# Patient Record
Sex: FEMALE | Race: BLACK OR AFRICAN AMERICAN | NOT HISPANIC OR LATINO | Employment: FULL TIME | URBAN - METROPOLITAN AREA
[De-identification: names, ages, dates, MRNs, and addresses within clinical notes are randomized per-mention and may not be internally consistent; named-entity substitution may affect disease eponyms.]

---

## 2021-01-26 LAB — CRC RECOMMENDATION EXT: NORMAL

## 2021-04-20 LAB
HPV RNA, HR E6/E7, TMA: NOT DETECTED
PAP RECOMMENDATION EXT: NORMAL

## 2022-07-20 LAB — BCS RECOMMENDATION EXT: NORMAL

## 2022-10-18 ENCOUNTER — OFFICE VISIT (OUTPATIENT)
Dept: PRIMARY CARE CLINIC | Facility: CLINIC | Age: 52
End: 2022-10-18
Payer: COMMERCIAL

## 2022-10-18 VITALS
DIASTOLIC BLOOD PRESSURE: 88 MMHG | HEART RATE: 84 BPM | RESPIRATION RATE: 18 BRPM | SYSTOLIC BLOOD PRESSURE: 122 MMHG | BODY MASS INDEX: 36.79 KG/M2 | TEMPERATURE: 99 F | WEIGHT: 242.75 LBS | HEIGHT: 68 IN | OXYGEN SATURATION: 100 %

## 2022-10-18 DIAGNOSIS — Z00.00 PREVENTATIVE HEALTH CARE: ICD-10-CM

## 2022-10-18 DIAGNOSIS — G89.29 CHRONIC BILATERAL LOW BACK PAIN WITHOUT SCIATICA: ICD-10-CM

## 2022-10-18 DIAGNOSIS — N94.6 DYSMENORRHEA: ICD-10-CM

## 2022-10-18 DIAGNOSIS — D21.9 FIBROIDS: ICD-10-CM

## 2022-10-18 DIAGNOSIS — M54.50 CHRONIC BILATERAL LOW BACK PAIN WITHOUT SCIATICA: ICD-10-CM

## 2022-10-18 DIAGNOSIS — E55.9 VITAMIN D DEFICIENCY: ICD-10-CM

## 2022-10-18 DIAGNOSIS — J32.9 CHRONIC RHINOSINUSITIS: Primary | ICD-10-CM

## 2022-10-18 DIAGNOSIS — J01.90 ACUTE BACTERIAL RHINOSINUSITIS: ICD-10-CM

## 2022-10-18 DIAGNOSIS — J34.2 NASAL SEPTAL DEVIATION: ICD-10-CM

## 2022-10-18 DIAGNOSIS — K11.8 MASS OF LEFT PAROTID GLAND: ICD-10-CM

## 2022-10-18 DIAGNOSIS — B96.89 ACUTE BACTERIAL RHINOSINUSITIS: ICD-10-CM

## 2022-10-18 DIAGNOSIS — B35.1 ONYCHOMYCOSIS OF GREAT TOE: ICD-10-CM

## 2022-10-18 PROCEDURE — 99999 PR PBB SHADOW E&M-NEW PATIENT-LVL V: ICD-10-PCS | Mod: PBBFAC,,, | Performed by: INTERNAL MEDICINE

## 2022-10-18 PROCEDURE — 3079F DIAST BP 80-89 MM HG: CPT | Mod: CPTII,S$GLB,, | Performed by: INTERNAL MEDICINE

## 2022-10-18 PROCEDURE — 99204 PR OFFICE/OUTPT VISIT, NEW, LEVL IV, 45-59 MIN: ICD-10-PCS | Mod: S$GLB,,, | Performed by: INTERNAL MEDICINE

## 2022-10-18 PROCEDURE — 1159F PR MEDICATION LIST DOCUMENTED IN MEDICAL RECORD: ICD-10-PCS | Mod: CPTII,S$GLB,, | Performed by: INTERNAL MEDICINE

## 2022-10-18 PROCEDURE — 3074F PR MOST RECENT SYSTOLIC BLOOD PRESSURE < 130 MM HG: ICD-10-PCS | Mod: CPTII,S$GLB,, | Performed by: INTERNAL MEDICINE

## 2022-10-18 PROCEDURE — 1159F MED LIST DOCD IN RCRD: CPT | Mod: CPTII,S$GLB,, | Performed by: INTERNAL MEDICINE

## 2022-10-18 PROCEDURE — 4010F ACE/ARB THERAPY RXD/TAKEN: CPT | Mod: CPTII,S$GLB,, | Performed by: INTERNAL MEDICINE

## 2022-10-18 PROCEDURE — 4010F PR ACE/ARB THEARPY RXD/TAKEN: ICD-10-PCS | Mod: CPTII,S$GLB,, | Performed by: INTERNAL MEDICINE

## 2022-10-18 PROCEDURE — 99999 PR PBB SHADOW E&M-NEW PATIENT-LVL V: CPT | Mod: PBBFAC,,, | Performed by: INTERNAL MEDICINE

## 2022-10-18 PROCEDURE — 99204 OFFICE O/P NEW MOD 45 MIN: CPT | Mod: S$GLB,,, | Performed by: INTERNAL MEDICINE

## 2022-10-18 PROCEDURE — 3074F SYST BP LT 130 MM HG: CPT | Mod: CPTII,S$GLB,, | Performed by: INTERNAL MEDICINE

## 2022-10-18 PROCEDURE — 3079F PR MOST RECENT DIASTOLIC BLOOD PRESSURE 80-89 MM HG: ICD-10-PCS | Mod: CPTII,S$GLB,, | Performed by: INTERNAL MEDICINE

## 2022-10-18 RX ORDER — MONTELUKAST SODIUM 10 MG/1
10 TABLET ORAL NIGHTLY
Qty: 30 TABLET | Refills: 0 | Status: SHIPPED | OUTPATIENT
Start: 2022-10-18 | End: 2022-11-14

## 2022-10-18 RX ORDER — LORATADINE 10 MG/1
TABLET ORAL
COMMUNITY
Start: 2022-10-01 | End: 2022-10-18 | Stop reason: SDUPTHER

## 2022-10-18 RX ORDER — ERGOCALCIFEROL 1.25 MG/1
50000 CAPSULE ORAL
Qty: 12 CAPSULE | Refills: 3 | Status: SHIPPED | OUTPATIENT
Start: 2022-10-18 | End: 2023-11-06 | Stop reason: SDUPTHER

## 2022-10-18 RX ORDER — NAPROXEN 500 MG/1
500 TABLET ORAL 2 TIMES DAILY WITH MEALS
Qty: 60 TABLET | Refills: 5 | Status: SHIPPED | OUTPATIENT
Start: 2022-10-18 | End: 2023-11-06

## 2022-10-18 RX ORDER — CYCLOBENZAPRINE HCL 10 MG
TABLET ORAL
COMMUNITY
Start: 2022-10-01 | End: 2022-10-18 | Stop reason: SDUPTHER

## 2022-10-18 RX ORDER — PANTOPRAZOLE SODIUM 20 MG/1
20 TABLET, DELAYED RELEASE ORAL DAILY
COMMUNITY
End: 2022-10-18 | Stop reason: SDUPTHER

## 2022-10-18 RX ORDER — NAPROXEN 500 MG/1
TABLET ORAL
COMMUNITY
Start: 2022-08-10 | End: 2022-10-18 | Stop reason: SDUPTHER

## 2022-10-18 RX ORDER — LORATADINE 10 MG/1
10 TABLET ORAL DAILY
Qty: 90 TABLET | Refills: 3 | Status: SHIPPED | OUTPATIENT
Start: 2022-10-18 | End: 2023-11-29

## 2022-10-18 RX ORDER — ERGOCALCIFEROL 1.25 MG/1
50000 CAPSULE ORAL
COMMUNITY
Start: 2022-09-29 | End: 2022-10-18 | Stop reason: SDUPTHER

## 2022-10-18 RX ORDER — PANTOPRAZOLE SODIUM 40 MG/10ML
INJECTION, POWDER, LYOPHILIZED, FOR SOLUTION INTRAVENOUS
COMMUNITY
Start: 2022-10-01 | End: 2022-10-18

## 2022-10-18 RX ORDER — CYCLOBENZAPRINE HCL 10 MG
5 TABLET ORAL NIGHTLY
Qty: 90 TABLET | Refills: 3 | Status: SHIPPED | OUTPATIENT
Start: 2022-10-18

## 2022-10-18 RX ORDER — PANTOPRAZOLE SODIUM 20 MG/1
20 TABLET, DELAYED RELEASE ORAL
Qty: 90 TABLET | Refills: 3 | Status: SHIPPED | OUTPATIENT
Start: 2022-10-18 | End: 2023-06-03

## 2022-10-18 RX ORDER — AZITHROMYCIN 250 MG/1
TABLET, FILM COATED ORAL
Qty: 6 TABLET | Refills: 0 | Status: SHIPPED | OUTPATIENT
Start: 2022-10-18 | End: 2022-10-23

## 2022-10-18 NOTE — PATIENT INSTRUCTIONS
Get COVID vaccine  -Check Bps at home weekly and prn symptoms for goal BP <130/80.  -cont healthy diet high in fiber, low fat dairy, lean protein, low in saturated/trans fat; high in Ca/Mag/K, 1.5 gm  Na diet; low in processed sugars and foods, ie no white sugars, bread, pasta, rice.   -Moderate exercise 30 min 5 times per week or 75 min intense exercise  -reduce alcohol to 1-2 glasses per day  - no NSAIDs

## 2022-10-18 NOTE — ASSESSMENT & PLAN NOTE
-refer to podiatry, Dr Urbano Matos, for eval and tx  -stop using OTC Kerasal which is not effective for tx'g fungal infection

## 2022-10-18 NOTE — ASSESSMENT & PLAN NOTE
Refill Naproxen to be taken with PPI; refill flexeril 5mg po prn severe back/pelvic pain associated with menstruation

## 2022-10-18 NOTE — ASSESSMENT & PLAN NOTE
-start Singulair 10mg po daily; cont Claritin, nasal saline then flonase bid  -refer to ENT, Dr. Mary, for eval and tx

## 2022-10-18 NOTE — PROGRESS NOTES
[facility] Note      Chief Complaint     Annual Exam (ES), Establish Care, and Sinus Problem (Going on for 2 weeks)   for annual exam and acute sinus infeciton    Subjective:      History of Present Illness:  Serena Yang is a 52 y.o. female     C/o 2 week hx of progressive sinus congestion and post-nasal drip, yellow nasal discharge, halitosis, right upper teeth pain, right facial swelling with occasional chills without fever. Pt recently saw a dentist who stated pt did not have dental issues. She uses saline then flonase.     Dysmenorrhea with Enlarged leiomyomatous uterus with multiple fibroids by transvag US 10/2021: still with heavy bleeding x 4 days with clots; pt c/o severe back pain requiring flexeril, naproxen and PPI - requests refill; pt sees Dr. Kendall and will consider FIDE since conservative measures have not been effective.     Chronic bilateral ethmoid and maxillary sinusitis and nasal septal deviation to the right; mucus retention cyst in right sphenoid sinus based on CT of face with contrast done 10/2021: on saline and flonase; now with acute episode x 2 weeks;  also takes Claritin 10mg daily since Zyrtec was too sedatin    Posterior mass 1 cm 1 cm 1.2 cm of left parotid gland incidentally found on CT of head on 10/2021: recommended CT of neck with and without contrast but has not yet been ordered    Sclerotic right big toenail and right 5th toenail: failed OTC Kerasal and requests referral to podiatry     /78: yesterday    Obesity: gained approx 10lbs since working from 8am - 5pm at home; ordered a treadmill.     Abnormal MMG requiring diagnostic right MMG 7/2022: repeat in 1/2023 (Within the upper inner quadrant of the posterior right breast approximately 10 cm from the nipple is a tight group of round/punctate microcalcifications that have a benign appearance. Short-term interval follow-up in 6 months is recommended with a repeat mammogram to ensure  stability)    Vit D def'y: cont to take Vit D 50,000 units weekly    SH: lives in [facility] with samantha; has 34 y/o son who lives in [place]    Past Medical History:  No past medical history on file.    Past Surgical History:  No past surgical history on file.    Allergies:  Review of patient's allergies indicates:  No Known Allergies    Home Medications:  Current Outpatient Medications   Medication Sig Dispense Refill    fluticasone propionate (FLONASE NASL)       azithromycin (Z-MARLO) 250 MG tablet Take 2 tablets by mouth on day 1; Take 1 tablet by mouth on days 2-5 6 tablet 0    cyclobenzaprine (FLEXERIL) 10 MG tablet Take 0.5 tablets (5 mg total) by mouth every evening. 90 tablet 3    ergocalciferol (ERGOCALCIFEROL) 50,000 unit Cap Take 1 capsule (50,000 Units total) by mouth every 7 days. 12 capsule 3    loratadine (CLARITIN) 10 mg tablet Take 1 tablet (10 mg total) by mouth once daily. 90 tablet 3    montelukast (SINGULAIR) 10 mg tablet Take 1 tablet (10 mg total) by mouth every evening. 30 tablet 0    naproxen (NAPROSYN) 500 MG tablet Take 1 tablet (500 mg total) by mouth 2 (two) times daily with meals. 60 tablet 5    pantoprazole (PROTONIX) 20 MG tablet Take 1 tablet (20 mg total) by mouth 2 (two) times daily before meals. 90 tablet 3     No current facility-administered medications for this visit.        Family History:  No family history on file.    Social History:  Social History     Tobacco Use    Smoking status: Never    Smokeless tobacco: Never       Review of Systems:  Pertinent positives and negatives listed in HPI. All other systems are reviewed and are negative.    Health Maintaince :   Health Maintenance Topics with due status: Not Due       Topic Last Completion Date    Mammogram 07/20/2022           Eye: UTD  Dental: has dental appt on 10/19/2022  Immunizations:   Tdap: not today.  Influenza: does not want.  Pneumovax: N/A  Shingrex x 2: No  COVID: needs bivalent booster  Hepatitis C:   Cancer  "Screening:  PAP: UTD with Dr. Kendall  Mammogram: UTD July 2022 with right diagnostic tomosynthesis MMG benign punctate calcifications - repeat in 6 mo 1/2023 at [facility]  Colonoscopy: 2022 WNL repeat in 2032 at  with Dr. Colbert  DEXA:  N/a    The ASCVD Risk score (Joe AGUILAR, et al., 2019) failed to calculate for the following reasons:    Cannot find a previous HDL lab    Cannot find a previous total cholesterol lab      Objective:   /88   Pulse 84   Temp 98.9 °F (37.2 °C) (Oral)   Resp 18   Ht 5' 8" (1.727 m)   Wt 110.1 kg (242 lb 11.6 oz)   LMP 10/13/2022   SpO2 100%   BMI 36.91 kg/m²      Body mass index is 36.91 kg/m².       Physical Examination:  General: Alert and awake in no apparent distress  HEENT: Normocephalic and atraumatic; Tms WNL; left nasal congestion; bilateral pale swollen inferior turbinates; tender maxillary sinuses right >left; mild cheek swelling  Eyes:  PERRL; EOMi with anicteric sclera and clear conjunctivae  Mouth:  Oropharynx clear and without exudate; moist mucous membranes  Neck:   Cervical nodes not enlarged; supple; no bruits  Cardio:  Regular rate and rhythm with normal S1 and S2; no murmurs or rubs  Resp:  CTAB; respirations unlabored; no wheezes, crackles or rhonchi  Abdom: Soft, NTND with normoactive bowel sounds; negative HSM  Extrem: Warm and well-perfused with no clubbing, cyanosis or edema;brown thickened sclerotic right big toenail and right 5th toenail; whitish chalk discoloration of left big toenail   Skin:  No rashes, lesions, or color changes  Pulses:  2+ and symmetric distally  Neuro:  AAOx3; cooperative and pleasant with no focal deficits    Laboratory:      Most Recent Data:  No results found for: WBC, HGB, HCT, PLT, CHOL, TRIG, HDL, LDLDIRECT, ALT, AST, NA, K, CL, BUN, CO2, TSH, PSA, INR, GLUF, HGBA1C, MICROALBUR           CBC: No results found for: WBC, HGB, HCT, PLT, MCV, RDW  BMP: No results found for: NA, K, CL, CO2, BUN, CREATININE, GLU, CALCIUM, MG, " PHOS  LFTs: No results found for: PROT, ALBUMIN, BILITOT, AST, ALKPHOS, ALT, GGT  Coags: No results found for: INR, PROTIME, PTT  FLP:    No results found for: CHOL  No results found for: HDL  No results found for: LDLCALC  No results found for: TRIG  No results found for: CHOLHDL   DM:    No results found for: HGBA1C, GLUF, MICROALBUR, LDLCALC, CREATININE  Thyroid: No results found for: TSH, FREET4, H7HUBEL, B3OLJHB, THYROIDAB  Anemia: No results found for: IRON, TIBC, FERRITIN, [id number], FOLATE  Cardiac: No results found for: TROPONINI, CKTOTAL, CKMB, BNP  Urinalysis: No results found for: LABURIN, COLORU, PHUA, CLARITYU, SPECGRAV, LABSPEC, NITRITE, PROTEINUR, GLUCOSEU, KETONESU, UROBILINOGEN, BILIRUBINUR, BLOODU, RBCU, WBCUA    Other Laboratory Data:      Other Results:  EKG (my interpretation):     Radiology:  No image results found.          Assessment/Plan     Serena Yang is a 52 y.o. female with:  1. Chronic rhinosinusitis  Assessment & Plan:  -start Singulair 10mg po daily; cont Claritin, nasal saline then flonase bid  -refer to ENT, Dr. Mary, for eval and tx      Orders:  -     montelukast (SINGULAIR) 10 mg tablet; Take 1 tablet (10 mg total) by mouth every evening.  Dispense: 30 tablet; Refill: 0  -     loratadine (CLARITIN) 10 mg tablet; Take 1 tablet (10 mg total) by mouth once daily.  Dispense: 90 tablet; Refill: 3  -     Ambulatory referral/consult to ENT; Future; Expected date: 10/25/2022    2. Mass of left parotid gland  -     CT Soft Tissue Neck With Contrast; Future; Expected date: 10/18/2022  -     Cancel: Ambulatory referral/consult to Endocrine/ENT Surgery for ENT/Head and Neck Surgeons; Future; Expected date: 10/25/2022    3. Acute bacterial rhinosinusitis  Assessment & Plan:  -Zpack prescribed today  -rinse sinuses with Ocean nasal spray then use flonase  -start Singulair 10mg po qhs      Orders:  -     azithromycin (Z-MARLO) 250 MG tablet; Take 2 tablets by mouth on day 1; Take 1  tablet by mouth on days 2-5  Dispense: 6 tablet; Refill: 0    4. Nasal septal deviation  Assessment & Plan:  -refer to ENT for eval and tx     Orders:  -     Ambulatory referral/consult to ENT; Future; Expected date: 10/25/2022    5. Preventative health care  Assessment & Plan:  Pt refuses flu and Shingrex  -get bivalent COVID vaccine  -check routine labs     Orders:  -     Lipid Panel; Future; Expected date: 10/18/2022  -     Hemoglobin A1C; Future; Expected date: 10/18/2022  -     Urinalysis; Future; Expected date: 10/18/2022  -     Comprehensive Metabolic Panel; Future; Expected date: 10/18/2022  -     CBC Auto Differential; Future; Expected date: 10/18/2022  -     Hepatitis C Antibody; Future; Expected date: 10/18/2022    6. Chronic bilateral low back pain without sciatica    7. Vitamin D deficiency  Assessment & Plan:  Check Vit D level now    Orders:  -     ergocalciferol (ERGOCALCIFEROL) 50,000 unit Cap; Take 1 capsule (50,000 Units total) by mouth every 7 days.  Dispense: 12 capsule; Refill: 3  -     Vitamin D; Future; Expected date: 10/18/2022    8. Onychomycosis of great toe  Assessment & Plan:  -refer to podiatry, Dr Urbano Matos, for eval and tx  -stop using OTC Kerasal which is not effective for tx'g fungal infection    Orders:  -     Ambulatory referral/consult to Podiatry; Future; Expected date: 10/25/2022    9. Dysmenorrhea  Assessment & Plan:  Refill Naproxen to be taken with PPI; refill flexeril 5mg po prn severe back/pelvic pain associated with menstruation    Orders:  -     cyclobenzaprine (FLEXERIL) 10 MG tablet; Take 0.5 tablets (5 mg total) by mouth every evening.  Dispense: 90 tablet; Refill: 3  -     naproxen (NAPROSYN) 500 MG tablet; Take 1 tablet (500 mg total) by mouth 2 (two) times daily with meals.  Dispense: 60 tablet; Refill: 5  -     pantoprazole (PROTONIX) 20 MG tablet; Take 1 tablet (20 mg total) by mouth 2 (two) times daily before meals.  Dispense: 90 tablet; Refill: 3    10.  Fibroids  Assessment & Plan:  Pt considering surgery ie myomectomy vs hysterectomy   -check CBC now             I spent a total of 45 minutes on the day of the visit.This includes face to face time and non-face to face time preparing to see the patient (eg, review of tests), obtaining and/or reviewing separately obtained history, documenting clinical information in the electronic or other health record, independently interpreting results and communicating results to the patient/family/caregiver, or care coordinator.   Code Status:     Janelle Zamora MD

## 2022-10-18 NOTE — ASSESSMENT & PLAN NOTE
-Zpack prescribed today  -rinse sinuses with Ocean nasal spray then use flonase  -start Singulair 10mg po qhs

## 2022-10-27 ENCOUNTER — LAB VISIT (OUTPATIENT)
Dept: LAB | Facility: HOSPITAL | Age: 52
End: 2022-10-27
Attending: INTERNAL MEDICINE
Payer: COMMERCIAL

## 2022-10-27 DIAGNOSIS — E55.9 VITAMIN D DEFICIENCY: ICD-10-CM

## 2022-10-27 DIAGNOSIS — Z00.00 PREVENTATIVE HEALTH CARE: ICD-10-CM

## 2022-10-27 LAB
25(OH)D3+25(OH)D2 SERPL-MCNC: 19 NG/ML (ref 30–96)
ALBUMIN SERPL BCP-MCNC: 3.7 G/DL (ref 3.5–5.2)
ALP SERPL-CCNC: 61 U/L (ref 55–135)
ALT SERPL W/O P-5'-P-CCNC: 14 U/L (ref 10–44)
ANION GAP SERPL CALC-SCNC: 11 MMOL/L (ref 8–16)
AST SERPL-CCNC: 14 U/L (ref 10–40)
BASOPHILS # BLD AUTO: 0.01 K/UL (ref 0–0.2)
BASOPHILS NFR BLD: 0.3 % (ref 0–1.9)
BILIRUB SERPL-MCNC: 0.6 MG/DL (ref 0.1–1)
BUN SERPL-MCNC: 9 MG/DL (ref 6–20)
CALCIUM SERPL-MCNC: 9 MG/DL (ref 8.7–10.5)
CHLORIDE SERPL-SCNC: 101 MMOL/L (ref 95–110)
CHOLEST SERPL-MCNC: 192 MG/DL (ref 120–199)
CHOLEST/HDLC SERPL: 3.2 {RATIO} (ref 2–5)
CO2 SERPL-SCNC: 23 MMOL/L (ref 23–29)
CREAT SERPL-MCNC: 0.7 MG/DL (ref 0.5–1.4)
DIFFERENTIAL METHOD: ABNORMAL
EOSINOPHIL # BLD AUTO: 0.1 K/UL (ref 0–0.5)
EOSINOPHIL NFR BLD: 1.7 % (ref 0–8)
ERYTHROCYTE [DISTWIDTH] IN BLOOD BY AUTOMATED COUNT: 13.7 % (ref 11.5–14.5)
EST. GFR  (NO RACE VARIABLE): >60 ML/MIN/1.73 M^2
ESTIMATED AVG GLUCOSE: 117 MG/DL (ref 68–131)
GLUCOSE SERPL-MCNC: 81 MG/DL (ref 70–110)
HBA1C MFR BLD: 5.7 % (ref 4–5.6)
HCT VFR BLD AUTO: 39.8 % (ref 37–48.5)
HCV AB SERPL QL IA: NORMAL
HDLC SERPL-MCNC: 60 MG/DL (ref 40–75)
HDLC SERPL: 31.3 % (ref 20–50)
HGB BLD-MCNC: 12.5 G/DL (ref 12–16)
IMM GRANULOCYTES # BLD AUTO: 0 K/UL (ref 0–0.04)
IMM GRANULOCYTES NFR BLD AUTO: 0 % (ref 0–0.5)
LDLC SERPL CALC-MCNC: 119 MG/DL (ref 63–159)
LYMPHOCYTES # BLD AUTO: 2.2 K/UL (ref 1–4.8)
LYMPHOCYTES NFR BLD: 61.1 % (ref 18–48)
MCH RBC QN AUTO: 27.6 PG (ref 27–31)
MCHC RBC AUTO-ENTMCNC: 31.4 G/DL (ref 32–36)
MCV RBC AUTO: 88 FL (ref 82–98)
MONOCYTES # BLD AUTO: 0.3 K/UL (ref 0.3–1)
MONOCYTES NFR BLD: 9.2 % (ref 4–15)
NEUTROPHILS # BLD AUTO: 1 K/UL (ref 1.8–7.7)
NEUTROPHILS NFR BLD: 27.7 % (ref 38–73)
NONHDLC SERPL-MCNC: 132 MG/DL
NRBC BLD-RTO: 0 /100 WBC
PLATELET # BLD AUTO: 310 K/UL (ref 150–450)
PMV BLD AUTO: 11.2 FL (ref 9.2–12.9)
POTASSIUM SERPL-SCNC: 3.8 MMOL/L (ref 3.5–5.1)
PROT SERPL-MCNC: 8.4 G/DL (ref 6–8.4)
RBC # BLD AUTO: 4.53 M/UL (ref 4–5.4)
SODIUM SERPL-SCNC: 135 MMOL/L (ref 136–145)
TRIGL SERPL-MCNC: 65 MG/DL (ref 30–150)
WBC # BLD AUTO: 3.6 K/UL (ref 3.9–12.7)

## 2022-10-27 PROCEDURE — 80061 LIPID PANEL: CPT | Performed by: INTERNAL MEDICINE

## 2022-10-27 PROCEDURE — 83036 HEMOGLOBIN GLYCOSYLATED A1C: CPT | Performed by: INTERNAL MEDICINE

## 2022-10-27 PROCEDURE — 82306 VITAMIN D 25 HYDROXY: CPT | Performed by: INTERNAL MEDICINE

## 2022-10-27 PROCEDURE — 36415 COLL VENOUS BLD VENIPUNCTURE: CPT | Mod: PN | Performed by: INTERNAL MEDICINE

## 2022-10-27 PROCEDURE — 80053 COMPREHEN METABOLIC PANEL: CPT | Performed by: INTERNAL MEDICINE

## 2022-10-27 PROCEDURE — 85025 COMPLETE CBC W/AUTO DIFF WBC: CPT | Performed by: INTERNAL MEDICINE

## 2022-10-27 PROCEDURE — 86803 HEPATITIS C AB TEST: CPT | Performed by: INTERNAL MEDICINE

## 2022-10-28 ENCOUNTER — PATIENT MESSAGE (OUTPATIENT)
Dept: PRIMARY CARE CLINIC | Facility: CLINIC | Age: 52
End: 2022-10-28
Payer: COMMERCIAL

## 2022-10-28 DIAGNOSIS — J32.9 CHRONIC RHINOSINUSITIS: Primary | ICD-10-CM

## 2022-11-01 ENCOUNTER — OFFICE VISIT (OUTPATIENT)
Dept: PODIATRY | Facility: CLINIC | Age: 52
End: 2022-11-01
Payer: COMMERCIAL

## 2022-11-01 ENCOUNTER — HOSPITAL ENCOUNTER (OUTPATIENT)
Dept: RADIOLOGY | Facility: OTHER | Age: 52
Discharge: HOME | End: 2022-11-01
Attending: INTERNAL MEDICINE
Payer: COMMERCIAL

## 2022-11-01 VITALS
DIASTOLIC BLOOD PRESSURE: 85 MMHG | BODY MASS INDEX: 36.68 KG/M2 | SYSTOLIC BLOOD PRESSURE: 142 MMHG | HEART RATE: 98 BPM | WEIGHT: 242 LBS | HEIGHT: 68 IN

## 2022-11-01 DIAGNOSIS — B35.1 ONYCHOMYCOSIS OF GREAT TOE: ICD-10-CM

## 2022-11-01 DIAGNOSIS — B35.1 ONYCHOMYCOSIS DUE TO DERMATOPHYTE: Primary | ICD-10-CM

## 2022-11-01 DIAGNOSIS — K11.8 MASS OF LEFT PAROTID GLAND: ICD-10-CM

## 2022-11-01 PROCEDURE — 70491 CT SOFT TISSUE NECK WITH CONTRAST: ICD-10-PCS | Mod: 26,,, | Performed by: RADIOLOGY

## 2022-11-01 PROCEDURE — 99999 PR PBB SHADOW E&M-EST. PATIENT-LVL III: ICD-10-PCS | Mod: PBBFAC,,, | Performed by: PODIATRIST

## 2022-11-01 PROCEDURE — 99202 OFFICE O/P NEW SF 15 MIN: CPT | Mod: S$GLB,,, | Performed by: PODIATRIST

## 2022-11-01 PROCEDURE — 99999 PR PBB SHADOW E&M-EST. PATIENT-LVL III: CPT | Mod: PBBFAC,,, | Performed by: PODIATRIST

## 2022-11-01 PROCEDURE — 70491 CT SOFT TISSUE NECK W/DYE: CPT | Mod: 26,,, | Performed by: RADIOLOGY

## 2022-11-01 PROCEDURE — 99202 PR OFFICE/OUTPT VISIT, NEW, LEVL II, 15-29 MIN: ICD-10-PCS | Mod: S$GLB,,, | Performed by: PODIATRIST

## 2022-11-01 PROCEDURE — 25500020 PHARM REV CODE 255: Performed by: INTERNAL MEDICINE

## 2022-11-01 PROCEDURE — 70491 CT SOFT TISSUE NECK W/DYE: CPT | Mod: TC

## 2022-11-01 RX ORDER — CICLOPIROX 80 MG/ML
SOLUTION TOPICAL NIGHTLY
Qty: 6.6 ML | Refills: 11 | Status: SHIPPED | OUTPATIENT
Start: 2022-11-01

## 2022-11-01 RX ADMIN — IOHEXOL 75 ML: 350 INJECTION, SOLUTION INTRAVENOUS at 02:11

## 2022-11-01 NOTE — PROGRESS NOTES
Subjective:      Patient ID: Serena Yang is a 52 y.o. female.    Chief Complaint: Nail Problem (Onychomycosis of R great toe)    Thick dark discolored misshapen toenails right 1st and 5th toes.  Gradual onset, worsening over past several weeks, aggravated by increased weight bearing, shoe gear, pressure.  No previous medical treatment.  OTC  med not helping.     Review of Systems   Constitutional: Negative for chills, decreased appetite, diaphoresis, fever and malaise/fatigue.   Skin:  Positive for nail changes.         Objective:      Physical Exam  Constitutional:       General: She is not in acute distress.     Appearance: She is well-developed. She is not diaphoretic.   Cardiovascular:      Pulses:           Popliteal pulses are 2+ on the right side and 2+ on the left side.        Dorsalis pedis pulses are 2+ on the right side and 2+ on the left side.        Posterior tibial pulses are 2+ on the right side and 2+ on the left side.      Comments: Capillary refill 3 seconds all toes/distal feet, all toes/both feet warm to touch.      Negative lymphadenopathy bilateral popliteal fossa and tarsal tunnel.      Negavie lower extremity edema bilateral.    Musculoskeletal:      Right ankle: No swelling, deformity, ecchymosis or lacerations. Normal range of motion. Normal pulse.      Right Achilles Tendon: Normal. No defects. Sanon's test negative.   Lymphadenopathy:      Lower Body: No right inguinal adenopathy. No left inguinal adenopathy.      Comments: Negative lymphadenopathy bilateral popliteal fossa and tarsal tunnel.    Negative lymphangitic streaking bilateral feet/ankles/legs.   Skin:     General: Skin is warm and dry.      Capillary Refill: Capillary refill takes 2 to 3 seconds.      Coloration: Skin is not pale.      Findings: No abrasion, bruising, burn, ecchymosis, erythema, laceration, lesion or rash.      Nails: There is no clubbing.      Comments: Toenails 1st, 5th  right are hypertrophic,  dystrophic, discolored tanish brown with tan, gray crumbly subungual debris.  Tender to distal nail plate pressure, without periungual skin abnormality of each.       Neurological:      Mental Status: She is alert and oriented to person, place, and time.      Sensory: No sensory deficit.      Motor: No tremor, atrophy or abnormal muscle tone.      Gait: Gait normal.      Deep Tendon Reflexes:      Reflex Scores:       Patellar reflexes are 2+ on the right side and 2+ on the left side.       Achilles reflexes are 2+ on the right side and 2+ on the left side.  Psychiatric:         Behavior: Behavior is cooperative.           Assessment:       Encounter Diagnoses   Name Primary?    Onychomycosis of great toe     Onychomycosis due to dermatophyte Yes         Plan:       Serena was seen today for nail problem.    Diagnoses and all orders for this visit:    Onychomycosis due to dermatophyte    Onychomycosis of great toe  -     Ambulatory referral/consult to Podiatry    Other orders  -     ciclopirox (PENLAC) 8 % Soln; Apply topically nightly.      I counseled the patient on her conditions, their implications and medical management.        Penlac/dry well after bathing/Discussed conservative treatment with shoes of adequate dimensions, material, and style to alleviate symptoms and delay or prevent surgical intervention.           No follow-ups on file.

## 2022-11-02 ENCOUNTER — PATIENT MESSAGE (OUTPATIENT)
Dept: PRIMARY CARE CLINIC | Facility: CLINIC | Age: 52
End: 2022-11-02
Payer: COMMERCIAL

## 2022-11-02 DIAGNOSIS — E04.1 RIGHT THYROID NODULE: Primary | ICD-10-CM

## 2022-11-05 RX ORDER — FLUTICASONE PROPIONATE 50 MCG
2 SPRAY, SUSPENSION (ML) NASAL DAILY
Qty: 11.1 ML | Refills: 3 | Status: SHIPPED | OUTPATIENT
Start: 2022-11-05 | End: 2023-04-29

## 2022-11-07 ENCOUNTER — PATIENT MESSAGE (OUTPATIENT)
Dept: ADMINISTRATIVE | Facility: HOSPITAL | Age: 52
End: 2022-11-07
Payer: COMMERCIAL

## 2022-11-11 ENCOUNTER — HOSPITAL ENCOUNTER (OUTPATIENT)
Dept: RADIOLOGY | Facility: OTHER | Age: 52
Discharge: HOME | End: 2022-11-11
Attending: INTERNAL MEDICINE
Payer: COMMERCIAL

## 2022-11-11 ENCOUNTER — TELEPHONE (OUTPATIENT)
Dept: PRIMARY CARE CLINIC | Facility: CLINIC | Age: 52
End: 2022-11-11
Payer: COMMERCIAL

## 2022-11-11 DIAGNOSIS — E04.1 RIGHT THYROID NODULE: ICD-10-CM

## 2022-11-11 PROCEDURE — 76536 US EXAM OF HEAD AND NECK: CPT | Mod: TC

## 2022-11-11 PROCEDURE — 76536 US SOFT TISSUE HEAD NECK THYROID: ICD-10-PCS | Mod: 26,,, | Performed by: RADIOLOGY

## 2022-11-11 PROCEDURE — 76536 US EXAM OF HEAD AND NECK: CPT | Mod: 26,,, | Performed by: RADIOLOGY

## 2022-11-13 DIAGNOSIS — E04.1 RIGHT THYROID NODULE: Primary | ICD-10-CM

## 2022-11-13 DIAGNOSIS — Z12.11 SCREENING FOR COLON CANCER: ICD-10-CM

## 2022-11-18 ENCOUNTER — PATIENT MESSAGE (OUTPATIENT)
Dept: PRIMARY CARE CLINIC | Facility: CLINIC | Age: 52
End: 2022-11-18
Payer: COMMERCIAL

## 2022-11-22 ENCOUNTER — TELEPHONE (OUTPATIENT)
Dept: PRIMARY CARE CLINIC | Facility: CLINIC | Age: 52
End: 2022-11-22
Payer: COMMERCIAL

## 2022-11-22 ENCOUNTER — PATIENT MESSAGE (OUTPATIENT)
Dept: PRIMARY CARE CLINIC | Facility: CLINIC | Age: 52
End: 2022-11-22
Payer: COMMERCIAL

## 2022-12-05 ENCOUNTER — PATIENT OUTREACH (OUTPATIENT)
Dept: ADMINISTRATIVE | Facility: HOSPITAL | Age: 52
End: 2022-12-05
Payer: COMMERCIAL

## 2022-12-05 NOTE — LETTER
AUTHORIZATION FOR RELEASE OF   CONFIDENTIAL INFORMATION    Dear Medical Records,    We are seeing Serena Yang, date of birth 1970, in the clinic at [facility] PRIMARY [facility]. Janelle Zamora MD is the patient's PCP. Serena Yang has an outstanding lab/procedure at the time we reviewed her chart. In order to help keep her health information updated, she has authorized us to request the following medical record(s):        (  )  MAMMOGRAM                                      ( x )  COLONOSCOPY      (  )  PAP SMEAR                                          (  )  OUTSIDE LAB RESULTS     (  )  DEXA SCAN                                          (  )  EYE EXAM            (  )  FOOT EXAM                                          (  )  ENTIRE RECORD     (  )  OUTSIDE IMMUNIZATIONS                 (  )  _______________         Please fax records to [facility], Betty Peyti Lo, MD, [phone]     If you have any questions, please contact PATRIZIA Lowe at [phone].           Patient Name: Serena Yang  : 1970  Patient Phone #: [phone]

## 2022-12-12 ENCOUNTER — PATIENT OUTREACH (OUTPATIENT)
Dept: ADMINISTRATIVE | Facility: HOSPITAL | Age: 52
End: 2022-12-12
Payer: COMMERCIAL

## 2022-12-14 ENCOUNTER — PATIENT OUTREACH (OUTPATIENT)
Dept: ADMINISTRATIVE | Facility: HOSPITAL | Age: 52
End: 2022-12-14
Payer: COMMERCIAL

## 2023-01-03 ENCOUNTER — PATIENT MESSAGE (OUTPATIENT)
Dept: PRIMARY CARE CLINIC | Facility: CLINIC | Age: 53
End: 2023-01-03
Payer: COMMERCIAL

## 2023-01-03 DIAGNOSIS — Z12.39 ENCOUNTER FOR SCREENING FOR MALIGNANT NEOPLASM OF BREAST, UNSPECIFIED SCREENING MODALITY: Primary | ICD-10-CM

## 2023-01-23 PROBLEM — J01.90 ACUTE BACTERIAL RHINOSINUSITIS: Status: RESOLVED | Noted: 2022-10-18 | Resolved: 2023-01-23

## 2023-01-23 PROBLEM — Z00.00 PREVENTATIVE HEALTH CARE: Status: RESOLVED | Noted: 2022-10-18 | Resolved: 2023-01-23

## 2023-01-23 PROBLEM — B96.89 ACUTE BACTERIAL RHINOSINUSITIS: Status: RESOLVED | Noted: 2022-10-18 | Resolved: 2023-01-23

## 2023-04-13 ENCOUNTER — PATIENT MESSAGE (OUTPATIENT)
Dept: PRIMARY CARE CLINIC | Facility: CLINIC | Age: 53
End: 2023-04-13
Payer: COMMERCIAL

## 2023-04-14 DIAGNOSIS — E04.2 MULTIPLE THYROID NODULES: Primary | ICD-10-CM

## 2023-04-20 ENCOUNTER — HOSPITAL ENCOUNTER (OUTPATIENT)
Dept: RADIOLOGY | Facility: OTHER | Age: 53
Discharge: HOME | End: 2023-04-20
Attending: INTERNAL MEDICINE
Payer: COMMERCIAL

## 2023-04-20 DIAGNOSIS — E04.2 MULTIPLE THYROID NODULES: ICD-10-CM

## 2023-04-20 PROCEDURE — 76536 US EXAM OF HEAD AND NECK: CPT | Mod: TC

## 2023-04-20 PROCEDURE — 76536 US THYROID: ICD-10-PCS | Mod: 26,,, | Performed by: RADIOLOGY

## 2023-04-20 PROCEDURE — 76536 US EXAM OF HEAD AND NECK: CPT | Mod: 26,,, | Performed by: RADIOLOGY

## 2023-04-29 DIAGNOSIS — J32.9 CHRONIC RHINOSINUSITIS: ICD-10-CM

## 2023-04-29 RX ORDER — FLUTICASONE PROPIONATE 50 MCG
SPRAY, SUSPENSION (ML) NASAL
Qty: 48 G | Refills: 1 | Status: SHIPPED | OUTPATIENT
Start: 2023-04-29 | End: 2023-10-08

## 2023-04-29 NOTE — TELEPHONE ENCOUNTER
No care due was identified.  [facility] Embedded Care Due Messages. Reference number: [id number].   4/29/2023 4:16:41 AM CDT

## 2023-04-29 NOTE — TELEPHONE ENCOUNTER
Refill Decision Note   Jenniekeegan Trey  is requesting a refill authorization.  Brief Assessment and Rationale for Refill:  Approve     Medication Therapy Plan:  10/18/22 LOV    Medication Reconciliation Completed: No   Comments:     No Care Gaps recommended.     Note composed:6:13 PM 04/29/2023

## 2023-08-08 ENCOUNTER — LAB VISIT (OUTPATIENT)
Dept: LAB | Facility: HOSPITAL | Age: 53
End: 2023-08-08
Attending: INTERNAL MEDICINE
Payer: COMMERCIAL

## 2023-08-08 ENCOUNTER — OFFICE VISIT (OUTPATIENT)
Dept: PRIMARY CARE CLINIC | Facility: CLINIC | Age: 53
End: 2023-08-08
Payer: COMMERCIAL

## 2023-08-08 VITALS
WEIGHT: 242.5 LBS | HEIGHT: 68 IN | HEART RATE: 84 BPM | TEMPERATURE: 99 F | DIASTOLIC BLOOD PRESSURE: 82 MMHG | SYSTOLIC BLOOD PRESSURE: 130 MMHG | OXYGEN SATURATION: 98 % | RESPIRATION RATE: 18 BRPM | BODY MASS INDEX: 36.75 KG/M2

## 2023-08-08 DIAGNOSIS — R92.8 ABNORMAL MAMMOGRAM OF BOTH BREASTS: ICD-10-CM

## 2023-08-08 DIAGNOSIS — E89.0 H/O PARTIAL THYROIDECTOMY: ICD-10-CM

## 2023-08-08 DIAGNOSIS — E55.9 VITAMIN D DEFICIENCY: ICD-10-CM

## 2023-08-08 DIAGNOSIS — G61.9 INFLAMMATORY NEUROPATHY: Primary | ICD-10-CM

## 2023-08-08 DIAGNOSIS — R23.2 HOT FLASHES: ICD-10-CM

## 2023-08-08 DIAGNOSIS — G62.9 NEUROPATHY: ICD-10-CM

## 2023-08-08 DIAGNOSIS — K11.8 MASS OF LEFT PAROTID GLAND: ICD-10-CM

## 2023-08-08 DIAGNOSIS — J32.9 CHRONIC RHINOSINUSITIS: ICD-10-CM

## 2023-08-08 DIAGNOSIS — Z00.00 PREVENTATIVE HEALTH CARE: ICD-10-CM

## 2023-08-08 DIAGNOSIS — N94.6 DYSMENORRHEA: ICD-10-CM

## 2023-08-08 DIAGNOSIS — B35.1 ONYCHOMYCOSIS OF GREAT TOE: ICD-10-CM

## 2023-08-08 DIAGNOSIS — D21.9 FIBROIDS: ICD-10-CM

## 2023-08-08 PROBLEM — Z98.890: Status: ACTIVE | Noted: 2023-08-08

## 2023-08-08 PROBLEM — Z90.89: Status: ACTIVE | Noted: 2023-08-08

## 2023-08-08 PROBLEM — G89.29 CHRONIC BILATERAL LOW BACK PAIN WITHOUT SCIATICA: Status: RESOLVED | Noted: 2022-10-18 | Resolved: 2023-08-08

## 2023-08-08 PROBLEM — M54.50 CHRONIC BILATERAL LOW BACK PAIN WITHOUT SCIATICA: Status: RESOLVED | Noted: 2022-10-18 | Resolved: 2023-08-08

## 2023-08-08 LAB
25(OH)D3+25(OH)D2 SERPL-MCNC: 26 NG/ML (ref 30–96)
ALBUMIN SERPL BCP-MCNC: 3.8 G/DL (ref 3.5–5.2)
ALP SERPL-CCNC: 54 U/L (ref 55–135)
ALT SERPL W/O P-5'-P-CCNC: 13 U/L (ref 10–44)
ANION GAP SERPL CALC-SCNC: 13 MMOL/L (ref 8–16)
AST SERPL-CCNC: 14 U/L (ref 10–40)
BASOPHILS # BLD AUTO: 0.01 K/UL (ref 0–0.2)
BASOPHILS NFR BLD: 0.2 % (ref 0–1.9)
BILIRUB SERPL-MCNC: 0.5 MG/DL (ref 0.1–1)
BUN SERPL-MCNC: 10 MG/DL (ref 6–20)
CALCIUM SERPL-MCNC: 9.4 MG/DL (ref 8.7–10.5)
CHLORIDE SERPL-SCNC: 102 MMOL/L (ref 95–110)
CHOLEST SERPL-MCNC: 222 MG/DL (ref 120–199)
CHOLEST/HDLC SERPL: 3.8 {RATIO} (ref 2–5)
CO2 SERPL-SCNC: 20 MMOL/L (ref 23–29)
CREAT SERPL-MCNC: 0.7 MG/DL (ref 0.5–1.4)
DIFFERENTIAL METHOD: ABNORMAL
EOSINOPHIL # BLD AUTO: 0.1 K/UL (ref 0–0.5)
EOSINOPHIL NFR BLD: 1.4 % (ref 0–8)
ERYTHROCYTE [DISTWIDTH] IN BLOOD BY AUTOMATED COUNT: 14.7 % (ref 11.5–14.5)
EST. GFR  (NO RACE VARIABLE): >60 ML/MIN/1.73 M^2
ESTIMATED AVG GLUCOSE: 120 MG/DL (ref 68–131)
FSH SERPL-ACNC: 2.85 MIU/ML
GLUCOSE SERPL-MCNC: 101 MG/DL (ref 70–110)
HBA1C MFR BLD: 5.8 % (ref 4–5.6)
HCT VFR BLD AUTO: 38.6 % (ref 37–48.5)
HDLC SERPL-MCNC: 58 MG/DL (ref 40–75)
HDLC SERPL: 26.1 % (ref 20–50)
HGB BLD-MCNC: 12.3 G/DL (ref 12–16)
IMM GRANULOCYTES # BLD AUTO: 0 K/UL (ref 0–0.04)
IMM GRANULOCYTES NFR BLD AUTO: 0 % (ref 0–0.5)
LDLC SERPL CALC-MCNC: 147.2 MG/DL (ref 63–159)
LYMPHOCYTES # BLD AUTO: 2.3 K/UL (ref 1–4.8)
LYMPHOCYTES NFR BLD: 46.4 % (ref 18–48)
MCH RBC QN AUTO: 26.2 PG (ref 27–31)
MCHC RBC AUTO-ENTMCNC: 31.9 G/DL (ref 32–36)
MCV RBC AUTO: 82 FL (ref 82–98)
MONOCYTES # BLD AUTO: 0.3 K/UL (ref 0.3–1)
MONOCYTES NFR BLD: 5.5 % (ref 4–15)
NEUTROPHILS # BLD AUTO: 2.3 K/UL (ref 1.8–7.7)
NEUTROPHILS NFR BLD: 46.5 % (ref 38–73)
NONHDLC SERPL-MCNC: 164 MG/DL
NRBC BLD-RTO: 0 /100 WBC
PLATELET # BLD AUTO: 375 K/UL (ref 150–450)
PMV BLD AUTO: 10.4 FL (ref 9.2–12.9)
POTASSIUM SERPL-SCNC: 3.9 MMOL/L (ref 3.5–5.1)
PROT SERPL-MCNC: 8.4 G/DL (ref 6–8.4)
RBC # BLD AUTO: 4.69 M/UL (ref 4–5.4)
SODIUM SERPL-SCNC: 135 MMOL/L (ref 136–145)
TRIGL SERPL-MCNC: 84 MG/DL (ref 30–150)
WBC # BLD AUTO: 4.87 K/UL (ref 3.9–12.7)

## 2023-08-08 PROCEDURE — 99215 OFFICE O/P EST HI 40 MIN: CPT | Mod: S$GLB,,, | Performed by: INTERNAL MEDICINE

## 2023-08-08 PROCEDURE — 80053 COMPREHEN METABOLIC PANEL: CPT | Performed by: INTERNAL MEDICINE

## 2023-08-08 PROCEDURE — 99215 PR OFFICE/OUTPT VISIT, EST, LEVL V, 40-54 MIN: ICD-10-PCS | Mod: S$GLB,,, | Performed by: INTERNAL MEDICINE

## 2023-08-08 PROCEDURE — 82306 VITAMIN D 25 HYDROXY: CPT | Performed by: INTERNAL MEDICINE

## 2023-08-08 PROCEDURE — 99999 PR PBB SHADOW E&M-EST. PATIENT-LVL V: CPT | Mod: PBBFAC,,, | Performed by: INTERNAL MEDICINE

## 2023-08-08 PROCEDURE — 80061 LIPID PANEL: CPT | Performed by: INTERNAL MEDICINE

## 2023-08-08 PROCEDURE — 3075F SYST BP GE 130 - 139MM HG: CPT | Mod: CPTII,S$GLB,, | Performed by: INTERNAL MEDICINE

## 2023-08-08 PROCEDURE — 3079F PR MOST RECENT DIASTOLIC BLOOD PRESSURE 80-89 MM HG: ICD-10-PCS | Mod: CPTII,S$GLB,, | Performed by: INTERNAL MEDICINE

## 2023-08-08 PROCEDURE — 99999 PR PBB SHADOW E&M-EST. PATIENT-LVL V: ICD-10-PCS | Mod: PBBFAC,,, | Performed by: INTERNAL MEDICINE

## 2023-08-08 PROCEDURE — 3075F PR MOST RECENT SYSTOLIC BLOOD PRESS GE 130-139MM HG: ICD-10-PCS | Mod: CPTII,S$GLB,, | Performed by: INTERNAL MEDICINE

## 2023-08-08 PROCEDURE — 3044F PR MOST RECENT HEMOGLOBIN A1C LEVEL <7.0%: ICD-10-PCS | Mod: CPTII,S$GLB,, | Performed by: INTERNAL MEDICINE

## 2023-08-08 PROCEDURE — 3008F PR BODY MASS INDEX (BMI) DOCUMENTED: ICD-10-PCS | Mod: CPTII,S$GLB,, | Performed by: INTERNAL MEDICINE

## 2023-08-08 PROCEDURE — 83001 ASSAY OF GONADOTROPIN (FSH): CPT | Performed by: INTERNAL MEDICINE

## 2023-08-08 PROCEDURE — 3079F DIAST BP 80-89 MM HG: CPT | Mod: CPTII,S$GLB,, | Performed by: INTERNAL MEDICINE

## 2023-08-08 PROCEDURE — 83036 HEMOGLOBIN GLYCOSYLATED A1C: CPT | Performed by: INTERNAL MEDICINE

## 2023-08-08 PROCEDURE — 85025 COMPLETE CBC W/AUTO DIFF WBC: CPT | Performed by: INTERNAL MEDICINE

## 2023-08-08 PROCEDURE — 1159F MED LIST DOCD IN RCRD: CPT | Mod: CPTII,S$GLB,, | Performed by: INTERNAL MEDICINE

## 2023-08-08 PROCEDURE — 36415 COLL VENOUS BLD VENIPUNCTURE: CPT | Mod: PN | Performed by: INTERNAL MEDICINE

## 2023-08-08 PROCEDURE — 1159F PR MEDICATION LIST DOCUMENTED IN MEDICAL RECORD: ICD-10-PCS | Mod: CPTII,S$GLB,, | Performed by: INTERNAL MEDICINE

## 2023-08-08 PROCEDURE — 3044F HG A1C LEVEL LT 7.0%: CPT | Mod: CPTII,S$GLB,, | Performed by: INTERNAL MEDICINE

## 2023-08-08 PROCEDURE — 3008F BODY MASS INDEX DOCD: CPT | Mod: CPTII,S$GLB,, | Performed by: INTERNAL MEDICINE

## 2023-08-08 RX ORDER — COVID-19 MOLECULAR TEST ASSAY
KIT MISCELLANEOUS
COMMUNITY
Start: 2023-04-02

## 2023-08-08 RX ORDER — GABAPENTIN 300 MG/1
300 CAPSULE ORAL 3 TIMES DAILY
Qty: 90 CAPSULE | Refills: 11 | Status: SHIPPED | OUTPATIENT
Start: 2023-08-08 | End: 2024-08-07

## 2023-08-08 RX ORDER — LIDOCAINE 50 MG/G
1 PATCH TOPICAL EVERY 12 HOURS
Qty: 30 PATCH | Refills: 0 | Status: SHIPPED | OUTPATIENT
Start: 2023-08-08

## 2023-08-08 RX ORDER — HYDROCODONE BITARTRATE AND ACETAMINOPHEN 7.5; 325 MG/1; MG/1
1 TABLET ORAL EVERY 6 HOURS
COMMUNITY
Start: 2023-07-14

## 2023-08-08 RX ORDER — LEVOTHYROXINE SODIUM 50 UG/1
50 TABLET ORAL
COMMUNITY
Start: 2023-08-02

## 2023-08-08 RX ORDER — CEPHALEXIN 500 MG/1
500 CAPSULE ORAL 2 TIMES DAILY
COMMUNITY
Start: 2023-07-14 | End: 2023-08-08

## 2023-08-08 NOTE — ASSESSMENT & PLAN NOTE
Start gabapentin 300mg tid   Lidoderm patch 5% or Icy Hot max patch     Make appt with Dr. Kendall about fibroids/dysmenorrhea    F/u Tyra in 8/25 with labs prior: discuss left parotid mass and mucous retention cyst    Get flu and COVID (10/2023)    Schedule MMG now at [facility]     Labs today

## 2023-08-08 NOTE — PROGRESS NOTES
[facility] Progress Note      Chief Complaint     Shoulder Pain (Right shoulder, burning sensation from neck to shoulder-burning and tingling, sharp pains come and go//Since last tuesday)       Subjective:      History of Present Illness:  Serena Yang is a 53 y.o. 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 Mary  -now with severe right shoulder numbness radiating down shoulder down to fingers refractory to HC and naproxen but  improved with lidoderm patch  Takes flexeril at night to help with sleep   -has f/u appt on 8/25/2023 with Dr. Mary - will check Ca and thyroid labs prior   -currently on short-term disability     Vitamin D def'y: takes only twice per month     AR with right mucous retention cyst:  uses flonase; singulair 10mg qhs; claritin 10mg daily     Onychomosis: still using Penlac 8% nightly with improvement    GERD: still using PPI     Fibroids/dysmenorrhea: still having issues with blood clots and pain requiring Naproxen; needs appt with Dr. Kendall     Hot flashes: pt not sure if due to excessive thyroid dose or if in menopause but not able to tell due to fibroids      Past Medical History:  Past Medical History:   Diagnosis Date    Chronic bilateral low back pain without sciatica 10/18/2022       Past Surgical History:  No past surgical history on file.    Allergies:  Review of patient's allergies indicates:  No Known Allergies    Home Medications:  Current Outpatient Medications   Medication Sig Dispense Refill    ciclopirox (PENLAC) 8 % Soln Apply topically nightly. 6.6 mL 11    cyclobenzaprine (FLEXERIL) 10 MG tablet Take 0.5 tablets (5 mg total) by mouth every evening. 90 tablet 3    ergocalciferol (ERGOCALCIFEROL) 50,000 unit Cap Take 1 capsule (50,000 Units total) by mouth every 7 days. 12 capsule 3    fluticasone propionate (FLONASE NASL)       fluticasone propionate (FLONASE) 50  mcg/actuation nasal spray SHAKE LIQUID AND USE 2 SPRAYS(100 MCG) IN EACH NOSTRIL EVERY DAY 48 g 1    levothyroxine (SYNTHROID) 50 MCG tablet Take 50 mcg by mouth.      loratadine (CLARITIN) 10 mg tablet Take 1 tablet (10 mg total) by mouth once daily. 90 tablet 3    montelukast (SINGULAIR) 10 mg tablet Take 1 tablet (10 mg total) by mouth every evening. 30 tablet 11    naproxen (NAPROSYN) 500 MG tablet Take 1 tablet (500 mg total) by mouth 2 (two) times daily with meals. 60 tablet 5    pantoprazole (PROTONIX) 20 MG tablet TAKE 1 TABLET(20 MG) BY MOUTH TWICE DAILY BEFORE MEALS 180 tablet 1    BINAXNOW COVID-19 AG SELF TEST Kit TEST AS DIRECTED TODAY      gabapentin (NEURONTIN) 300 MG capsule Take 1 capsule (300 mg total) by mouth 3 (three) times daily. 90 capsule 11    HYDROcodone-acetaminophen (NORCO) 7.5-325 mg per tablet Take 1 tablet by mouth every 6 (six) hours.      LIDOcaine (LIDODERM) 5 % Place 1 patch onto the skin every 12 (twelve) hours. Remove & Discard patch within 12 hours or as directed by MD 30 patch 0     No current facility-administered medications for this visit.        Family History:  No family history on file.    Social History:  Social History     Tobacco Use    Smoking status: Never    Smokeless tobacco: Never       Review of Systems:  Pertinent positives and negatives listed in HPI. All other systems are reviewed and are negative.    Health Maintaince :   Health Maintenance Topics with due status: Not Due       Topic Last Completion Date    Influenza Vaccine 12/06/2010    Colorectal Cancer Screening 01/26/2021    Cervical Cancer Screening 04/20/2021    Lipid Panel 10/27/2022    Hemoglobin A1c (Diabetic Prevention Screening) 08/08/2023           Eye:  UTD   Dental: UTD     Immunizations:   Tdap: n/a.  Influenza: needs.  Pneumovax: n/a  Shingrex x 2: needs  COVID: needs 10/2023  Hepatitis C:   Cancer Screening:  PAP: UTD 4/2021 - repeat in 5 years  Mammogram: UTD 1/2023 bilateral breast bx  "needs repeat in 6 mo  Colonoscopy: 1/2021 - repeat in 5 years  DEXA:  n/a  LDCT: n/a    The 10-year ASCVD risk score (Joe AGUILAR, et al., 2019) is: 2.2%    Values used to calculate the score:      Age: 53 years      Sex: Female      Is Non- : Yes      Diabetic: No      Tobacco smoker: No      Systolic Blood Pressure: 130 mmHg      Is BP treated: No      HDL Cholesterol: 60 mg/dL      Total Cholesterol: 192 mg/dL      Objective:   /82 (BP Location: Left arm, Patient Position: Sitting, BP Method: Large (Manual))   Pulse 84   Temp 98.6 °F (37 °C) (Oral)   Resp 18   Ht 5' 8" (1.727 m)   Wt 110 kg (242 lb 8.1 oz)   SpO2 98%   BMI 36.87 kg/m²      Body mass index is 36.87 kg/m².       Physical Examination:  General: Alert and awake in no apparent distress  HEENT: Normocephalic and atraumatic; Tms WNL; right nasal congestion   Eyes:  PERRL; EOMi with anicteric sclera and clear conjunctivae  Mouth:  Oropharynx clear and without exudate; moist mucous membranes  Neck:   Cervical nodes not enlarged; supple; no bruits  Cardio:  Regular rate and rhythm with normal S1 and S2; no murmurs or rubs  Resp:  CTAB; respirations unlabored; no wheezes, crackles or rhonchi  Abdom: Soft, NTND with normoactive bowel sounds; negative HSM  Extrem: Warm and well-perfused with no clubbing, cyanosis or edema; Strength 5/5 RUE with paresthesias or right shoulder to fingers   Skin:  No rashes, lesions, or color changes  Pulses:  2+ and symmetric distally  Neuro:  AAOx3; cooperative and pleasant with no focal deficits    Laboratory:      Most Recent Data:  Lab Results   Component Value Date    WBC 5.0 07/13/2023    HGB 12.1 07/13/2023    HCT 36.3 (L) 07/13/2023     10/27/2022    CHOL 192 10/27/2022    TRIG 65 10/27/2022    HDL 60 10/27/2022    ALT 14 10/27/2022    AST 14 10/27/2022     (L) 10/27/2022    K 3.8 10/27/2022     10/27/2022    BUN 9 10/27/2022    CO2 23 10/27/2022    INR 0.9 " 12/16/2022    HGBA1C 5.8 (H) 08/08/2023              CBC:   WBC   Date Value Ref Range Status   07/13/2023 5.0 4.5 - 11.0 103/uL Final   10/27/2022 3.60 (L) 3.90 - 12.70 K/uL Final     Hemoglobin   Date Value Ref Range Status   07/13/2023 12.1 12.0 - 16.0 gm/dL Final   10/27/2022 12.5 12.0 - 16.0 g/dL Final     Hematocrit   Date Value Ref Range Status   07/13/2023 36.3 (L) 37.0 - 47.0 % Final   10/27/2022 39.8 37.0 - 48.5 % Final     Platelets   Date Value Ref Range Status   10/27/2022 310 150 - 450 K/uL Final     MCV   Date Value Ref Range Status   07/13/2023 81.6 81.0 - 99.0 fL Final   10/27/2022 88 82 - 98 fL Final     RDW   Date Value Ref Range Status   07/13/2023 15.1 12.0 - 15.3 % Final   10/27/2022 13.7 11.5 - 14.5 % Final     BMP:   Sodium   Date Value Ref Range Status   10/27/2022 135 (L) 136 - 145 mmol/L Final     Potassium   Date Value Ref Range Status   10/27/2022 3.8 3.5 - 5.1 mmol/L Final     Chloride   Date Value Ref Range Status   10/27/2022 101 95 - 110 mmol/L Final     CO2   Date Value Ref Range Status   10/27/2022 23 23 - 29 mmol/L Final     BUN   Date Value Ref Range Status   10/27/2022 9 6 - 20 mg/dL Final     Creatinine   Date Value Ref Range Status   10/27/2022 0.7 0.5 - 1.4 mg/dL Final     Glucose   Date Value Ref Range Status   10/27/2022 81 70 - 110 mg/dL Final     Calcium   Date Value Ref Range Status   10/27/2022 9.0 8.7 - 10.5 mg/dL Final     LFTs:   Total Protein   Date Value Ref Range Status   10/27/2022 8.4 6.0 - 8.4 g/dL Final     Albumin   Date Value Ref Range Status   10/27/2022 3.7 3.5 - 5.2 g/dL Final     Total Bilirubin   Date Value Ref Range Status   10/27/2022 0.6 0.1 - 1.0 mg/dL Final     Comment:     For infants and newborns, interpretation of results should be based  on gestational age, weight and in agreement with clinical  observations.    Premature Infant recommended reference ranges:  Up to 24 hours.............<8.0 mg/dL  Up to 48 hours............<12.0 mg/dL  3-5  days..................<15.0 mg/dL  6-29 days.................<15.0 mg/dL       AST   Date Value Ref Range Status   10/27/2022 14 10 - 40 U/L Final     Alkaline Phosphatase   Date Value Ref Range Status   10/27/2022 61 55 - 135 U/L Final     ALT   Date Value Ref Range Status   10/27/2022 14 10 - 44 U/L Final     Coags:   INR   Date Value Ref Range Status   12/16/2022 0.9 0.8 - 1.2 Final     FLP:      Lab Results   Component Value Date    CHOL 192 10/27/2022     Lab Results   Component Value Date    HDL 60 10/27/2022     Lab Results   Component Value Date    LDLCALC 119.0 10/27/2022     Lab Results   Component Value Date    TRIG 65 10/27/2022     Lab Results   Component Value Date    CHOLHDL 31.3 10/27/2022      DM:      Hemoglobin A1C   Date Value Ref Range Status   08/08/2023 5.8 (H) 4.0 - 5.6 % Final     Comment:     ADA Screening Guidelines:  5.7-6.4%  Consistent with prediabetes  >or=6.5%  Consistent with diabetes    High levels of fetal hemoglobin interfere with the HbA1C  assay. Heterozygous hemoglobin variants (HbS, HgC, etc)do  not significantly interfere with this assay.   However, presence of multiple variants may affect accuracy.     10/27/2022 5.7 (H) 4.0 - 5.6 % Final     Comment:     ADA Screening Guidelines:  5.7-6.4%  Consistent with prediabetes  >or=6.5%  Consistent with diabetes    High levels of fetal hemoglobin interfere with the HbA1C  assay. Heterozygous hemoglobin variants (HbS, HgC, etc)do  not significantly interfere with this assay.   However, presence of multiple variants may affect accuracy.       LDL Cholesterol   Date Value Ref Range Status   10/27/2022 119.0 63.0 - 159.0 mg/dL Final     Comment:     The National Cholesterol Education Program (NCEP) has set the  following guidelines (reference values) for LDL Cholesterol:  Optimal.......................<130 mg/dL  Borderline High...............130-159 mg/dL  High..........................160-189 mg/dL  Very High.....................>190  "mg/dL       Creatinine   Date Value Ref Range Status   10/27/2022 0.7 0.5 - 1.4 mg/dL Final     Thyroid: No results found for: "TSH", "FREET4", "L0AZEXT", "E5JQXNR", "THYROIDAB"  Anemia: No results found for: "IRON", "TIBC", "FERRITIN", "WBUVZDJF62", "FOLATE"  Cardiac: No results found for: "TROPONINI", "CKTOTAL", "CKMB", "BNP"  Urinalysis:   Color, UA   Date Value Ref Range Status   10/27/2022 Yellow Yellow, Straw, Maura Final     Specific Gravity, UA   Date Value Ref Range Status   10/27/2022 1.030 1.005 - 1.030 Final     Nitrite, UA   Date Value Ref Range Status   10/27/2022 Negative Negative Final     Ketones, UA   Date Value Ref Range Status   10/27/2022 Negative Negative Final       Other Results:  EKG (my interpretation):     Radiology:  US Thyroid  Narrative: EXAMINATION:  US THYROID    CLINICAL HISTORY:  Nontoxic multinodular goiter    TECHNIQUE:  Ultrasound of the thyroid and cervical lymph nodes was performed.    COMPARISON:  11/11/2022    FINDINGS:  Right lobe measures 5 x 2 x 2 cm.  Left lobe measures 3.6 x 1.2 x 1.3 cm.    Multiple bilateral thyroid nodules are present, with 4 index nodules as detailed below.    Nodule #1    Size: 4.1 cm    Location: Right lobe lower pole    Composition: mixed cystic and solid (1)    Echogenicity: hypoechoic (2)    Shape: wider-than-tall (0)    Margins: smooth (0)    Echogenic foci: macrocalcifications (1)    Change: Previously 4.4 cm by CT    TI-RADS category: TR4 (4 points) - follow up is recommended at 1, 2, 3, and 5 years if 1 cm or greater; FNA is recommended if 1.5 cm or greater.  Impression: It is my impression comparing today's exam with the CT that there is 1 dominant nodule in the lower pole on the right.  Appearance is unchanged compared to prior CT.  According to the electronic medical record, nodule has undergone interval FNA.  No new nodules.    Electronically signed by: Bina Myers  Date:    04/20/2023  Time:    14:35          Assessment/Plan "     Serena Yang is a 53 y.o. female with:  1. Inflammatory neuropathy  -     gabapentin (NEURONTIN) 300 MG capsule; Take 1 capsule (300 mg total) by mouth 3 (three) times daily.  Dispense: 90 capsule; Refill: 11  -     LIDOcaine (LIDODERM) 5 %; Place 1 patch onto the skin every 12 (twelve) hours. Remove & Discard patch within 12 hours or as directed by MD  Dispense: 30 patch; Refill: 0    2. Abnormal mammogram of both breasts  -     Mammo Digital Screening Bilat; Future; Expected date: 08/08/2023    3. Vitamin D deficiency  Assessment & Plan:  Will check Vit D level   Need to take Vit D 50,000 units weekly     Orders:  -     Vitamin D; Future; Expected date: 08/08/2023    4. Fibroids  Assessment & Plan:  Needs re-eval by Dr. Kendall       5. Dysmenorrhea  Assessment & Plan:  Cont Naproxen 500 mg bid prn with food      6. Chronic rhinosinusitis  Overview:  Right mucous retnetion cyst     Assessment & Plan:  Cont singulair, claritin, flonase  Make appt with Dr. Mary to remove mucous retention cyst      7. Neuropathy  Overview:  Post-op complication after right thyroidectomy on 7/18/2023    Assessment & Plan:  Start gabapentin 300mg tid   Lidoderm patch 5% or Icy Hot max patch       8. Mass of left parotid gland  Assessment & Plan:  F/u Dr. Mary       9. Onychomycosis of great toe  Assessment & Plan:  Cont Penlac nightly       10. Preventative health care  Overview:  7/18/2023 with dx Benign follicular nodule c/w with follicular adenoma by Dr. Mary    Assessment & Plan:  Start gabapentin 300mg tid   Lidoderm patch 5% or Icy Hot max patch     Make appt with Dr. Kendall about fibroids/dysmenorrhea    F/u Tyra in 8/25 with labs prior: discuss left parotid mass and mucous retention cyst    Get flu and COVID (10/2023)    Schedule MMG now at [facility]     Labs today     Orders:  -     Lipid Panel; Future; Expected date: 08/08/2023  -     Hemoglobin A1C; Future; Expected date: 08/08/2023  -     Urinalysis; Future;  Expected date: 08/08/2023  -     Comprehensive Metabolic Panel; Future; Expected date: 08/08/2023  -     CBC Auto Differential; Future; Expected date: 08/08/2023    11. Hot flashes  Assessment & Plan:  Check thyroid labs via Dr. Mary  Check FSH    Orders:  -     FOLLICLE STIMULATING HORMONE; Future; Expected date: 08/08/2023    12. H/O partial thyroidectomy  Overview:  S/p right thyroidectomy 7/18/2023 with dx Benign follicular nodule c/w with follicular adenoma by Dr. Mary    Assessment & Plan:  Cont HC prn and/or Naproxen prn severe pain                  I spent a total of 49 minutes on the day of the visit.This includes face to face time and non-face to face time preparing to see the patient (eg, review of tests), obtaining and/or reviewing separately obtained history, documenting clinical information in the electronic or other health record, independently interpreting results and communicating results to the patient/family/caregiver, or care coordinator.   Code Status:     Janelle Zamora MD

## 2023-08-08 NOTE — PATIENT INSTRUCTIONS
Start gabapentin 300mg tid   Lidoderm patch 5% or Icy Hot max patch     Make appt with Dr. Kendall about fibroids/dysmenorrhea    F/u Tyra in 8/25 with labs prior: discuss left parotid mass and mucous retention cyst    Get flu and COVID (10/2023)    Schedule MMG now at [facility]     Labs today

## 2023-08-28 ENCOUNTER — PATIENT MESSAGE (OUTPATIENT)
Dept: PRIMARY CARE CLINIC | Facility: CLINIC | Age: 53
End: 2023-08-28
Payer: COMMERCIAL

## 2023-10-07 DIAGNOSIS — J32.9 CHRONIC RHINOSINUSITIS: ICD-10-CM

## 2023-10-07 DIAGNOSIS — N94.6 DYSMENORRHEA: ICD-10-CM

## 2023-10-07 NOTE — TELEPHONE ENCOUNTER
Care Due:                  Date            Visit Type   Department     Provider  --------------------------------------------------------------------------------                                EP -                              PRIMARY      [facility]  Last Visit: 08-      CARE ([facility])   Care           Janelle Zamora  Next Visit: None Scheduled  None         None Found                                                            Last  Test          Frequency    Reason                     Performed    Due Date  --------------------------------------------------------------------------------    eGFR........  12 months..  naproxen.................  Not Found    Overdue    [employer] Embedded Care Due Messages. Reference number: [id number].   10/07/2023 11:24:05 AM CDT

## 2023-10-08 RX ORDER — PANTOPRAZOLE SODIUM 20 MG/1
TABLET, DELAYED RELEASE ORAL
Qty: 180 TABLET | Refills: 3 | Status: SHIPPED | OUTPATIENT
Start: 2023-10-08

## 2023-10-08 RX ORDER — FLUTICASONE PROPIONATE 50 MCG
SPRAY, SUSPENSION (ML) NASAL
Qty: 48 G | Refills: 3 | Status: SHIPPED | OUTPATIENT
Start: 2023-10-08

## 2023-10-08 NOTE — TELEPHONE ENCOUNTER
Provider Staff:  Action required for this patient     Please see care gap opportunities below in Care Due Message.    Thanks!  [facility]     Appointments      Date Provider   Last Visit   8/8/2023 Janelle Zamora MD   Next Visit   Visit date not found Janelle Zamora MD      Refill Decision Note   Serena Yang  is requesting a refill authorization.  Brief Assessment and Rationale for Refill:  Approve     Medication Therapy Plan:         Comments:     Note composed:1:47 AM 10/08/2023

## 2023-11-04 DIAGNOSIS — E55.9 VITAMIN D DEFICIENCY: ICD-10-CM

## 2023-11-06 DIAGNOSIS — N94.6 DYSMENORRHEA: ICD-10-CM

## 2023-11-06 RX ORDER — ERGOCALCIFEROL 1.25 MG/1
50000 CAPSULE ORAL
Qty: 12 CAPSULE | Refills: 3 | Status: SHIPPED | OUTPATIENT
Start: 2023-11-06

## 2023-11-06 RX ORDER — NAPROXEN 500 MG/1
500 TABLET ORAL 2 TIMES DAILY WITH MEALS
Qty: 60 TABLET | Refills: 5 | Status: SHIPPED | OUTPATIENT
Start: 2023-11-06

## 2023-11-06 NOTE — TELEPHONE ENCOUNTER
No care due was identified.  [facility] Embedded Care Due Messages. Reference number: [id number].   11/06/2023 4:05:46 AM CST

## 2023-11-13 PROBLEM — Z00.00 PREVENTATIVE HEALTH CARE: Status: RESOLVED | Noted: 2022-10-18 | Resolved: 2023-11-13

## 2023-11-16 DIAGNOSIS — J32.9 CHRONIC RHINOSINUSITIS: ICD-10-CM

## 2023-11-16 RX ORDER — MONTELUKAST SODIUM 10 MG/1
10 TABLET ORAL NIGHTLY
Qty: 90 TABLET | Refills: 2 | Status: SHIPPED | OUTPATIENT
Start: 2023-11-16

## 2023-11-16 NOTE — TELEPHONE ENCOUNTER
Refill Decision Note   Serena Yang  is requesting a refill authorization.  Brief Assessment and Rationale for Refill:  Approve     Medication Therapy Plan:         Comments:     Note composed:5:04 PM 11/16/2023

## 2023-11-16 NOTE — TELEPHONE ENCOUNTER
No care due was identified.  Health [facility] Embedded Care Due Messages. Reference number: [id number].   11/16/2023 4:05:14 AM CST

## 2023-11-29 DIAGNOSIS — J32.9 CHRONIC RHINOSINUSITIS: ICD-10-CM

## 2023-11-29 RX ORDER — LORATADINE 10 MG/1
10 TABLET ORAL
Qty: 90 TABLET | Refills: 2 | Status: SHIPPED | OUTPATIENT
Start: 2023-11-29

## 2023-11-29 NOTE — TELEPHONE ENCOUNTER
Refill Decision Note   Serena Yang  is requesting a refill authorization.  Brief Assessment and Rationale for Refill:  Approve     Medication Therapy Plan:         Comments:     Note composed:12:16 PM 11/29/2023

## 2023-11-29 NOTE — TELEPHONE ENCOUNTER
No care due was identified.  Health [facility] Embedded Care Due Messages. Reference number: [id number].   11/29/2023 10:08:15 AM CST

## 2024-01-02 ENCOUNTER — PATIENT MESSAGE (OUTPATIENT)
Dept: PRIMARY CARE CLINIC | Facility: CLINIC | Age: 54
End: 2024-01-02
Payer: COMMERCIAL

## 2024-01-04 ENCOUNTER — PATIENT MESSAGE (OUTPATIENT)
Dept: PRIMARY CARE CLINIC | Facility: CLINIC | Age: 54
End: 2024-01-04
Payer: COMMERCIAL

## 2024-02-07 ENCOUNTER — TELEPHONE (OUTPATIENT)
Dept: PRIMARY CARE CLINIC | Facility: CLINIC | Age: 54
End: 2024-02-07
Payer: COMMERCIAL

## 2024-02-07 NOTE — TELEPHONE ENCOUNTER
Appointment Request   Serena Yang   Sent:  10:00 AM   To: [facility] Clinical Support Staff      Serena Yang   MRN: [id number] : 1970   Pt Home: [phone]     Entered: [phone]        Message    Appointment Request From: Serena Yang      With Provider: Janelle Zamora MD [[facility]]      Preferred Date Range: 2024 - 2024      Preferred Times: Any Time      Reason for visit: Med adjustments,      Comments:   Thyroid medication, is making me feel hungry all day, weight gain

## 2024-02-15 ENCOUNTER — OFFICE VISIT (OUTPATIENT)
Dept: PRIMARY CARE CLINIC | Facility: CLINIC | Age: 54
End: 2024-02-15
Payer: COMMERCIAL

## 2024-02-15 DIAGNOSIS — E66.09 CLASS 2 OBESITY DUE TO EXCESS CALORIES WITH BODY MASS INDEX (BMI) OF 36.0 TO 36.9 IN ADULT, UNSPECIFIED WHETHER SERIOUS COMORBIDITY PRESENT: ICD-10-CM

## 2024-02-15 DIAGNOSIS — E89.0 H/O PARTIAL THYROIDECTOMY: Primary | ICD-10-CM

## 2024-02-15 PROCEDURE — 99213 OFFICE O/P EST LOW 20 MIN: CPT | Mod: 95,,, | Performed by: INTERNAL MEDICINE

## 2024-02-15 NOTE — ASSESSMENT & PLAN NOTE
Need to get thyroid labs drawn in 12/22023 - will contact Dr. Tyra Austin 50mcg po daily 30 min prior to breakfast.   Start to regularly exercise 30 min 5 times per week  Exercise 30 min 5 times per week, decrease portion sizes by 50%; encourage plant-based diet;  drink 6-8 glasses of water daily, avoid fast foods, creamy, rich foods jasmin ice cream, cheese creamy salad dressings;avoid eating 3 hours prior to bedtime; consider 8-10 hour intermittent diet; avoid simple sugars jasmin white bread, rice, potatoes, noodles/pasta; No sweetened drinks.

## 2024-02-15 NOTE — PROGRESS NOTES
[facility] Progress Note    Audiovisual Visit  Location:  [place], [place]      Chief Complaint     No chief complaint on file.       Subjective:      History of Present Illness:  Serena Yang is a 53 y.o. 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 Mary  -now with improved right shoulder numbness radiating down shoulder down to fingers  using lidoderm patch prn  -now working full-time from home; walks intemittently but not consistently   -had thyroid tests done in 12/2023 with Dr. Prince but needs results  -Pt c/o of being very hungry all day and tired mid-day despite compliance with Levo 50mcg daily - gained 12 lbs since 8/2023 but admits to eating voraciously     Enlarged tonsils: Dr. Mary ordered a Sleep Study but not yet done    /73     Past Medical History:  Past Medical History:   Diagnosis Date    Chronic bilateral low back pain without sciatica 10/18/2022       Past Surgical History:  No past surgical history on file.    Allergies:  Review of patient's allergies indicates:  No Known Allergies    Home Medications:  Current Outpatient Medications   Medication Sig Dispense Refill    BINAXNOW COVID-19 AG SELF TEST Kit TEST AS DIRECTED TODAY      ciclopirox (PENLAC) 8 % Soln Apply topically nightly. 6.6 mL 11    cyclobenzaprine (FLEXERIL) 10 MG tablet Take 0.5 tablets (5 mg total) by mouth every evening. 90 tablet 3    ergocalciferol (ERGOCALCIFEROL) 50,000 unit Cap TAKE 1 CAPSULE BY MOUTH EVERY 7 DAYS 12 capsule 3    fluticasone propionate (FLONASE NASL)       fluticasone propionate (FLONASE) 50 mcg/actuation nasal spray SHAKE LIQUID AND USE 2 SPRAYS(100 MCG) IN EACH NOSTRIL EVERY DAY 48 g 3    gabapentin (NEURONTIN) 300 MG capsule Take 1 capsule (300 mg total) by mouth 3 (three) times daily. 90 capsule 11    HYDROcodone-acetaminophen (NORCO) 7.5-325 mg per tablet Take 1 tablet by mouth every  6 (six) hours.      levothyroxine (SYNTHROID) 50 MCG tablet Take 50 mcg by mouth.      LIDOcaine (LIDODERM) 5 % Place 1 patch onto the skin every 12 (twelve) hours. Remove & Discard patch within 12 hours or as directed by MD 30 patch 0    loratadine (CLARITIN) 10 mg tablet TAKE 1 TABLET BY MOUTH ONCE DAILY 90 tablet 2    montelukast (SINGULAIR) 10 mg tablet TAKE 1 TABLET(10 MG) BY MOUTH EVERY EVENING 90 tablet 2    naproxen (NAPROSYN) 500 MG tablet TAKE 1 TABLET(500 MG) BY MOUTH TWICE DAILY WITH MEALS 60 tablet 5    pantoprazole (PROTONIX) 20 MG tablet TAKE 1 TABLET(20 MG) BY MOUTH TWICE DAILY BEFORE MEALS 180 tablet 3     No current facility-administered medications for this visit.        Family History:  No family history on file.    Social History:  Social History     Tobacco Use    Smoking status: Never    Smokeless tobacco: Never       Review of Systems:  Pertinent positives and negatives listed in HPI. All other systems are reviewed and are negative.    Health Maintaince :   Health Maintenance Topics with due status: Not Due       Topic Last Completion Date    Colorectal Cancer Screening 01/26/2021    Cervical Cancer Screening 04/20/2021    Hemoglobin A1c (Prediabetes) 08/08/2023    Lipid Panel 08/08/2023    Mammogram 12/19/2023           Eye:   Dental:     Immunizations:     The 10-year ASCVD risk score (Joe AGUILAR, et al., 2019) is: 3.3%    Values used to calculate the score:      Age: 53 years      Sex: Female      Is Non- : Yes      Diabetic: No      Tobacco smoker: No      Systolic Blood Pressure: 139 mmHg      Is BP treated: No      HDL Cholesterol: 58 mg/dL      Total Cholesterol: 222 mg/dL      Objective:   There were no vitals taken for this visit.     There is no height or weight on file to calculate BMI.       Physical Examination:  General: Alert and awake in no apparent distress  Neuro:  AAOx3; cooperative and pleasant with no focal deficits    Laboratory:      Most Recent  Data:  Lab Results   Component Value Date    WBC 4.87 08/08/2023    HGB 12.3 08/08/2023    HCT 38.6 08/08/2023     08/08/2023    CHOL 222 (H) 08/08/2023    TRIG 84 08/08/2023    HDL 58 08/08/2023    ALT 13 08/08/2023    AST 14 08/08/2023     (L) 08/08/2023    K 3.9 08/08/2023     08/08/2023    BUN 10 08/08/2023    CO2 20 (L) 08/08/2023    INR 0.9 12/16/2022    HGBA1C 5.8 (H) 08/08/2023              CBC:   WBC   Date Value Ref Range Status   08/08/2023 4.87 3.90 - 12.70 K/uL Final     Hemoglobin   Date Value Ref Range Status   08/08/2023 12.3 12.0 - 16.0 g/dL Final     Hematocrit   Date Value Ref Range Status   08/08/2023 38.6 37.0 - 48.5 % Final     Platelets   Date Value Ref Range Status   08/08/2023 375 150 - 450 K/uL Final     MCV   Date Value Ref Range Status   08/08/2023 82 82 - 98 fL Final     RDW   Date Value Ref Range Status   08/08/2023 14.7 (H) 11.5 - 14.5 % Final     BMP:   Sodium   Date Value Ref Range Status   08/08/2023 135 (L) 136 - 145 mmol/L Final     Potassium   Date Value Ref Range Status   08/08/2023 3.9 3.5 - 5.1 mmol/L Final     Chloride   Date Value Ref Range Status   08/08/2023 102 95 - 110 mmol/L Final     CO2   Date Value Ref Range Status   08/08/2023 20 (L) 23 - 29 mmol/L Final     BUN   Date Value Ref Range Status   08/08/2023 10 6 - 20 mg/dL Final     Creatinine   Date Value Ref Range Status   08/08/2023 0.7 0.5 - 1.4 mg/dL Final     Glucose   Date Value Ref Range Status   08/08/2023 101 70 - 110 mg/dL Final     Calcium   Date Value Ref Range Status   08/08/2023 9.4 8.7 - 10.5 mg/dL Final     LFTs:   Total Protein   Date Value Ref Range Status   08/08/2023 8.4 6.0 - 8.4 g/dL Final     Albumin   Date Value Ref Range Status   08/08/2023 3.8 3.5 - 5.2 g/dL Final     Total Bilirubin   Date Value Ref Range Status   08/08/2023 0.5 0.1 - 1.0 mg/dL Final     Comment:     For infants and newborns, interpretation of results should be based  on gestational age, weight and in  agreement with clinical  observations.    Premature Infant recommended reference ranges:  Up to 24 hours.............<8.0 mg/dL  Up to 48 hours............<12.0 mg/dL  3-5 days..................<15.0 mg/dL  6-29 days.................<15.0 mg/dL       AST   Date Value Ref Range Status   08/08/2023 14 10 - 40 U/L Final     Alkaline Phosphatase   Date Value Ref Range Status   08/08/2023 54 (L) 55 - 135 U/L Final     ALT   Date Value Ref Range Status   08/08/2023 13 10 - 44 U/L Final     Coags:   INR   Date Value Ref Range Status   12/16/2022 0.9 0.8 - 1.2 Final     FLP:      Lab Results   Component Value Date    CHOL 222 (H) 08/08/2023    CHOL 192 10/27/2022     Lab Results   Component Value Date    HDL 58 08/08/2023    HDL 60 10/27/2022     Lab Results   Component Value Date    LDLCALC 147.2 08/08/2023    LDLCALC 119.0 10/27/2022     Lab Results   Component Value Date    TRIG 84 08/08/2023    TRIG 65 10/27/2022     Lab Results   Component Value Date    CHOLHDL 26.1 08/08/2023    CHOLHDL 31.3 10/27/2022      DM:      Hemoglobin A1C   Date Value Ref Range Status   08/08/2023 5.8 (H) 4.0 - 5.6 % Final     Comment:     ADA Screening Guidelines:  5.7-6.4%  Consistent with prediabetes  >or=6.5%  Consistent with diabetes    High levels of fetal hemoglobin interfere with the HbA1C  assay. Heterozygous hemoglobin variants (HbS, HgC, etc)do  not significantly interfere with this assay.   However, presence of multiple variants may affect accuracy.     10/27/2022 5.7 (H) 4.0 - 5.6 % Final     Comment:     ADA Screening Guidelines:  5.7-6.4%  Consistent with prediabetes  >or=6.5%  Consistent with diabetes    High levels of fetal hemoglobin interfere with the HbA1C  assay. Heterozygous hemoglobin variants (HbS, HgC, etc)do  not significantly interfere with this assay.   However, presence of multiple variants may affect accuracy.       LDL Cholesterol   Date Value Ref Range Status   08/08/2023 147.2 63.0 - 159.0 mg/dL Final      "Comment:     The National Cholesterol Education Program (NCEP) has set the  following guidelines (reference values) for LDL Cholesterol:  Optimal.......................<130 mg/dL  Borderline High...............130-159 mg/dL  High..........................160-189 mg/dL  Very High.....................>190 mg/dL       Creatinine   Date Value Ref Range Status   08/08/2023 0.7 0.5 - 1.4 mg/dL Final     Thyroid: No results found for: "TSH", "FREET4", "B8TOTPA", "V4UOHKI", "THYROIDAB"  Anemia: No results found for: "IRON", "TIBC", "FERRITIN", "RDWKPSWU24", "FOLATE"  Cardiac: No results found for: "TROPONINI", "CKTOTAL", "CKMB", "BNP"  Urinalysis:   Color, UA   Date Value Ref Range Status   08/08/2023 Colorless (A) Yellow, Straw, Maura Final     Specific Gravity, UA   Date Value Ref Range Status   08/08/2023 1.010 1.005 - 1.030 Final     Nitrite, UA   Date Value Ref Range Status   08/08/2023 Negative Negative Final     Ketones, UA   Date Value Ref Range Status   08/08/2023 Negative Negative Final       Other Results:  EKG (my interpretation):     Radiology:  US Thyroid  Narrative: EXAMINATION:  US THYROID    CLINICAL HISTORY:  Nontoxic multinodular goiter    TECHNIQUE:  Ultrasound of the thyroid and cervical lymph nodes was performed.    COMPARISON:  11/11/2022    FINDINGS:  Right lobe measures 5 x 2 x 2 cm.  Left lobe measures 3.6 x 1.2 x 1.3 cm.    Multiple bilateral thyroid nodules are present, with 4 index nodules as detailed below.    Nodule #1    Size: 4.1 cm    Location: Right lobe lower pole    Composition: mixed cystic and solid (1)    Echogenicity: hypoechoic (2)    Shape: wider-than-tall (0)    Margins: smooth (0)    Echogenic foci: macrocalcifications (1)    Change: Previously 4.4 cm by CT    TI-RADS category: TR4 (4 points) - follow up is recommended at 1, 2, 3, and 5 years if 1 cm or greater; FNA is recommended if 1.5 cm or greater.  Impression: It is my impression comparing today's exam with the CT that there " is 1 dominant nodule in the lower pole on the right.  Appearance is unchanged compared to prior CT.  According to the electronic medical record, nodule has undergone interval FNA.  No new nodules.    Electronically signed by: Bina Myers  Date:    04/20/2023  Time:    14:35          Assessment/Plan     Serena Yang is a 53 y.o. female with:  1. H/O partial thyroidectomy  Overview:  S/p right thyroidectomy 7/18/2023 with dx Benign follicular nodule c/w with follicular adenoma by Dr. Mary    Assessment & Plan:  Need to get thyroid labs drawn in 12/22023 - will contact Dr. Mary   Cont Levo 50mcg po daily 30 min prior to breakfast.   Start to regularly exercise 30 min 5 times per week  Exercise 30 min 5 times per week, decrease portion sizes by 50%; encourage plant-based diet;  drink 6-8 glasses of water daily, avoid fast foods, creamy, rich foods jasmin ice cream, cheese creamy salad dressings;avoid eating 3 hours prior to bedtime; consider 8-10 hour intermittent diet; avoid simple sugars jasmin white bread, rice, potatoes, noodles/pasta; No sweetened drinks.       2. Class 2 obesity due to excess calories with body mass index (BMI) of 36.0 to 36.9 in adult, unspecified whether serious comorbidity present  Assessment & Plan:  Check thyroid labs drawn 12/2023 by Dr. Mary  Exercise 30 min 5 times per week, decrease portion sizes by 50%; encourage plant-based diet;  drink 6-8 glasses of water daily, avoid fast foods, creamy, rich foods jasmin ice cream, cheese creamy salad dressings;avoid eating 3 hours prior to bedtime; consider 8-10 hour intermittent diet; avoid simple sugars jasmin white bread, rice, potatoes, noodles/pasta; No sweetened drinks.                I spent 22 min with this pt that includes face to face time and non-face to face time preparing to see the patient (eg, review of tests), obtaining and/or reviewing separately obtained history, documenting clinical information in the electronic or other  health record, independently interpreting results and communicating results to the patient/family/caregiver, or care coordinator.   Code Status:     Janelle Zamora MD

## 2024-02-15 NOTE — ASSESSMENT & PLAN NOTE
Check thyroid labs drawn 12/2023 by Dr. Mary  Exercise 30 min 5 times per week, decrease portion sizes by 50%; encourage plant-based diet;  drink 6-8 glasses of water daily, avoid fast foods, creamy, rich foods jasmin ice cream, cheese creamy salad dressings;avoid eating 3 hours prior to bedtime; consider 8-10 hour intermittent diet; avoid simple sugars jasmin white bread, rice, potatoes, noodles/pasta; No sweetened drinks.

## 2024-04-15 ENCOUNTER — PATIENT MESSAGE (OUTPATIENT)
Dept: ADMINISTRATIVE | Facility: HOSPITAL | Age: 54
End: 2024-04-15
Payer: COMMERCIAL

## 2024-04-22 ENCOUNTER — PATIENT OUTREACH (OUTPATIENT)
Dept: ADMINISTRATIVE | Facility: HOSPITAL | Age: 54
End: 2024-04-22
Payer: COMMERCIAL

## 2024-04-30 ENCOUNTER — LAB VISIT (OUTPATIENT)
Dept: LAB | Facility: HOSPITAL | Age: 54
End: 2024-04-30
Attending: INTERNAL MEDICINE
Payer: COMMERCIAL

## 2024-04-30 ENCOUNTER — PATIENT MESSAGE (OUTPATIENT)
Dept: PRIMARY CARE CLINIC | Facility: CLINIC | Age: 54
End: 2024-04-30
Payer: COMMERCIAL

## 2024-04-30 ENCOUNTER — OFFICE VISIT (OUTPATIENT)
Dept: PRIMARY CARE CLINIC | Facility: CLINIC | Age: 54
End: 2024-04-30
Payer: COMMERCIAL

## 2024-04-30 VITALS
BODY MASS INDEX: 37.49 KG/M2 | WEIGHT: 247.38 LBS | OXYGEN SATURATION: 98 % | HEIGHT: 68 IN | RESPIRATION RATE: 14 BRPM | SYSTOLIC BLOOD PRESSURE: 134 MMHG | DIASTOLIC BLOOD PRESSURE: 82 MMHG | HEART RATE: 89 BPM

## 2024-04-30 DIAGNOSIS — E55.9 VITAMIN D DEFICIENCY: ICD-10-CM

## 2024-04-30 DIAGNOSIS — R06.81 WITNESSED EPISODE OF APNEA: Primary | ICD-10-CM

## 2024-04-30 DIAGNOSIS — K21.9 GASTROESOPHAGEAL REFLUX DISEASE WITHOUT ESOPHAGITIS: ICD-10-CM

## 2024-04-30 DIAGNOSIS — D50.0 IRON DEFICIENCY ANEMIA DUE TO CHRONIC BLOOD LOSS: ICD-10-CM

## 2024-04-30 DIAGNOSIS — E89.0 H/O PARTIAL THYROIDECTOMY: ICD-10-CM

## 2024-04-30 DIAGNOSIS — R23.2 HOT FLASHES: ICD-10-CM

## 2024-04-30 DIAGNOSIS — E03.9 HYPOTHYROIDISM, UNSPECIFIED TYPE: Primary | ICD-10-CM

## 2024-04-30 DIAGNOSIS — M22.2X2 PATELLOFEMORAL SYNDROME, BILATERAL: ICD-10-CM

## 2024-04-30 DIAGNOSIS — J32.9 CHRONIC RHINOSINUSITIS: ICD-10-CM

## 2024-04-30 DIAGNOSIS — G62.9 NEUROPATHY: ICD-10-CM

## 2024-04-30 DIAGNOSIS — N94.6 DYSMENORRHEA: ICD-10-CM

## 2024-04-30 DIAGNOSIS — K11.8 MASS OF LEFT PAROTID GLAND: ICD-10-CM

## 2024-04-30 DIAGNOSIS — E66.09 CLASS 2 OBESITY DUE TO EXCESS CALORIES WITH BODY MASS INDEX (BMI) OF 37.0 TO 37.9 IN ADULT, UNSPECIFIED WHETHER SERIOUS COMORBIDITY PRESENT: ICD-10-CM

## 2024-04-30 DIAGNOSIS — G47.19 DAYTIME HYPERSOMNOLENCE: ICD-10-CM

## 2024-04-30 DIAGNOSIS — M22.2X1 PATELLOFEMORAL SYNDROME, BILATERAL: ICD-10-CM

## 2024-04-30 DIAGNOSIS — D21.9 FIBROIDS: ICD-10-CM

## 2024-04-30 DIAGNOSIS — Z00.00 PREVENTATIVE HEALTH CARE: ICD-10-CM

## 2024-04-30 DIAGNOSIS — K59.01 SLOW TRANSIT CONSTIPATION: ICD-10-CM

## 2024-04-30 PROBLEM — R06.83 SNORING: Status: RESOLVED | Noted: 2024-04-30 | Resolved: 2024-04-30

## 2024-04-30 PROBLEM — B35.1 ONYCHOMYCOSIS OF GREAT TOE: Status: RESOLVED | Noted: 2022-10-18 | Resolved: 2024-04-30

## 2024-04-30 PROBLEM — R06.83 SNORING: Status: ACTIVE | Noted: 2024-04-30

## 2024-04-30 PROBLEM — J34.2 NASAL SEPTAL DEVIATION: Status: RESOLVED | Noted: 2022-10-18 | Resolved: 2024-04-30

## 2024-04-30 LAB
25(OH)D3+25(OH)D2 SERPL-MCNC: 30 NG/ML (ref 30–96)
ALBUMIN SERPL BCP-MCNC: 3.8 G/DL (ref 3.5–5.2)
ALP SERPL-CCNC: 51 U/L (ref 55–135)
ALT SERPL W/O P-5'-P-CCNC: 17 U/L (ref 10–44)
ANION GAP SERPL CALC-SCNC: 12 MMOL/L (ref 8–16)
AST SERPL-CCNC: 17 U/L (ref 10–40)
BASOPHILS # BLD AUTO: 0.01 K/UL (ref 0–0.2)
BASOPHILS NFR BLD: 0.2 % (ref 0–1.9)
BILIRUB SERPL-MCNC: 0.7 MG/DL (ref 0.1–1)
BUN SERPL-MCNC: 8 MG/DL (ref 6–20)
CALCIUM SERPL-MCNC: 9.6 MG/DL (ref 8.7–10.5)
CHLORIDE SERPL-SCNC: 100 MMOL/L (ref 95–110)
CHOLEST SERPL-MCNC: 218 MG/DL (ref 120–199)
CHOLEST/HDLC SERPL: 3.3 {RATIO} (ref 2–5)
CO2 SERPL-SCNC: 24 MMOL/L (ref 23–29)
CREAT SERPL-MCNC: 0.8 MG/DL (ref 0.5–1.4)
DIFFERENTIAL METHOD BLD: ABNORMAL
EOSINOPHIL # BLD AUTO: 0.1 K/UL (ref 0–0.5)
EOSINOPHIL NFR BLD: 1.4 % (ref 0–8)
ERYTHROCYTE [DISTWIDTH] IN BLOOD BY AUTOMATED COUNT: 17.5 % (ref 11.5–14.5)
EST. GFR  (NO RACE VARIABLE): >60 ML/MIN/1.73 M^2
ESTIMATED AVG GLUCOSE: 123 MG/DL (ref 68–131)
FSH SERPL-ACNC: 16.17 MIU/ML
GLUCOSE SERPL-MCNC: 96 MG/DL (ref 70–110)
HBA1C MFR BLD: 5.9 % (ref 4–5.6)
HCT VFR BLD AUTO: 34.9 % (ref 37–48.5)
HDLC SERPL-MCNC: 67 MG/DL (ref 40–75)
HDLC SERPL: 30.7 % (ref 20–50)
HGB BLD-MCNC: 10.3 G/DL (ref 12–16)
IMM GRANULOCYTES # BLD AUTO: 0.01 K/UL (ref 0–0.04)
IMM GRANULOCYTES NFR BLD AUTO: 0.2 % (ref 0–0.5)
LDLC SERPL CALC-MCNC: 134.8 MG/DL (ref 63–159)
LYMPHOCYTES # BLD AUTO: 2.1 K/UL (ref 1–4.8)
LYMPHOCYTES NFR BLD: 46.9 % (ref 18–48)
MCH RBC QN AUTO: 22 PG (ref 27–31)
MCHC RBC AUTO-ENTMCNC: 29.5 G/DL (ref 32–36)
MCV RBC AUTO: 75 FL (ref 82–98)
MONOCYTES # BLD AUTO: 0.5 K/UL (ref 0.3–1)
MONOCYTES NFR BLD: 10.8 % (ref 4–15)
NEUTROPHILS # BLD AUTO: 1.8 K/UL (ref 1.8–7.7)
NEUTROPHILS NFR BLD: 40.5 % (ref 38–73)
NONHDLC SERPL-MCNC: 151 MG/DL
NRBC BLD-RTO: 0 /100 WBC
PLATELET # BLD AUTO: 398 K/UL (ref 150–450)
PMV BLD AUTO: 10.1 FL (ref 9.2–12.9)
POTASSIUM SERPL-SCNC: 3.8 MMOL/L (ref 3.5–5.1)
PROT SERPL-MCNC: 8.8 G/DL (ref 6–8.4)
RBC # BLD AUTO: 4.68 M/UL (ref 4–5.4)
SODIUM SERPL-SCNC: 136 MMOL/L (ref 136–145)
T3 SERPL-MCNC: 118 NG/DL (ref 60–180)
T4 FREE SERPL-MCNC: 0.9 NG/DL (ref 0.71–1.51)
TRIGL SERPL-MCNC: 81 MG/DL (ref 30–150)
TSH SERPL DL<=0.005 MIU/L-ACNC: 4.64 UIU/ML (ref 0.4–4)
WBC # BLD AUTO: 4.37 K/UL (ref 3.9–12.7)

## 2024-04-30 PROCEDURE — 99396 PREV VISIT EST AGE 40-64: CPT | Mod: 25,S$GLB,, | Performed by: INTERNAL MEDICINE

## 2024-04-30 PROCEDURE — 99999 PR PBB SHADOW E&M-EST. PATIENT-LVL V: CPT | Mod: PBBFAC,,, | Performed by: INTERNAL MEDICINE

## 2024-04-30 PROCEDURE — 83036 HEMOGLOBIN GLYCOSYLATED A1C: CPT | Performed by: INTERNAL MEDICINE

## 2024-04-30 PROCEDURE — 82306 VITAMIN D 25 HYDROXY: CPT | Performed by: INTERNAL MEDICINE

## 2024-04-30 PROCEDURE — 84443 ASSAY THYROID STIM HORMONE: CPT | Performed by: INTERNAL MEDICINE

## 2024-04-30 PROCEDURE — 84480 ASSAY TRIIODOTHYRONINE (T3): CPT | Performed by: INTERNAL MEDICINE

## 2024-04-30 PROCEDURE — 3044F HG A1C LEVEL LT 7.0%: CPT | Mod: CPTII,S$GLB,, | Performed by: INTERNAL MEDICINE

## 2024-04-30 PROCEDURE — 3079F DIAST BP 80-89 MM HG: CPT | Mod: CPTII,S$GLB,, | Performed by: INTERNAL MEDICINE

## 2024-04-30 PROCEDURE — 80053 COMPREHEN METABOLIC PANEL: CPT | Performed by: INTERNAL MEDICINE

## 2024-04-30 PROCEDURE — 85025 COMPLETE CBC W/AUTO DIFF WBC: CPT | Performed by: INTERNAL MEDICINE

## 2024-04-30 PROCEDURE — 3075F SYST BP GE 130 - 139MM HG: CPT | Mod: CPTII,S$GLB,, | Performed by: INTERNAL MEDICINE

## 2024-04-30 PROCEDURE — 80061 LIPID PANEL: CPT | Performed by: INTERNAL MEDICINE

## 2024-04-30 PROCEDURE — 3008F BODY MASS INDEX DOCD: CPT | Mod: CPTII,S$GLB,, | Performed by: INTERNAL MEDICINE

## 2024-04-30 PROCEDURE — 83001 ASSAY OF GONADOTROPIN (FSH): CPT | Performed by: INTERNAL MEDICINE

## 2024-04-30 PROCEDURE — 36415 COLL VENOUS BLD VENIPUNCTURE: CPT | Mod: PN | Performed by: INTERNAL MEDICINE

## 2024-04-30 PROCEDURE — 84439 ASSAY OF FREE THYROXINE: CPT | Performed by: INTERNAL MEDICINE

## 2024-04-30 PROCEDURE — 82728 ASSAY OF FERRITIN: CPT | Performed by: INTERNAL MEDICINE

## 2024-04-30 PROCEDURE — 1159F MED LIST DOCD IN RCRD: CPT | Mod: CPTII,S$GLB,, | Performed by: INTERNAL MEDICINE

## 2024-04-30 RX ORDER — FERROUS SULFATE 325(65) MG
TABLET ORAL
Qty: 90 TABLET | Refills: 3 | Status: SHIPPED | OUTPATIENT
Start: 2024-04-30

## 2024-04-30 RX ORDER — LIDOCAINE 50 MG/G
1 PATCH TOPICAL DAILY
Qty: 30 PATCH | Refills: 3 | Status: SHIPPED | OUTPATIENT
Start: 2024-04-30

## 2024-04-30 RX ORDER — FAMOTIDINE 40 MG/1
40 TABLET, FILM COATED ORAL DAILY
Qty: 90 TABLET | Refills: 3 | Status: SHIPPED | OUTPATIENT
Start: 2024-04-30 | End: 2025-04-30

## 2024-04-30 RX ORDER — LEVOTHYROXINE SODIUM 50 UG/1
50 TABLET ORAL
Qty: 30 TABLET | Refills: 11 | Status: SHIPPED | OUTPATIENT
Start: 2024-04-30 | End: 2024-05-01

## 2024-04-30 RX ORDER — ERGOCALCIFEROL 1.25 MG/1
50000 CAPSULE ORAL
Qty: 12 CAPSULE | Refills: 3 | Status: SHIPPED | OUTPATIENT
Start: 2024-04-30

## 2024-04-30 NOTE — PROGRESS NOTES
[facility]/Pediatrics Progress Note      Chief Complaint     Annual Exam       Subjective:      History of Present Illness:  Serena Yang is a 53 y.o. female     C/o bilateral knee welling and pain after she twisted her left knee in 2024 when walking on gravel during mom's  76 y/o. She has been exercising on the treadmill up to 30 min twice per day 5 days per week since 2024 which has exacerbated the pain. She states her tennis shoes are comfortable.. Knee pain persistsdespite using Aspercreme, biofreeze, lidoderm 5%patches  voltaren gel   -she saw Dr. Angela for same issue in the past and had MRI of right knee and dx'ed with patellofemoral syndrome and had PT with improvement. Taking naproxen prn but causes LE swelling.     Vitamin D def'y: takes vit D 50,000 IU 3 times per month      AR with right mucous retention cyst:  uses flonase; singulair 10mg qhs; claritin 10mg daily     Mass left parotid gland: still needs FNA by Dr. Horan - last saw him in 2023    Bilateral enlarged tonsils with snoring with daytime hypersomnolence - Dr. Horan wants her to get a Sleep Study before insurance approves tonsillectomy  -her hubby has witnessed her apneic episodes and must awaken her from sleep      Onychomosis: resolved after using Penlac 8% nightly x 1 year    Constipation: hard stool every 2-3 days     Right hand neuropathy post right thyroidectomy: takes gabapentin 300mg bid     Regular menstrual cycle with improved menstrual cramps lasts 3-4 days;     GERD: still using PPI      Fibroids/dysmenorrhea: with 3 fibroids based on TVS 2023 still having issues with blood clots and pain requiring Naproxen; needs appt with Dr. Kendall      Hot flashes: pt not sure if due to excessive thyroid dose or if in menopause but not able to tell due to fibroids    SH: oldest of 3 girls; no tobacco, drugs, alcohol; lives in [place] with samantha; has 34 y/o son who lives in [place]   FH: mom endometrial  cancer, epilepsy post head trauma; CHF, HTN: sisters HTN: dad DM II, HTN 79 y/o in [place]       Past Medical History:  Past Medical History:   Diagnosis Date    Chronic bilateral low back pain without sciatica 10/18/2022    Hot flashes 08/08/2023    Nasal septal deviation 10/18/2022    Onychomycosis of great toe 10/18/2022    Snoring 04/30/2024    Enlarged tonsils         Past Surgical History:  No past surgical history on file.    Allergies:  Review of patient's allergies indicates:  No Known Allergies    Home Medications:  Current Outpatient Medications   Medication Sig Dispense Refill    cyclobenzaprine (FLEXERIL) 10 MG tablet Take 0.5 tablets (5 mg total) by mouth every evening. 90 tablet 3    fluticasone propionate (FLONASE) 50 mcg/actuation nasal spray SHAKE LIQUID AND USE 2 SPRAYS(100 MCG) IN EACH NOSTRIL EVERY DAY 48 g 3    gabapentin (NEURONTIN) 300 MG capsule Take 1 capsule (300 mg total) by mouth 3 (three) times daily. 90 capsule 11    levothyroxine (SYNTHROID) 50 MCG tablet Take 50 mcg by mouth.      LIDOcaine (LIDODERM) 5 % Place 1 patch onto the skin every 12 (twelve) hours. Remove & Discard patch within 12 hours or as directed by MD 30 patch 0    loratadine (CLARITIN) 10 mg tablet TAKE 1 TABLET BY MOUTH ONCE DAILY 90 tablet 2    montelukast (SINGULAIR) 10 mg tablet TAKE 1 TABLET(10 MG) BY MOUTH EVERY EVENING 90 tablet 2    naproxen (NAPROSYN) 500 MG tablet TAKE 1 TABLET(500 MG) BY MOUTH TWICE DAILY WITH MEALS 60 tablet 5    pantoprazole (PROTONIX) 20 MG tablet TAKE 1 TABLET(20 MG) BY MOUTH TWICE DAILY BEFORE MEALS 180 tablet 3    ergocalciferol (ERGOCALCIFEROL) 50,000 unit Cap Take 1 capsule (50,000 Units total) by mouth every 7 days. 12 capsule 3    famotidine (PEPCID) 40 MG tablet Take 1 tablet (40 mg total) by mouth once daily. 90 tablet 3    LIDOcaine (LIDODERM) 5 % Place 1 patch onto the skin once daily. Remove & Discard patch within 12 hours or as directed by MD 30 patch 3     No current  "facility-administered medications for this visit.        Family History:  No family history on file.    Social History:  Social History     Tobacco Use    Smoking status: Never    Smokeless tobacco: Never       Review of Systems:  Pertinent positives and negatives listed in HPI. All other systems are reviewed and are negative.    Health Maintaince :   Health Maintenance Topics with due status: Not Due       Topic Last Completion Date    Colorectal Cancer Screening 01/26/2021    Cervical Cancer Screening 04/20/2021    Mammogram 12/19/2023    Hemoglobin A1c (Prediabetes) 04/30/2024    Lipid Panel 04/30/2024           Eye: UTD  Dental: UTD    Immunizations:   Tdap: n/a.  Influenza: needs.  Pneumovax: n/a  Shingrex x 2: needs  COVID: needs  Hepatitis C:   Cancer Screening:  PAP: UTD 4/2021 good x 5 years  Mammogram: UTD 12/2023  Colonoscopy: 1/2021 - needs 1/2026  DEXA:  n/a  LDCT: n/a    The 10-year ASCVD risk score (Joe AGUILAR, et al., 2019) is: 2.4%    Values used to calculate the score:      Age: 53 years      Sex: Female      Is Non- : Yes      Diabetic: No      Tobacco smoker: No      Systolic Blood Pressure: 134 mmHg      Is BP treated: No      HDL Cholesterol: 67 mg/dL      Total Cholesterol: 218 mg/dL      Objective:   /82   Pulse 89   Resp 14   Ht 5' 8" (1.727 m)   Wt 112.2 kg (247 lb 5.7 oz)   LMP 04/13/2024   SpO2 98%   BMI 37.61 kg/m²      Body mass index is 37.61 kg/m².       Physical Examination:  General: Alert and awake in no apparent distress  HEENT: Normocephalic and atraumatic; Tms WNL  Eyes:  PERRL; EOMi with anicteric sclera and clear conjunctivae  Mouth:  Oropharynx clear and without exudate; moist mucous membranes; tonsils +3  Neck:   Cervical nodes not enlarged; supple; no bruits  Cardio:  Regular rate and rhythm with normal S1 and S2; no murmurs or rubs  Resp:  CTAB; respirations unlabored; no wheezes, crackles or rhonchi  Abdom: Soft, NTND with normoactive " bowel sounds; negative HSM  Extrem: Warm and well-perfused with no clubbing, cyanosis or edema; bilateral swollen knees Left >right; tender MCL and patella femoral ligament   Skin:  No rashes, lesions, or color changes  Pulses:  2+ and symmetric distally  Neuro:  AAOx3; cooperative and pleasant with no focal deficits    Laboratory:      Most Recent Data:  Lab Results   Component Value Date    WBC 4.37 04/30/2024    HGB 10.3 (L) 04/30/2024    HCT 34.9 (L) 04/30/2024     04/30/2024    CHOL 218 (H) 04/30/2024    TRIG 81 04/30/2024    HDL 67 04/30/2024    ALT 17 04/30/2024    AST 17 04/30/2024     04/30/2024    K 3.8 04/30/2024     04/30/2024    BUN 8 04/30/2024    CO2 24 04/30/2024    TSH 4.644 (H) 04/30/2024    INR 0.9 12/16/2022    HGBA1C 5.9 (H) 04/30/2024              CBC:   WBC   Date Value Ref Range Status   04/30/2024 4.37 3.90 - 12.70 K/uL Final     Hemoglobin   Date Value Ref Range Status   04/30/2024 10.3 (L) 12.0 - 16.0 g/dL Final     Hematocrit   Date Value Ref Range Status   04/30/2024 34.9 (L) 37.0 - 48.5 % Final     Platelets   Date Value Ref Range Status   04/30/2024 398 150 - 450 K/uL Final     MCV   Date Value Ref Range Status   04/30/2024 75 (L) 82 - 98 fL Final     RDW   Date Value Ref Range Status   04/30/2024 17.5 (H) 11.5 - 14.5 % Final     BMP:   Sodium   Date Value Ref Range Status   04/30/2024 136 136 - 145 mmol/L Final     Potassium   Date Value Ref Range Status   04/30/2024 3.8 3.5 - 5.1 mmol/L Final     Chloride   Date Value Ref Range Status   04/30/2024 100 95 - 110 mmol/L Final     CO2   Date Value Ref Range Status   04/30/2024 24 23 - 29 mmol/L Final     BUN   Date Value Ref Range Status   04/30/2024 8 6 - 20 mg/dL Final     Creatinine   Date Value Ref Range Status   04/30/2024 0.8 0.5 - 1.4 mg/dL Final     Glucose   Date Value Ref Range Status   04/30/2024 96 70 - 110 mg/dL Final     Calcium   Date Value Ref Range Status   04/30/2024 9.6 8.7 - 10.5 mg/dL Final      LFTs:   Total Protein   Date Value Ref Range Status   04/30/2024 8.8 (H) 6.0 - 8.4 g/dL Final     Albumin   Date Value Ref Range Status   04/30/2024 3.8 3.5 - 5.2 g/dL Final     Total Bilirubin   Date Value Ref Range Status   04/30/2024 0.7 0.1 - 1.0 mg/dL Final     Comment:     For infants and newborns, interpretation of results should be based  on gestational age, weight and in agreement with clinical  observations.    Premature Infant recommended reference ranges:  Up to 24 hours.............<8.0 mg/dL  Up to 48 hours............<12.0 mg/dL  3-5 days..................<15.0 mg/dL  6-29 days.................<15.0 mg/dL       AST   Date Value Ref Range Status   04/30/2024 17 10 - 40 U/L Final     Alkaline Phosphatase   Date Value Ref Range Status   04/30/2024 51 (L) 55 - 135 U/L Final     ALT   Date Value Ref Range Status   04/30/2024 17 10 - 44 U/L Final     Coags:   INR   Date Value Ref Range Status   12/16/2022 0.9 0.8 - 1.2 Final     FLP:      Lab Results   Component Value Date    CHOL 218 (H) 04/30/2024    CHOL 222 (H) 08/08/2023    CHOL 192 10/27/2022     Lab Results   Component Value Date    HDL 67 04/30/2024    HDL 58 08/08/2023    HDL 60 10/27/2022     Lab Results   Component Value Date    LDLCALC 134.8 04/30/2024    LDLCALC 147.2 08/08/2023    LDLCALC 119.0 10/27/2022     Lab Results   Component Value Date    TRIG 81 04/30/2024    TRIG 84 08/08/2023    TRIG 65 10/27/2022     Lab Results   Component Value Date    CHOLHDL 30.7 04/30/2024    CHOLHDL 26.1 08/08/2023    CHOLHDL 31.3 10/27/2022      DM:      Hemoglobin A1C   Date Value Ref Range Status   04/30/2024 5.9 (H) 4.0 - 5.6 % Final     Comment:     ADA Screening Guidelines:  5.7-6.4%  Consistent with prediabetes  >or=6.5%  Consistent with diabetes    High levels of fetal hemoglobin interfere with the HbA1C  assay. Heterozygous hemoglobin variants (HbS, HgC, etc)do  not significantly interfere with this assay.   However, presence of multiple variants  "may affect accuracy.     08/08/2023 5.8 (H) 4.0 - 5.6 % Final     Comment:     ADA Screening Guidelines:  5.7-6.4%  Consistent with prediabetes  >or=6.5%  Consistent with diabetes    High levels of fetal hemoglobin interfere with the HbA1C  assay. Heterozygous hemoglobin variants (HbS, HgC, etc)do  not significantly interfere with this assay.   However, presence of multiple variants may affect accuracy.     10/27/2022 5.7 (H) 4.0 - 5.6 % Final     Comment:     ADA Screening Guidelines:  5.7-6.4%  Consistent with prediabetes  >or=6.5%  Consistent with diabetes    High levels of fetal hemoglobin interfere with the HbA1C  assay. Heterozygous hemoglobin variants (HbS, HgC, etc)do  not significantly interfere with this assay.   However, presence of multiple variants may affect accuracy.       LDL Cholesterol   Date Value Ref Range Status   04/30/2024 134.8 63.0 - 159.0 mg/dL Final     Comment:     The National Cholesterol Education Program (NCEP) has set the  following guidelines (reference values) for LDL Cholesterol:  Optimal.......................<130 mg/dL  Borderline High...............130-159 mg/dL  High..........................160-189 mg/dL  Very High.....................>190 mg/dL       Creatinine   Date Value Ref Range Status   04/30/2024 0.8 0.5 - 1.4 mg/dL Final     Thyroid:   TSH   Date Value Ref Range Status   04/30/2024 4.644 (H) 0.400 - 4.000 uIU/mL Final     Free T4   Date Value Ref Range Status   04/30/2024 0.90 0.71 - 1.51 ng/dL Final     T3, Total   Date Value Ref Range Status   04/30/2024 118 60 - 180 ng/dL Final     Anemia: No results found for: "IRON", "TIBC", "FERRITIN", "VCEXTEQL14", "FOLATE"  Cardiac: No results found for: "TROPONINI", "CKTOTAL", "CKMB", "BNP"  Urinalysis:   Color, UA   Date Value Ref Range Status   04/30/2024 Yellow Yellow, Straw, Maura Final     Specific Gravity, UA   Date Value Ref Range Status   04/30/2024 1.020 1.005 - 1.030 Final     Nitrite, UA   Date Value Ref Range " Status   04/30/2024 Negative Negative Final     Ketones, UA   Date Value Ref Range Status   04/30/2024 Negative Negative Final       Other Results:  EKG (my interpretation):     Radiology:  US Thyroid  Narrative: EXAMINATION:  US THYROID    CLINICAL HISTORY:  Nontoxic multinodular goiter    TECHNIQUE:  Ultrasound of the thyroid and cervical lymph nodes was performed.    COMPARISON:  11/11/2022    FINDINGS:  Right lobe measures 5 x 2 x 2 cm.  Left lobe measures 3.6 x 1.2 x 1.3 cm.    Multiple bilateral thyroid nodules are present, with 4 index nodules as detailed below.    Nodule #1    Size: 4.1 cm    Location: Right lobe lower pole    Composition: mixed cystic and solid (1)    Echogenicity: hypoechoic (2)    Shape: wider-than-tall (0)    Margins: smooth (0)    Echogenic foci: macrocalcifications (1)    Change: Previously 4.4 cm by CT    TI-RADS category: TR4 (4 points) - follow up is recommended at 1, 2, 3, and 5 years if 1 cm or greater; FNA is recommended if 1.5 cm or greater.  Impression: It is my impression comparing today's exam with the CT that there is 1 dominant nodule in the lower pole on the right.  Appearance is unchanged compared to prior CT.  According to the electronic medical record, nodule has undergone interval FNA.  No new nodules.    Electronically signed by: Bina Myers  Date:    04/20/2023  Time:    14:35          Assessment/Plan     Serena Yang is a 53 y.o. female with:  1. Witnessed episode of apnea  Assessment & Plan:  Refer to Sleep clinic     Orders:  -     Ambulatory referral/consult to Sleep Disorders; Future; Expected date: 04/30/2024    2. Patellofemoral syndrome, bilateral  Overview:  Saw Dr. Angela for same issue and had MRI of right knee and dx'ed with patellofemoral syndrome and had PT with improvement.    Assessment & Plan:  Cont Aspercreme, biofreeze, lidoderm 5%patches  voltaren gel 1% qid   Refer for PT   Get appropriate foot wear   Avoid treadmill - prefer recumbent bike  or aquatherapy  Lose weight     Orders:  -     Ambulatory referral/consult to Physical/Occupational Therapy; Future; Expected date: 04/30/2024  -     LIDOcaine (LIDODERM) 5 %; Place 1 patch onto the skin once daily. Remove & Discard patch within 12 hours or as directed by MD  Dispense: 30 patch; Refill: 3    3. Chronic rhinosinusitis  Overview:  Right mucous retnetion cyst     Assessment & Plan:   Start ocean nasal spray prior to flonase; singulair 10mg qhs; claritin 10mg daily   F/uDr. Marline      4. Dysmenorrhea  Assessment & Plan:  Naproxen 500mg only if severe pain       5. Fibroids  Assessment & Plan:  F/u Dr. Kendall for bimannual exam       6. Class 2 obesity due to excess calories with body mass index (BMI) of 37.0 to 37.9 in adult, unspecified whether serious comorbidity present  Assessment & Plan:  Exercise 30 min 5 times per week, decrease portion sizes by 50%; encourage plant-based diet;  drink 6-8 glasses of water daily, avoid fast foods, creamy, rich foods jasmin ice cream, cheese creamy salad dressings;avoid eating 3 hours prior to bedtime; consider 8-10 hour intermittent diet; avoid simple sugars jasmin white bread, rice, potatoes, noodles/pasta; No sweetened drinks.       7. Vitamin D deficiency  Assessment & Plan:  Check Vit D level on Vit D 50,000 IU 3 times per month weekly - refill today     Orders:  -     Vitamin D; Future; Expected date: 04/30/2024  -     ergocalciferol (ERGOCALCIFEROL) 50,000 unit Cap; Take 1 capsule (50,000 Units total) by mouth every 7 days.  Dispense: 12 capsule; Refill: 3    8. Preventative health care  Overview:  7/18/2023 with dx Benign follicular nodule c/w with follicular adenoma by Dr. Mary    Assessment & Plan:  Flu today  Get COVID and Shingrex at pharmacy  Labs today     Exercise 30 min 5 times per week, decrease portion sizes by 50%; encourage plant-based diet;  drink 6-8 glasses of water daily, avoid fast foods, creamy, rich foods jasmin ice cream, cheese creamy salad  dressings;avoid eating 3 hours prior to bedtime; consider 8-10 hour intermittent diet; avoid simple sugars jasmin white bread, rice, potatoes, noodles/pasta; No sweetened drinks.     Sleep Study referral     Start Pepcid 40mg daily and PPI prn   Initiate GERD precautions ie lose weight, avoid eating 3 hours prior to bed, minimize caffeine, alcohol, tobacco, spicy, greasy, fatty foods; avoid peppermint chocolates, citrus and other acidic foods. Increase exercise to help with digestion and avoid lying down immediately after eating.  Elevate head of bed if GERD awakens pt from sleep.  Eat 6 small snacks rather than 3 large meals.      Make appt with gyn again     Orders:  -     Lipid Panel; Future; Expected date: 04/30/2024  -     Hemoglobin A1C; Future; Expected date: 04/30/2024  -     Urinalysis; Future; Expected date: 04/30/2024  -     Comprehensive Metabolic Panel; Future; Expected date: 04/30/2024  -     CBC Auto Differential; Future; Expected date: 04/30/2024    9. Daytime hypersomnolence  Assessment & Plan:  Order Sleep study    Orders:  -     Cancel: Ambulatory referral/consult to Sleep Disorders; Future; Expected date: 05/07/2024    10. Gastroesophageal reflux disease without esophagitis  Assessment & Plan:  Start Pepcid 40mg daily and PPI prn   Initiate GERD precautions ie lose weight, avoid eating 3 hours prior to bed, minimize caffeine, alcohol, tobacco, spicy, greasy, fatty foods; avoid peppermint chocolates, citrus and other acidic foods. Increase exercise to help with digestion and avoid lying down immediately after eating.  Elevate head of bed if GERD awakens pt from sleep.  Eat 6 small snacks rather than 3 large meals.      Orders:  -     famotidine (PEPCID) 40 MG tablet; Take 1 tablet (40 mg total) by mouth once daily.  Dispense: 90 tablet; Refill: 3    11. Neuropathy  Overview:  Post-op complication after right thyroidectomy on 7/18/2023  Right arm/hand     Assessment & Plan:  Cont gabapentin 300mg bid        12. H/O partial thyroidectomy  Overview:  S/p right thyroidectomy 7/18/2023 with dx Benign follicular nodule c/w with follicular adenoma by Dr. Mary    Orders:  -     TSH; Future; Expected date: 04/30/2024  -     T4, FREE; Future; Expected date: 04/30/2024  -     T3; Future; Expected date: 04/30/2024    13. Slow transit constipation  Assessment & Plan:  Fiber gummies 2-3 per day   For Acute Constipation:  -Take Fiber gummies 2 daily   -Start Mag citrate 8oz bottle with Sennakot plus up to twice per day for severe constipation  Miralax 17gm bid in 8oz prune juice    Maintenance:   --hydrate well: drink 8-10 glasses of water/day  Start daily fiber (25 gm per day ideally).  Take fiber gummies 2 per day  -Miralax 17gm in 8oz liquid daily  - Take 1 tsp of fiber powder (psyllium or other sugar-free powder).  Mix in 8 oz of water.  Take x 3-5 days.  Then, increase fiber by 1 tsp every 3-5 days until stool is easy to pass.  Stop and continue at that dose.   Do not exceed 6 tsps/day.  May also use over the counter stool softener 1-2 x/day      AVOID laxatives.  --controlling constipation can keep hemorrhoids from flaring    Foods high in fiber:  Fruit: jasmin raspberries, pear, apple with skin  Veggies: jasmin boiled green peas, broccoli, brussel sprouts, turnip greens, baked potatoes with skin  Grains: whole wheat spaghetti, cooked pearled barley, bran flakes, cooked quinoa, instant oatmeal, oat bran muffin  Nuts/Legumes: boiled split peas, boiled lentils, boiled black beans, backed canned beans, jan seeds    Controlling constipation may help bladder urgency/leakage and fiber may better control cholesterol and blood glucose.        14. Hot flashes  -     FOLLICLE STIMULATING HORMONE; Future; Expected date: 04/30/2024    15. Mass of left parotid gland  Assessment & Plan:  Still needs FNA - f/u Dr. Mary soon     Orders:  -     T4, FREE; Future; Expected date: 04/30/2024  -     Ambulatory referral/consult to Sleep  Disorders; Future; Expected date: 04/30/2024             I spent a total of 55 minutes on the day of the visit.This includes face to face time and non-face to face time preparing to see the patient (eg, review of tests), obtaining and/or reviewing separately obtained history, documenting clinical information in the electronic or other health record, independently interpreting results and communicating results to the patient/family/caregiver, or care coordinator.   Code Status:     Janelle Zamora MD

## 2024-04-30 NOTE — PATIENT INSTRUCTIONS
Exercise 30 min 5 times per week, decrease portion sizes by 50%; encourage plant-based diet;  drink 6-8 glasses of water daily, avoid fast foods, creamy, rich foods jasmin ice cream, cheese creamy salad dressings;avoid eating 3 hours prior to bedtime; consider 8-10 hour intermittent diet; avoid simple sugars jasmin white bread, rice, potatoes, noodles/pasta; No sweetened drinks.     Sleep Study referral     Start Pepcid 40mg daily and PPI prn   Initiate GERD precautions ie lose weight, avoid eating 3 hours prior to bed, minimize caffeine, alcohol, tobacco, spicy, greasy, fatty foods; avoid peppermint chocolates, citrus and other acidic foods. Increase exercise to help with digestion and avoid lying down immediately after eating.  Elevate head of bed if GERD awakens pt from sleep.  Eat 6 small snacks rather than 3 large meals.      Make appt with gyn again     Foods high in fiber:  Fruit: jasmin raspberries, pear, apple with skin  Veggies: jasmin boiled green peas, broccoli, brussel sprouts, turnip greens, baked potatoes with skin  Grains: whole wheat spaghetti, cooked pearled barley, bran flakes, cooked quinoa, instant oatmeal, oat bran muffin  Nuts/Legumes: boiled split peas, boiled lentils, boiled black beans, backed canned beans, jan seeds    Use voltaren gel 1% 4 times per day and refill Lidoderm 5% patches daily     Refer to PT

## 2024-05-01 ENCOUNTER — TELEPHONE (OUTPATIENT)
Dept: PRIMARY CARE CLINIC | Facility: CLINIC | Age: 54
End: 2024-05-01
Payer: COMMERCIAL

## 2024-05-01 ENCOUNTER — PATIENT MESSAGE (OUTPATIENT)
Dept: PRIMARY CARE CLINIC | Facility: CLINIC | Age: 54
End: 2024-05-01
Payer: COMMERCIAL

## 2024-05-01 DIAGNOSIS — R73.03 PREDIABETES: Primary | ICD-10-CM

## 2024-05-01 DIAGNOSIS — D50.0 IRON DEFICIENCY ANEMIA DUE TO CHRONIC BLOOD LOSS: ICD-10-CM

## 2024-05-01 DIAGNOSIS — E03.9 HYPOTHYROIDISM, UNSPECIFIED TYPE: ICD-10-CM

## 2024-05-01 LAB — FERRITIN SERPL-MCNC: 10 NG/ML (ref 20–300)

## 2024-05-01 RX ORDER — METFORMIN HYDROCHLORIDE 500 MG/1
500 TABLET ORAL 2 TIMES DAILY WITH MEALS
Qty: 180 TABLET | Refills: 3 | Status: SHIPPED | OUTPATIENT
Start: 2024-05-01 | End: 2025-05-01

## 2024-05-01 RX ORDER — LEVOTHYROXINE SODIUM 88 UG/1
88 TABLET ORAL
Qty: 30 TABLET | Refills: 11 | Status: SHIPPED | OUTPATIENT
Start: 2024-05-01 | End: 2025-05-01

## 2024-05-01 NOTE — ASSESSMENT & PLAN NOTE
Start ocean nasal spray prior to flonase; singulair 10mg qhs; claritin 10mg daily   F/uDr. Marline  
Check Vit D level on Vit D 50,000 IU 3 times per month weekly - refill today   
Cont Aspercreme, biofreeze, lidoderm 5%patches  voltaren gel 1% qid   Refer for PT   Get appropriate foot wear   Avoid treadmill - prefer recumbent bike or aquatherapy  Lose weight   
Cont gabapentin 300mg bid   
Exercise 30 min 5 times per week, decrease portion sizes by 50%; encourage plant-based diet;  drink 6-8 glasses of water daily, avoid fast foods, creamy, rich foods jasmin ice cream, cheese creamy salad dressings;avoid eating 3 hours prior to bedtime; consider 8-10 hour intermittent diet; avoid simple sugars jasmin white bread, rice, potatoes, noodles/pasta; No sweetened drinks.   
F/u Dr. Kendall for bimannual exam   
Fiber gummies 2-3 per day   For Acute Constipation:  -Take Fiber gummies 2 daily   -Start Mag citrate 8oz bottle with Sennakot plus up to twice per day for severe constipation  Miralax 17gm bid in 8oz prune juice    Maintenance:   --hydrate well: drink 8-10 glasses of water/day  Start daily fiber (25 gm per day ideally).  Take fiber gummies 2 per day  -Miralax 17gm in 8oz liquid daily  - Take 1 tsp of fiber powder (psyllium or other sugar-free powder).  Mix in 8 oz of water.  Take x 3-5 days.  Then, increase fiber by 1 tsp every 3-5 days until stool is easy to pass.  Stop and continue at that dose.   Do not exceed 6 tsps/day.  May also use over the counter stool softener 1-2 x/day      AVOID laxatives.  --controlling constipation can keep hemorrhoids from flaring    Foods high in fiber:  Fruit: jasmin raspberries, pear, apple with skin  Veggies: jasmin boiled green peas, broccoli, brussel sprouts, turnip greens, baked potatoes with skin  Grains: whole wheat spaghetti, cooked pearled barley, bran flakes, cooked quinoa, instant oatmeal, oat bran muffin  Nuts/Legumes: boiled split peas, boiled lentils, boiled black beans, backed canned beans, jan seeds    Controlling constipation may help bladder urgency/leakage and fiber may better control cholesterol and blood glucose.    
Flu today  Get COVID and Shingrex at pharmacy  Labs today     Exercise 30 min 5 times per week, decrease portion sizes by 50%; encourage plant-based diet;  drink 6-8 glasses of water daily, avoid fast foods, creamy, rich foods jasmin ice cream, cheese creamy salad dressings;avoid eating 3 hours prior to bedtime; consider 8-10 hour intermittent diet; avoid simple sugars jasmin white bread, rice, potatoes, noodles/pasta; No sweetened drinks.     Sleep Study referral     Start Pepcid 40mg daily and PPI prn   Initiate GERD precautions ie lose weight, avoid eating 3 hours prior to bed, minimize caffeine, alcohol, tobacco, spicy, greasy, fatty foods; avoid peppermint chocolates, citrus and other acidic foods. Increase exercise to help with digestion and avoid lying down immediately after eating.  Elevate head of bed if GERD awakens pt from sleep.  Eat 6 small snacks rather than 3 large meals.      Make appt with gyn again   
Naproxen 500mg only if severe pain   
Needs Sleep Study   
Order Sleep study  
Refer to Sleep clinic   
Start Pepcid 40mg daily and PPI prn   Initiate GERD precautions ie lose weight, avoid eating 3 hours prior to bed, minimize caffeine, alcohol, tobacco, spicy, greasy, fatty foods; avoid peppermint chocolates, citrus and other acidic foods. Increase exercise to help with digestion and avoid lying down immediately after eating.  Elevate head of bed if GERD awakens pt from sleep.  Eat 6 small snacks rather than 3 large meals.    
Still needs FNA - f/u Dr. Mary soon   
PAST SURGICAL HISTORY:  Presence of cardioverter defibrillator Medtronic PPM/ICD placed 9/2018

## 2024-05-07 ENCOUNTER — CLINICAL SUPPORT (OUTPATIENT)
Dept: REHABILITATION | Facility: OTHER | Age: 54
End: 2024-05-07
Payer: COMMERCIAL

## 2024-05-07 DIAGNOSIS — M25.661 DECREASED RANGE OF MOTION (ROM) OF BOTH KNEES: ICD-10-CM

## 2024-05-07 DIAGNOSIS — M22.2X2 PATELLOFEMORAL SYNDROME, BILATERAL: ICD-10-CM

## 2024-05-07 DIAGNOSIS — M25.662 DECREASED RANGE OF MOTION (ROM) OF BOTH KNEES: ICD-10-CM

## 2024-05-07 DIAGNOSIS — M22.2X1 PATELLOFEMORAL SYNDROME, BILATERAL: ICD-10-CM

## 2024-05-07 DIAGNOSIS — G89.29 CHRONIC PAIN OF BOTH KNEES: Primary | ICD-10-CM

## 2024-05-07 DIAGNOSIS — M25.561 CHRONIC PAIN OF BOTH KNEES: Primary | ICD-10-CM

## 2024-05-07 DIAGNOSIS — M25.562 CHRONIC PAIN OF BOTH KNEES: Primary | ICD-10-CM

## 2024-05-07 PROCEDURE — 97530 THERAPEUTIC ACTIVITIES: CPT | Mod: PN

## 2024-05-07 PROCEDURE — 97161 PT EVAL LOW COMPLEX 20 MIN: CPT | Mod: PN

## 2024-05-07 NOTE — PROGRESS NOTES
[facility]  Physical Therapy Initial Evaluation    Name: Serena Yang  [facility] Number: [id number]    Therapy Diagnosis:   Encounter Diagnoses   Name Primary?    Patellofemoral syndrome, bilateral     Chronic pain of both knees Yes    Decreased range of motion (ROM) of both knees      Physician: Janelle Zamora MD    Physician Orders: Physical Therapy Evaluate and Treat   Medical Diagnosis from Referral: Patellofemoral syndrome, bilateral (M22.2X1,M22.2X2)  Evaluation Date: 2024  Authorization Period Expiration: 2025  Plan of Care Expiration: 2024  Visit # / Visits authorized:   Re-assessment: due 2024  Focus On therapeutic Outcomes (FOTO): 2024 ()     Time In: 5:37 pm  Time Out: 6:20 pm  Total Billable Time: 10 minutes    Precautions: Standard    Subjective   Date of onset: 5 weeks ago  History of current condition - Serena reports: knee pain, stiffness and swelling for the last 5 or so weeks. Reports clicking in the knee cap and spasms. Difficulty with range of motion. Denies any particular onset but she did wear heels for a  and her foot may have gone one way and the knee another while at the grave site. Both knees involved equally. Endorses some pain in the right knee about 5 years ago but was a different pain       Burning, tingling, numbness: burning and tingling in medial aspect of both knees  Falls in the last 12 months: no  Popping, clicking, locking, feeling of instability at the knee: popping and clicking with occasional pain noted    Patient denies dizziness, headaches, blurry vision, nausea, vomiting, impaired sensations in groin and saddle region and changes in bowel/bladder.     Medical History:   Past Medical History:   Diagnosis Date    Chronic bilateral low back pain without sciatica 10/18/2022    Hot flashes 2023    Nasal septal deviation 10/18/2022    Onychomycosis of great toe 10/18/2022    Snoring 2024     Enlarged tonsils         Surgical History:   Serena Yang  has no past surgical history on file.    Medications:   Serena has a current medication list which includes the following prescription(s): cyclobenzaprine, ergocalciferol, famotidine, ferrous sulfate, fluticasone propionate, gabapentin, levothyroxine, lidocaine, lidocaine, loratadine, metformin, montelukast, naproxen, and pantoprazole.    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  Prior Level of Function: Independent with Activities of daily living, minimal knee pain   Current Level of Function: difficulty performing house chores, cooking, shopping, walking, increased positional changes and decreased knee range of motion, don/doffing shoes    Pain:  Current 5/10, worst 9/10, best 4/10   Location: bilateral knees   Description: Aching, Burning, Throbbing, Tingling, and stiffness; constant  Aggravating Factors: Standing, Walking, and pushing/pulling things  Easing Factors: heating pad    Patients goals: to get my knees back, extend and walk better    Objective     Observation:   Antalgic gait   Alert and oriented, good historian    Palpation:  Tender to palpation over medial joint line, patella and patellar tendon on left > right     Flexibility:   Global hip tightness bilaterally      Range of Motion:    Knee Right active/ passive range of motion  Left active/ passive range of motion    Flexion 111/120 82/125   Extension -2 -5       Manual Muscle test/Strength: P! = pain    Hip Right Left   Flexion (limited hip flexion active range of motion) 4-/5 4-/5   Extension 3-/5 3-/5   Internal Rotation 3+/5 3+/5 pain   External Rotation 4/5 4/5   Adduction 3+/5 2/5   Abduction 3+/5  3+/5   Knee     Flexion 5/5 4/5   Extension 4+/5 4/5   Ankle     Dorsiflexion 5/5 4/5 pain   Plantarflexion 3+/5 3-/5 pain    Inversion 4/5 3+/5 pain   Eversion 5/5 3+/5 pain     Special Tests:   Left Right   Valgus Stress Test (+) (-)   Varus Stress test (+) (-)   Apley's Compression (+) (-)   Apley's Distraction (+) (-)   Paige's compression test (-) (-)   Thessaly's Test (+) toward left  (-)     5x sit<>stand: 30 seconds with audible crepitus; increased left knee pain    Normal:   60-69: 11.4 seconds  70-79: 12.6 seconds  80-89: 12.7 seconds    30 seconds sit<>stand: 5 reps, with audible crepitus; increased left knee pain    Normal:   Age Male Female   60-64 17 15   65-69 16 15   70-74 15 14   75-79 14 13   80-84 13 12   85-89 11 11   90-94 9 9        Knee injury and Osteoarthritis Outcome Score for Joint Replacement (KOOS Jr.): 47.5 (lower score = greater disability)    CMS Impairment/Limitation/Restriction for Focus On therapeutic Outcomes (FOTO) Knee Survey    Therapist reviewed Focus On therapeutic Outcomes (FOTO) scores for Serena Yang on 5/7/2024.   Focus On therapeutic Outcomes (FOTO) documents entered into [employer] - see Media section.    Intake Score: 36%       TREATMENT   Treatment Time In: 6:10 pm  Treatment Time Out: 6:20 pm  Total Treatment time separate from Evaluation: 10 minutes    Serena participated in dynamic functional therapeutic activities to improve functional performance for 10  minutes, including:  Home exercise program review, performance and education  [x] Heel raises x5  [x] Quad set x5  [x] Quad set into straight leg raise x2  [x] Sidelying hip abduction x2  [x] Short arc quadriceps x2  [x] Hamstring stretch x15 seconds  [x] Heel slide x2    Home Exercises and Patient Education Provided    Education provided:   Patient was educated on initial evaluation findings and expectations as well as future PT services, procedures, and expectations for optimal compliance with therapy.      Written Home Exercises Provided: Yes, see patient instructions.    Assessment   Serena is a 53 y.o. female referred to outpatient Physical Therapy with a medical diagnosis of Patellofemoral syndrome, bilateral. Patient presents with decreased range of motion, strength, flexibility, positive special tests, tender to palpation with palpation over patellar tendons and increased pain and decreased participation with recreational and household duties.     Patient prognosis is Good.   Patient will benefit from skilled outpatient Physical Therapy to address the deficits stated above and in the chart below, provide patient/family education, and to maximize patient's level of independence to return to prior level of function.     Plan of care discussed with patient: Yes  Patient's spiritual, cultural and educational needs considered and patient is agreeable to the plan of care and goals as stated below:     Anticipated Barriers for therapy: none    Medical Necessity is demonstrated by the following  History  Co-morbidities and personal factors that may impact the plan of care [x] LOW: no personal factors / co-morbidities  [] MODERATE: 1-2 personal factors / co-morbidities  [] HIGH: 3+ personal factors / co-morbidities    Moderate / High Support Documentation:   Co-morbidities affecting plan of care: history of chronic low back pain, prior knee injuries, high BMI     Personal Factors:   no deficits     Examination  Body Structures and Functions, activity limitations and participation restrictions that may impact the plan of care [x] LOW: addressing 1-2 elements  [] MODERATE: 3+ elements  [] HIGH: 4+ elements (please support below)    Moderate / High Support Documentation: range of motion, strength, gait      Clinical Presentation [x] LOW: stable  [] MODERATE: Evolving  [] HIGH: Unstable     Decision Making/ Complexity Score: low       Goals:    Short term goals: In 4 weeks,  Goal Status   Patient will be independent  with home exercise program to promote improved therapy outcomes.     2. Patient will increase *** Manual Muscle test to ***/5 to improve ambulation and stair navigation.    3. Patient will increase *** single leg stance to 30 seconds to decrease fall risk.         Long term goals: In 8 weeks,  Goal Status   4. Patient will be independent with progressed home exercise program to allow for self management of symptoms.     5. Patient will increase *** Manual Muscle test to ***/5 for improved ambulation and stair navigation.     6. Patient will report stair navigation with <2/10 pain to ***.     7. Patient will improve Knee injury and Osteoarthritis Outcome Score for Joint Replacement (KOOS Jr.) from *** to *** to demonstrate improved functional mobility and quality of life.     8. Patient will report walking for 30 minutes with <2/10 pain 5x/week to promote healthy lifestyle and regular physical exercise.       Patient will increase *** range of motion by *** degrees to improve functional mobility.   Patient will increase *** tandem stance to 30 seconds to improve ambulation and decrease fall risk.   Patient will perform a squat with proper form  and <2/10 pain to  objects from floor.  Patient will stand for *** with <2/10 pain to prepare dinner.     Plan   Plan of care Certification: 5/7/2024 to 7/5/2024.    Outpatient Physical Therapy 2 times weekly for 16 visits to include the following interventions: Gait Training, Manual Therapy, Moist Heat/ Ice, Neuromuscular Re-ed, Patient Education, Therapeutic Activities, and Therapeutic Exercise.   Patient will be seen by a physical therapist minimally every 6th visit or every 30 days.    Janet Gold, PT

## 2024-05-07 NOTE — PATIENT INSTRUCTIONS
Access Code: [email or web address]  URL: [email or web address]  Date: 05/07/2024  Prepared by: Janet Gold    Exercises  - Heel Raise  - 1 x daily - 3 sets - 10 reps  - Long Sitting Quad Set  - 1 x daily - 1 sets - 20 reps - 5 second hold  - Active Straight Leg Raise with Quad Set  - 1 x daily - 2 sets - 10 reps  - Sidelying Hip Abduction  - 1 x daily - 2 sets - 10 reps  - Supine Knee Extension Strengthening  - 1 x daily - 3-5 x weekly - 2 sets - 10 reps - 3 hold  - Seated Hamstring Stretch  - 1 sets - 3 reps - 30 seconds hold  - Supine Heel Slide with Strap  - 1 x daily - 3-5 x weekly - 2 sets - 10 reps - 5 seconds hold

## 2024-05-08 NOTE — PLAN OF CARE
[facility]  Physical Therapy Initial Evaluation    Name: Serena Yang  [facility] Number: [id number]    Therapy Diagnosis:   Encounter Diagnoses   Name Primary?    Patellofemoral syndrome, bilateral     Chronic pain of both knees Yes    Decreased range of motion (ROM) of both knees      Physician: Janelle Zamora MD    Physician Orders: Physical Therapy Evaluate and Treat   Medical Diagnosis from Referral: Patellofemoral syndrome, bilateral (M22.2X1,M22.2X2)  Evaluation Date: 2024  Authorization Period Expiration: 2025  Plan of Care Expiration: 2024  Visit # / Visits authorized:   Re-assessment: due 2024  Focus On therapeutic Outcomes (FOTO): 2024 ()     Time In: 5:37 pm  Time Out: 6:20 pm  Total Billable Time: 10 minutes    Precautions: Standard    Subjective   Date of onset: 5 weeks ago  History of current condition - Serena reports: knee pain, stiffness and swelling for the last 5 or so weeks. Reports clicking in the knee cap and spasms. Difficulty with range of motion. Denies any particular onset but she did wear heels for a  and her foot may have gone one way and the knee another while at the grave site. Both knees involved equally. Endorses some pain in the right knee about 5 years ago but was a different pain       Burning, tingling, numbness: burning and tingling in medial aspect of both knees  Falls in the last 12 months: no  Popping, clicking, locking, feeling of instability at the knee: popping and clicking with occasional pain noted    Patient denies dizziness, headaches, blurry vision, nausea, vomiting, impaired sensations in groin and saddle region and changes in bowel/bladder.     Medical History:   Past Medical History:   Diagnosis Date    Chronic bilateral low back pain without sciatica 10/18/2022    Hot flashes 2023    Nasal septal deviation 10/18/2022    Onychomycosis of great toe 10/18/2022    Snoring 2024     Enlarged tonsils         Surgical History:   Serena Yang  has no past surgical history on file.    Medications:   Serena has a current medication list which includes the following prescription(s): cyclobenzaprine, ergocalciferol, famotidine, ferrous sulfate, fluticasone propionate, gabapentin, levothyroxine, lidocaine, lidocaine, loratadine, metformin, montelukast, naproxen, and pantoprazole.    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  Prior Level of Function: Independent with Activities of daily living, minimal knee pain   Current Level of Function: difficulty performing house chores, cooking, shopping, walking, increased positional changes and decreased knee range of motion, don/doffing shoes    Pain:  Current 5/10, worst 9/10, best 4/10   Location: bilateral knees   Description: Aching, Burning, Throbbing, Tingling, and stiffness; constant  Aggravating Factors: Standing, Walking, and pushing/pulling things  Easing Factors: heating pad    Patients goals: to get my knees back, extend and walk better    Objective     Observation:   Antalgic gait   Alert and oriented, good historian    Palpation:  Tender to palpation over medial joint line, patella and patellar tendon on left > right     Flexibility:   Global hip tightness bilaterally      Range of Motion:    Knee Right active/ passive range of motion  Left active/ passive range of motion    Flexion 111/120 82/125   Extension -2 -5       Manual Muscle test/Strength: P! = pain    Hip Right Left   Flexion (limited hip flexion active range of motion) 4-/5 4-/5   Extension 3-/5 3-/5   Internal Rotation 3+/5 3+/5 pain   External Rotation 4/5 4/5   Adduction 3+/5 2/5   Abduction 3+/5  3+/5   Knee     Flexion 5/5 4/5   Extension 4+/5 4/5   Ankle     Dorsiflexion 5/5 4/5 pain   Plantarflexion 3+/5 3-/5 pain    Inversion 4/5 3+/5 pain   Eversion 5/5 3+/5 pain     Special Tests:   Left Right   Valgus Stress Test (+) (-)   Varus Stress test (+) (-)   Apley's Compression (+) (-)   Apley's Distraction (+) (-)   Paige's compression test (-) (-)   Thessaly's Test (+) toward left  (-)     5x sit<>stand: 30 seconds with audible crepitus; increased left knee pain    Normal:   60-69: 11.4 seconds  70-79: 12.6 seconds  80-89: 12.7 seconds    30 seconds sit<>stand: 5 reps, with audible crepitus; increased left knee pain    Normal:   Age Male Female   60-64 17 15   65-69 16 15   70-74 15 14   75-79 14 13   80-84 13 12   85-89 11 11   90-94 9 9        Knee injury and Osteoarthritis Outcome Score for Joint Replacement (KOOS Jr.): 47.5 (lower score = greater disability)    CMS Impairment/Limitation/Restriction for Focus On therapeutic Outcomes (FOTO) Knee Survey    Therapist reviewed Focus On therapeutic Outcomes (FOTO) scores for Serena Yang on 5/7/2024.   Focus On therapeutic Outcomes (FOTO) documents entered into [employer] - see Media section.    Intake Score: 36%       TREATMENT   Treatment Time In: 6:10 pm  Treatment Time Out: 6:20 pm  Total Treatment time separate from Evaluation: 10 minutes    Serena participated in dynamic functional therapeutic activities to improve functional performance for 10  minutes, including:  Home exercise program review, performance and education  [x] Heel raises x5  [x] Quad set x5  [x] Quad set into straight leg raise x2  [x] Sidelying hip abduction x2  [x] Short arc quadriceps x2  [x] Hamstring stretch x15 seconds  [x] Heel slide x2    Home Exercises and Patient Education Provided    Education provided:   Patient was educated on initial evaluation findings and expectations as well as future PT services, procedures, and expectations for optimal compliance with therapy.      Written Home Exercises Provided: Yes, see patient instructions.    Assessment   Serena is a 53 y.o. female referred to outpatient Physical Therapy with a medical diagnosis of Patellofemoral syndrome, bilateral. Patient presents with decreased range of motion, strength, flexibility, positive special tests, tender to palpation with palpation over patellar tendons and increased pain and decreased participation with recreational and household duties.     Patient prognosis is Good.   Patient will benefit from skilled outpatient Physical Therapy to address the deficits stated above and in the chart below, provide patient/family education, and to maximize patient's level of independence to return to prior level of function.     Plan of care discussed with patient: Yes  Patient's spiritual, cultural and educational needs considered and patient is agreeable to the plan of care and goals as stated below:     Anticipated Barriers for therapy: none    Medical Necessity is demonstrated by the following  History  Co-morbidities and personal factors that may impact the plan of care [x] LOW: no personal factors / co-morbidities  [] MODERATE: 1-2 personal factors / co-morbidities  [] HIGH: 3+ personal factors / co-morbidities    Moderate / High Support Documentation:   Co-morbidities affecting plan of care: history of chronic low back pain, prior knee injuries, high BMI     Personal Factors:   no deficits     Examination  Body Structures and Functions, activity limitations and participation restrictions that may impact the plan of care [x] LOW: addressing 1-2 elements  [] MODERATE: 3+ elements  [] HIGH: 4+ elements (please support below)    Moderate / High Support Documentation: range of motion, strength, gait      Clinical Presentation [x] LOW: stable  [] MODERATE: Evolving  [] HIGH: Unstable     Decision Making/ Complexity Score: low       Goals:    Short term goals: In 4 weeks,  Goal Status   Patient will be independent  with home exercise program to promote improved therapy outcomes.     2. Patient will increase bilateral hip/knee Manual Muscle test to 3+/5 to improve ambulation and stair navigation.    3. Patient will increase left knee range of motion by 0-110 degrees to improve functional mobility.          Long term goals: In 8 weeks,  Goal Status   4. Patient will be independent with progressed home exercise program to allow for self management of symptoms.     5. Patient will increase bilateral lower extremity Manual Muscle test to 4+/5 for improved ambulation and stair navigation.     6. Patient will increase bilateral knee range of motion to 0-120 degrees to improve functional mobility.     7. Patient will improve Knee injury and Osteoarthritis Outcome Score for Joint Replacement (KOOS Jr.) from 47.5 to 60.0 to demonstrate improved functional mobility and quality of life.     8. Patient will report walking for 30 minutes with <2/10 pain 5x/week to promote healthy lifestyle and regular physical exercise.       Plan   Plan of care Certification: 5/7/2024 to 7/5/2024.    Outpatient Physical Therapy 2 times weekly for 16 visits to include the following interventions: Gait Training, Manual Therapy, Moist Heat/ Ice, Neuromuscular Re-ed, Patient Education, Therapeutic Activities, and Therapeutic Exercise.   Patient will be seen by a physical therapist minimally every 6th visit or every 30 days.    Janet Gold PT

## 2024-05-22 NOTE — PROGRESS NOTES
[facility]   Physical Therapy Treatment Note     Name: Serena Yang  Clinic Number: [id number]    Therapy Diagnosis:   Encounter Diagnoses   Name Primary?    Chronic pain of both knees Yes    Decreased range of motion (ROM) of both knees      Physician: Janelle Zamora MD    Visit Date: 5/23/2024    Physician Orders: Physical Therapy Evaluate and Treat   Medical Diagnosis from Referral: Patellofemoral syndrome, bilateral (M22.2X1,M22.2X2)  Evaluation Date: 5/7/2024  Authorization Period Expiration: 12/31/2024  Plan of Care Certification Period: 5/7/2024 - 7/5/2024  Visit # / Visits authorized: 1/ 10 (+ evaluation)     Progress Note Due: 6/7/2024   FOCUS ON THERAPEUTIC OUTCOMES (FOTO): 5/7/2024 (2/5; 1)  PTA Visit #: 0/5     Precautions: Standard    Time In: 2:33 pm  Time Out: 3:15 pm  Total Billable Time: 42 minutes      SUBJECTIVE     Patient reports: she went home after the evaluation and took some tylenol for the pain. Her  helped her keep up with the exercises. .  She was compliant with home exercise program.  Response to previous treatment: no adverse effects after IE  Functional change: none    Pain: 3/10  Location:bilateral knees     OBJECTIVE     Objective Measures updated at progress report unless specified.       TREATMENT     Serena received the treatments listed below:      received therapeutic exercises to develop strength and RANGE OF MOTION for 12 minutes including:  [x] Recumbent bike level 2 x6 minutes  [x] Heel slides with strap 5 second holds x3 minutes  [x] Hamstring stretch 3x 30 seconds        received the following manual therapy techniques: Soft tissue Mobilization were applied to the: bilateral knees for 00 minutes, including:     received the following dynamic functional therapeutic activities to improve functional performance for 00 minutes, including:     received the following neuromuscular re-education activities to improve: Balance,  Coordination, and Posture for 33 minutes. The following activities were included:  [x] Quadriceps set 20x 5 second holds  [x] Quadriceps set into straight leg raise 2x 10 bilateral    [x] Sidelying hip abduction 2x 10 bilateral   [x] Short arc quadriceps 2x 10 with 5 second hold and eccentric lowering  [] Long arc quadriceps 2x 10 with 5 second hold and eccentric lowering   [x] Bridge 2x 10   [] Edge of mat hip extension 2x 10 bilateral     received cold pack for 00 minutes to bilateral knees.       PATIENT EDUCATION AND HOME EXERCISES     Home Exercises Provided and Patient Education Provided     Education provided:   - home exercise program review  - exercise form and purpose    Written Home Exercises Provided: Patient instructed to cont prior HOME EXERCISE PROGRAM. Exercises were reviewed and Serena was able to demonstrate them prior to the end of the session.  Serena demonstrated good  understanding of the education provided. See EMR under Patient Instructions for exercises provided during therapy sessions    ASSESSMENT     Patient notably fatigued with exercises today during initial treatment session. Cueing for quad control Denied increase in knee pain with exercises today.     Serena Is progressing well towards her goals.   Patient prognosis is Good.     Patient will continue to benefit from skilled outpatient physical therapy to address the deficits listed in the problem list box on initial evaluation, provide patient/family education and to maximize patient's level of independence in the home and community environment.     Patient's spiritual, cultural and educational needs considered and Patient agreeable to plan of care and goals.     Anticipated barriers to physical therapy: none    Goals:      Short term goals: In 4 weeks,  Goal Status   Patient will be independent with home exercise program to promote improved therapy outcomes.   in progress   2. Patient will increase bilateral hip/knee  Manual Muscle test to 3+/5 to improve ambulation and stair navigation.  in progress   3. Patient will increase left knee range of motion by 0-110 degrees to improve functional mobility.    in progress         Long term goals: In 8 weeks,  Goal Status   4. Patient will be independent with progressed home exercise program to allow for self management of symptoms.   in progress   5. Patient will increase bilateral lower extremity Manual Muscle test to 4+/5 for improved ambulation and stair navigation.   in progress   6. Patient will increase bilateral knee range of motion to 0-120 degrees to improve functional mobility.   in progress   7. Patient will improve Knee injury and Osteoarthritis Outcome Score for Joint Replacement (KOOS Jr.) from 47.5 to 60.0 to demonstrate improved functional mobility and quality of life.   in progress   8. Patient will report walking for 30 minutes with <2/10 pain 5x/week to promote healthy lifestyle and regular physical exercise.   in progress        PLAN     Continue with established PT PLAN OF CARE and progress per patient tolerance.      Plan of care Certification: 5/7/2024 to 7/5/2024.     Outpatient Physical Therapy 2 times weekly for 16 visits to include the following interventions: Gait Training, Manual Therapy, Moist Heat/ Ice, Neuromuscular Re-ed, Patient Education, Therapeutic Activities, and Therapeutic Exercise.   Patient will be seen by a physical therapist minimally every 6th visit or every 30 days.    Janet Gold, PT

## 2024-05-23 ENCOUNTER — CLINICAL SUPPORT (OUTPATIENT)
Dept: REHABILITATION | Facility: OTHER | Age: 54
End: 2024-05-23
Payer: COMMERCIAL

## 2024-05-23 DIAGNOSIS — M25.662 DECREASED RANGE OF MOTION (ROM) OF BOTH KNEES: ICD-10-CM

## 2024-05-23 DIAGNOSIS — M25.562 CHRONIC PAIN OF BOTH KNEES: Primary | ICD-10-CM

## 2024-05-23 DIAGNOSIS — M25.661 DECREASED RANGE OF MOTION (ROM) OF BOTH KNEES: ICD-10-CM

## 2024-05-23 DIAGNOSIS — G89.29 CHRONIC PAIN OF BOTH KNEES: Primary | ICD-10-CM

## 2024-05-23 DIAGNOSIS — M25.561 CHRONIC PAIN OF BOTH KNEES: Primary | ICD-10-CM

## 2024-05-23 PROCEDURE — 97112 NEUROMUSCULAR REEDUCATION: CPT | Mod: PN

## 2024-05-23 PROCEDURE — 97110 THERAPEUTIC EXERCISES: CPT | Mod: PN

## 2024-05-31 NOTE — PROGRESS NOTES
[facility]   Physical Therapy Treatment Note     Name: Serena Yang  Clinic Number: [id number]    Therapy Diagnosis:   Encounter Diagnoses   Name Primary?    Chronic pain of both knees Yes    Decreased range of motion (ROM) of both knees      Physician: Janelle Zamora MD    Visit Date: 6/3/2024    Physician Orders: Physical Therapy Evaluate and Treat   Medical Diagnosis from Referral: Patellofemoral syndrome, bilateral (M22.2X1,M22.2X2)  Evaluation Date: 5/7/2024  Authorization Period Expiration: 12/31/2024  Plan of Care Certification Period: 5/7/2024 - 7/5/2024  Progress Note Due: 6/7/2024   Visit # / Visits authorized: 3 (2/ 10)      FOCUS ON THERAPEUTIC OUTCOMES (FOTO): 3/5; 1 (Last issued on 5/7/2024)    PTA Visit #: 1/5     Precautions: Standard    Time In: 4:00 pm  Time Out: 4:50 pm  Total Billable Time: 46 minutes    SUBJECTIVE   Patient reports: Burning sensation in the left knee. Nighttime is worse for pain.    She was compliant with home exercise program.  Response to previous treatment: Had nerve pain in right hip   Functional change: None today    Pain: 3-4/10 left knee; 2/10 right knee  Location: Bilateral knees     OBJECTIVE     5/7/2024:    Flexibility:   Global hip tightness bilaterally        Range of Motion:     Knee Right active/ passive range of motion  Left active/ passive range of motion    Flexion 111/120 82/125   Extension -2 -5         Manual Muscle test/Strength: P! = pain     Hip Right Left   Flexion (limited hip flexion active range of motion) 4-/5 4-/5   Extension 3-/5 3-/5   Internal Rotation 3+/5 3+/5 pain   External Rotation 4/5 4/5   Adduction 3+/5 2/5   Abduction 3+/5 3+/5   Knee       Flexion 5/5 4/5   Extension 4+/5 4/5   Ankle       Dorsiflexion 5/5 4/5 pain   Plantarflexion 3+/5 3-/5 pain    Inversion 4/5 3+/5 pain   Eversion 5/5 3+/5 pain       TREATMENT     Serena received the treatments listed below:      received therapeutic exercises to  "develop strength and range of motion for 14 minutes including:  [x] Recumbent bike level 2 x 6 minutes  [x] Heel slides with strap 5 second holds x 3 minutes each  [x] Hamstring stretch 3 x 30"       received the following manual therapy techniques: Soft tissue Mobilization were applied to the: bilateral knees for 00 minutes, including:         received the following neuromuscular re-education activities to improve: Balance, Coordination, and Posture for 32 minutes. The following activities were included:  [x] Quadriceps set 20 x 5 second holds  [x] Quadriceps set into straight leg raise 2 x 10 bilateral    [x] +Supine ball squeeze 20 x 3"  [x] +Supine clams with Red Theraband x 20  [x] Sidelying hip abduction 2 x 10 bilateral   [x] +Side lying hip adduction 2 x 10 each  [x] Short arc quadriceps 20 x 5" eccentric lowering   [] Long arc quadriceps 20 x 5"  eccentric lowering    [x] Bridge 2 x 10   [] Edge of mat hip extension 2x 10 bilateral     received the following dynamic functional therapeutic activities to improve functional performance for 00 minutes, including:           received cold pack for 00 minutes to bilateral knees      PATIENT EDUCATION AND HOME EXERCISES     Home Exercises Provided and Patient Education Provided     Education provided:   - Continuance of HEP    Written Home Exercises Provided: Patient instructed to cont prior HOME EXERCISE PROGRAM. Exercises were reviewed and Serena was able to demonstrate them prior to the end of the session.  Serena demonstrated good  understanding of the education provided. See EMR under Patient Instructions for exercises provided during therapy sessions    ASSESSMENT   Continued with lower extremity strengthening and quad control today. Patient noted mild discomfort in knee with bridges, but was able to complete exercise. Fatigue was reported at the end of session. Consider introducing functional strengthening in future visit. Will continue to progress " as tolerated.    Serena Is progressing well towards her goals.   Patient prognosis is Good.     Patient will continue to benefit from skilled outpatient physical therapy to address the deficits listed in the problem list box on initial evaluation, provide patient/family education and to maximize patient's level of independence in the home and community environment.     Patient's spiritual, cultural and educational needs considered and Patient agreeable to plan of care and goals.     Anticipated barriers to physical therapy: none    Goals:      Short term goals: In 4 weeks,  Goal Status   Patient will be independent with home exercise program to promote improved therapy outcomes.   in progress   2. Patient will increase bilateral hip/knee Manual Muscle test to 3+/5 to improve ambulation and stair navigation.  in progress   3. Patient will increase left knee range of motion by 0-110 degrees to improve functional mobility.    in progress         Long term goals: In 8 weeks,  Goal Status   4. Patient will be independent with progressed home exercise program to allow for self management of symptoms.   in progress   5. Patient will increase bilateral lower extremity Manual Muscle test to 4+/5 for improved ambulation and stair navigation.   in progress   6. Patient will increase bilateral knee range of motion to 0-120 degrees to improve functional mobility.   in progress   7. Patient will improve Knee injury and Osteoarthritis Outcome Score for Joint Replacement (KOOS Jr.) from 47.5 to 60.0 to demonstrate improved functional mobility and quality of life.   in progress   8. Patient will report walking for 30 minutes with <2/10 pain 5x/week to promote healthy lifestyle and regular physical exercise.   in progress      PLAN     Continue with established PT PLAN OF CARE and progress per patient tolerance.      Plan of care Certification: 5/7/2024 to 7/5/2024.     Outpatient Physical Therapy 2 times weekly for 16 visits to  include the following interventions: Gait Training, Manual Therapy, Moist Heat/ Ice, Neuromuscular Re-ed, Patient Education, Therapeutic Activities, and Therapeutic Exercise.   Patient will be seen by a physical therapist minimally every 6th visit or every 30 days.    Ashley Sanon III, PTA

## 2024-06-03 ENCOUNTER — CLINICAL SUPPORT (OUTPATIENT)
Dept: REHABILITATION | Facility: OTHER | Age: 54
End: 2024-06-03
Payer: COMMERCIAL

## 2024-06-03 ENCOUNTER — DOCUMENTATION ONLY (OUTPATIENT)
Dept: REHABILITATION | Facility: OTHER | Age: 54
End: 2024-06-03

## 2024-06-03 DIAGNOSIS — M25.662 DECREASED RANGE OF MOTION (ROM) OF BOTH KNEES: ICD-10-CM

## 2024-06-03 DIAGNOSIS — M25.562 CHRONIC PAIN OF BOTH KNEES: Primary | ICD-10-CM

## 2024-06-03 DIAGNOSIS — M25.561 CHRONIC PAIN OF BOTH KNEES: Primary | ICD-10-CM

## 2024-06-03 DIAGNOSIS — M25.661 DECREASED RANGE OF MOTION (ROM) OF BOTH KNEES: ICD-10-CM

## 2024-06-03 DIAGNOSIS — G89.29 CHRONIC PAIN OF BOTH KNEES: Primary | ICD-10-CM

## 2024-06-03 PROCEDURE — 97110 THERAPEUTIC EXERCISES: CPT | Mod: PN,CQ

## 2024-06-03 PROCEDURE — 97112 NEUROMUSCULAR REEDUCATION: CPT | Mod: PN,CQ

## 2024-06-03 NOTE — PROGRESS NOTES
PT/PTA met face to face to discuss pt's treatment plan and progress towards established goals. Pt will be seen by a physical therapist minimally every 6th visit or every 30 days.    Ashley Sanon III, PTA

## 2024-06-04 NOTE — PROGRESS NOTES
[facility]   Physical Therapy Treatment Note     Name: Serena Yang  [facility] Number: [id number]    Therapy Diagnosis:   Encounter Diagnoses   Name Primary?    Chronic pain of both knees Yes    Decreased range of motion (ROM) of both knees      Physician: Janelle Zamora MD    Visit Date: 6/5/2024    Physician Orders: Physical Therapy Evaluate and Treat   Medical Diagnosis from Referral: Patellofemoral syndrome, bilateral (M22.2X1,M22.2X2)  Evaluation Date: 5/7/2024  Authorization Period Expiration: 12/31/2024  Plan of Care Certification Period: 5/7/2024 - 7/5/2024  Progress Note Due: 6/7/2024   Visit # / Visits authorized: 4 (3/ 10)      FOCUS ON THERAPEUTIC OUTCOMES (FOTO): 4/5; 1 (Last issued on 5/7/2024)    PTA Visit #: 2/ 5     Precautions: Standard    Time In: 2:33 pm  Time Out: 3:15 pm  Total Billable Time: 42 minutes    SUBJECTIVE   Patient reports: Felt a little tingling in the left knee today when sitting for a while    She was compliant with home exercise program.  Response to previous treatment: Some muscle soreness  Functional change: None today    Pain: 3/10 left knee; 1/10 right knee  Location: Bilateral knees     OBJECTIVE     5/7/2024:    Flexibility:   Global hip tightness bilaterally        Range of Motion:     Knee Right active/ passive range of motion  Left active/ passive range of motion    Flexion 111/120 82/125   Extension -2 -5         Manual Muscle test/Strength: P! = pain     Hip Right Left   Flexion (limited hip flexion active range of motion) 4-/5 4-/5   Extension 3-/5 3-/5   Internal Rotation 3+/5 3+/5 pain   External Rotation 4/5 4/5   Adduction 3+/5 2/5   Abduction 3+/5 3+/5   Knee       Flexion 5/5 4/5   Extension 4+/5 4/5   Ankle       Dorsiflexion 5/5 4/5 pain   Plantarflexion 3+/5 3-/5 pain    Inversion 4/5 3+/5 pain   Eversion 5/5 3+/5 pain       TREATMENT       Patient received therapeutic exercises to develop strength and range of motion for 13  "minutes including:  [x] Recumbent bike level 2 x 5 minutes  [x] Heel slides with strap 5 second holds x 3 minutes each  [x] Hamstring stretch 2 x 30"       Patient received the following manual therapy techniques: Soft tissue Mobilization were applied to the: bilateral knees for 2 minutes, including:   [x] +Tibiofemoral distraction on Left      Patient received the following neuromuscular re-education activities to improve: Balance, Coordination, and Posture for 27 minutes. The following activities were included:  [x] Quadriceps set 20 x 5 second holds   [] Quadriceps set into straight leg raise 2 x 10 bilateral    [x] Supine ball squeeze 20 x 3"   [x] Supine clams with Red Theraband x 20   [x] Sidelying hip abduction 2 x 10 bilateral   [x] Side lying hip adduction 2 x 10 each   [x] Short arc quadriceps 20 x 5" eccentric lowering   [] Long arc quadriceps 20 x 5"  eccentric lowering    [x] Bridge 2 x 10   [] Edge of mat hip extension 2x 10 bilateral     Patient received the following dynamic functional therapeutic activities to improve functional performance for 00 minutes, including:           received cold pack for 00 minutes to bilateral knees      PATIENT EDUCATION AND HOME EXERCISES     Home Exercises Provided and Patient Education Provided     Education provided:   - Continuance of HEP    Written Home Exercises Provided: Patient instructed to cont prior HOME EXERCISE PROGRAM. Exercises were reviewed and Serena was able to demonstrate them prior to the end of the session.  Serena demonstrated good  understanding of the education provided. See EMR under Patient Instructions for exercises provided during therapy sessions    ASSESSMENT   Patient reported mild discomfort in medial left knee, where there was a slight increase in swelling. Performed distraction on left knee, to which patient reported a drease in symptoms. Continued with mat exercises today with no complaints of further pain. Will progress to " functional strengthening next visit and progress as tolerated.    Serena Is progressing well towards her goals.   Patient prognosis is Good.     Patient will continue to benefit from skilled outpatient physical therapy to address the deficits listed in the problem list box on initial evaluation, provide patient/family education and to maximize patient's level of independence in the home and community environment.     Patient's spiritual, cultural and educational needs considered and Patient agreeable to plan of care and goals.     Anticipated barriers to physical therapy: none    Goals:      Short term goals: In 4 weeks,  Goal Status   Patient will be independent with home exercise program to promote improved therapy outcomes.   in progress   2. Patient will increase bilateral hip/knee Manual Muscle test to 3+/5 to improve ambulation and stair navigation.  in progress   3. Patient will increase left knee range of motion by 0-110 degrees to improve functional mobility.    in progress         Long term goals: In 8 weeks,  Goal Status   4. Patient will be independent with progressed home exercise program to allow for self management of symptoms.   in progress   5. Patient will increase bilateral lower extremity Manual Muscle test to 4+/5 for improved ambulation and stair navigation.   in progress   6. Patient will increase bilateral knee range of motion to 0-120 degrees to improve functional mobility.   in progress   7. Patient will improve Knee injury and Osteoarthritis Outcome Score for Joint Replacement (KOOS Jr.) from 47.5 to 60.0 to demonstrate improved functional mobility and quality of life.   in progress   8. Patient will report walking for 30 minutes with <2/10 pain 5x/week to promote healthy lifestyle and regular physical exercise.   in progress      PLAN     Continue with established PT PLAN OF CARE and progress per patient tolerance.      Plan of care Certification: 5/7/2024 to 7/5/2024.     Outpatient  Physical Therapy 2 times weekly for 16 visits to include the following interventions: Gait Training, Manual Therapy, Moist Heat/ Ice, Neuromuscular Re-ed, Patient Education, Therapeutic Activities, and Therapeutic Exercise.   Patient will be seen by a physical therapist minimally every 6th visit or every 30 days.    Ashley Sanon III, PTA

## 2024-06-05 ENCOUNTER — CLINICAL SUPPORT (OUTPATIENT)
Dept: REHABILITATION | Facility: OTHER | Age: 54
End: 2024-06-05
Payer: COMMERCIAL

## 2024-06-05 DIAGNOSIS — M25.562 CHRONIC PAIN OF BOTH KNEES: Primary | ICD-10-CM

## 2024-06-05 DIAGNOSIS — M25.661 DECREASED RANGE OF MOTION (ROM) OF BOTH KNEES: ICD-10-CM

## 2024-06-05 DIAGNOSIS — M25.561 CHRONIC PAIN OF BOTH KNEES: Primary | ICD-10-CM

## 2024-06-05 DIAGNOSIS — G89.29 CHRONIC PAIN OF BOTH KNEES: Primary | ICD-10-CM

## 2024-06-05 DIAGNOSIS — M25.662 DECREASED RANGE OF MOTION (ROM) OF BOTH KNEES: ICD-10-CM

## 2024-06-05 PROCEDURE — 97112 NEUROMUSCULAR REEDUCATION: CPT | Mod: PN,CQ

## 2024-06-05 PROCEDURE — 97110 THERAPEUTIC EXERCISES: CPT | Mod: PN,CQ

## 2024-06-11 NOTE — PROGRESS NOTES
"[facility]   Physical Therapy Treatment Note     Name: Serena Yang  Clinic Number: [id number]    Therapy Diagnosis:   Encounter Diagnoses   Name Primary?    Chronic pain of both knees Yes    Decreased range of motion (ROM) of both knees        Physician: Janelle Zamora MD    Visit Date: 6/12/2024    Physician Orders: Physical Therapy Evaluate and Treat   Medical Diagnosis from Referral: Patellofemoral syndrome, bilateral (M22.2X1,M22.2X2)  Evaluation Date: 5/7/2024  Authorization Period Expiration: 12/31/2024  Plan of Care Certification Period: 5/7/2024 - 7/5/2024  Progress Note Due: 6/7/2024   Visit # / Visits authorized: 5 (4/ 10)      FOCUS ON THERAPEUTIC OUTCOMES (FOTO): 1/5; 2 (Last issued on 6/12/2024)    PTA Visit #: 0/ 5     Precautions: Standard    Time In: 1606  Time Out: 1648   Total Billable Time: 42 minutes    SUBJECTIVE   Patient reports: Felt a little tingling in the left knee today when sitting for a while    She was compliant with home exercise program.  Response to previous treatment: "It was good. A little muscle soreness."  Functional change: can extend the left knee out more    Pain: 2/10 left knee; 2/10 right knee  Location: Bilateral knees     OBJECTIVE     6/12/2024:    Flexibility:   Global hip tightness bilaterally        Range of Motion:     Knee Right active/ passive range of motion  Left active/ passive range of motion    Flexion 111/120 82/125   Extension -2 -5         Manual Muscle test/Strength: P! = pain     Hip Right Left   Flexion (limited hip flexion active range of motion) 4-/5 4-/5   Extension 3-/5 3-/5   Internal Rotation 3+/5 3+/5 pain   External Rotation 4/5 4/5   Adduction 3+/5 2/5   Abduction 3+/5 3+/5   Knee       Flexion 5/5 4/5   Extension 4+/5 4/5   Ankle       Dorsiflexion 5/5 4/5 pain   Plantarflexion 3+/5 3-/5 pain    Inversion 4/5 3+/5 pain   Eversion 5/5 3+/5 pain       TREATMENT       Patient received therapeutic exercises to " "develop strength and range of motion for 5 minutes including:  [x] Recumbent bike level 2 x 5 minutes  [] Heel slides with strap 5 second holds x 3 minutes each  [] Hamstring stretch 2 x 30"       Patient received the following manual therapy techniques: Soft tissue Mobilization were applied to the: bilateral knees for 5 minutes, including:   [x] Tibiofemoral distraction on bilateral knees      Patient received the following neuromuscular re-education activities to improve: Balance, Coordination, and Posture for 27 minutes. The following activities were included:  [x] Quadriceps set 20 x 5 second holds   [] Quadriceps set into straight leg raise 2 x 10 bilateral    [] Supine ball squeeze 20 x 3"   [x] Hook lying 3 way clams with Red Theraband x 20 repetitions each  [x] Sidelying hip abduction 2 x 10 bilateral   [] Side lying hip adduction 2 x 10 each   [x] Short arc quadriceps 20 x 5" eccentric lowering   [] Long arc quadriceps 20 x 5"  eccentric lowering    [x] Bridge 2 x 10   [] Edge of mat hip extension 2x 10 bilateral     Patient received the following dynamic functional therapeutic activities to improve functional performance for 5 minutes, including:   [x] Focus On Therapeutic Outcomes     received cold pack for 00 minutes to bilateral knees      PATIENT EDUCATION AND HOME EXERCISES     Home Exercises Provided and Patient Education Provided     Education provided:   - Continuance of home exercise program     Written Home Exercises Provided: Patient instructed to cont prior HOME EXERCISE PROGRAM. Exercises were reviewed and Serena was able to demonstrate them prior to the end of the session.  Serena demonstrated good  understanding of the education provided. See EMR under Patient Instructions for exercises provided during therapy sessions    ASSESSMENT   Good response to manual therapy last session, continued today with manual therapy and lower extremities strengthening.    Serena Is progressing " well towards her goals.   Patient prognosis is Good.     Patient will continue to benefit from skilled outpatient physical therapy to address the deficits listed in the problem list box on initial evaluation, provide patient/family education and to maximize patient's level of independence in the home and community environment.     Patient's spiritual, cultural and educational needs considered and Patient agreeable to plan of care and goals.     Anticipated barriers to physical therapy: none    Goals:      Short term goals: In 4 weeks,  Goal Status   Patient will be independent with home exercise program to promote improved therapy outcomes.   in progress   2. Patient will increase bilateral hip/knee Manual Muscle test to 3+/5 to improve ambulation and stair navigation.  in progress   3. Patient will increase left knee range of motion by 0-110 degrees to improve functional mobility.    in progress         Long term goals: In 8 weeks,  Goal Status   4. Patient will be independent with progressed home exercise program to allow for self management of symptoms.   in progress   5. Patient will increase bilateral lower extremity Manual Muscle test to 4+/5 for improved ambulation and stair navigation.   in progress   6. Patient will increase bilateral knee range of motion to 0-120 degrees to improve functional mobility.   in progress   7. Patient will improve Knee injury and Osteoarthritis Outcome Score for Joint Replacement (KOOS Jr.) from 47.5 to 60.0 to demonstrate improved functional mobility and quality of life.   in progress   8. Patient will report walking for 30 minutes with <2/10 pain 5x/week to promote healthy lifestyle and regular physical exercise.   in progress      PLAN     Continue with established PT PLAN OF CARE and progress per patient tolerance.      Plan of care Certification: 5/7/2024 to 7/5/2024.     Outpatient Physical Therapy 2 times weekly for 16 visits to include the following interventions: Gait  Training, Manual Therapy, Moist Heat/ Ice, Neuromuscular Re-ed, Patient Education, Therapeutic Activities, and Therapeutic Exercise.   Patient will be seen by a physical therapist minimally every 6th visit or every 30 days.    Galo Lindsay, PT

## 2024-06-12 ENCOUNTER — CLINICAL SUPPORT (OUTPATIENT)
Dept: REHABILITATION | Facility: OTHER | Age: 54
End: 2024-06-12
Payer: COMMERCIAL

## 2024-06-12 DIAGNOSIS — M25.561 CHRONIC PAIN OF BOTH KNEES: Primary | ICD-10-CM

## 2024-06-12 DIAGNOSIS — M25.662 DECREASED RANGE OF MOTION (ROM) OF BOTH KNEES: ICD-10-CM

## 2024-06-12 DIAGNOSIS — G89.29 CHRONIC PAIN OF BOTH KNEES: Primary | ICD-10-CM

## 2024-06-12 DIAGNOSIS — M25.562 CHRONIC PAIN OF BOTH KNEES: Primary | ICD-10-CM

## 2024-06-12 DIAGNOSIS — M25.661 DECREASED RANGE OF MOTION (ROM) OF BOTH KNEES: ICD-10-CM

## 2024-06-12 PROCEDURE — 97112 NEUROMUSCULAR REEDUCATION: CPT | Mod: PN

## 2024-06-12 PROCEDURE — 97110 THERAPEUTIC EXERCISES: CPT | Mod: PN

## 2024-06-14 ENCOUNTER — CLINICAL SUPPORT (OUTPATIENT)
Dept: REHABILITATION | Facility: OTHER | Age: 54
End: 2024-06-14
Payer: COMMERCIAL

## 2024-06-14 DIAGNOSIS — M25.661 DECREASED RANGE OF MOTION (ROM) OF BOTH KNEES: ICD-10-CM

## 2024-06-14 DIAGNOSIS — M25.662 DECREASED RANGE OF MOTION (ROM) OF BOTH KNEES: ICD-10-CM

## 2024-06-14 DIAGNOSIS — M25.561 CHRONIC PAIN OF BOTH KNEES: Primary | ICD-10-CM

## 2024-06-14 DIAGNOSIS — G89.29 CHRONIC PAIN OF BOTH KNEES: Primary | ICD-10-CM

## 2024-06-14 DIAGNOSIS — M25.562 CHRONIC PAIN OF BOTH KNEES: Primary | ICD-10-CM

## 2024-06-14 PROCEDURE — 97112 NEUROMUSCULAR REEDUCATION: CPT | Mod: PN

## 2024-06-14 NOTE — PROGRESS NOTES
"[facility]   Physical Therapy Treatment Note     Name: Serena Yang  [facility] Number: [id number]    Therapy Diagnosis:   Encounter Diagnoses   Name Primary?    Chronic pain of both knees Yes    Decreased range of motion (ROM) of both knees      Physician: Janelle Zamora MD    Visit Date: 6/14/2024    Physician Orders: Physical Therapy Evaluate and Treat   Medical Diagnosis from Referral: Patellofemoral syndrome, bilateral (M22.2X1,M22.2X2)  Evaluation Date: 5/7/2024  Authorization Period Expiration: 12/31/2024  Plan of Care Certification Period: 5/7/2024 - 7/5/2024  Progress Note Due: 7/12/2024   Visit # / Visits authorized: 6 (5/ 10)      FOCUS ON THERAPEUTIC OUTCOMES (FOTO): 2/5; 2 (Last issued on 6/12/2024)    PTA Visit #: 0/ 5     Precautions: Standard    Time In: 7:03 am  Time Out: 8:00 am   Total Billable Time: 57 minutes    SUBJECTIVE   Patient reports: she had some burning yesterday in the knees because she was wearing heels. No real changes from starting therapy - continues to have clicking and burning. She is able to walk a little more and faster. She endorses having some sciatic pain in the right hip that she would also like addressed.     She was compliant with home exercise program.  Response to previous treatment: "It was good. A little muscle soreness."  Functional change: can extend the left knee out more    Pain: 2-3/10   Location: Bilateral knees     OBJECTIVE     Objective measures updated at progress report unless otherwise noted.      TREATMENT       Patient received therapeutic exercises to develop strength and range of motion for 6 minutes including:  [x] Recumbent bike level 2 x 6 minutes  [] Heel slides with strap 5 second holds x 3 minutes each  [] Hamstring stretch 2 x 30"       Patient received the following manual therapy techniques: Soft tissue Mobilization were applied to the: bilateral knees for 00 minutes, including:   [] Tibiofemoral distraction on " "bilateral knees      Patient received the following neuromuscular re-education activities to improve: Balance, Coordination, and Posture for 52 minutes. The following activities were included:  [x] Quadriceps set 20 x 5 second holds   [x] Quadriceps set into straight leg raise with 1# weight 3x 10 bilateral    [x] + straight leg raise with external rotation 2x 10  [x] Transverse Abdominus activation with supine ball squeeze 20 x 3"   [] Hook lying 3 way clams with Red Theraband x 20 repetitions each  [] Sidelying hip abduction 3x 10 bilateral   [x] Side lying hip adduction 3x 10 each   [x] + sidelying clamshell with green band 20x 5 second holds  [] Short arc quadriceps 20 x 5" eccentric lowering   [] Long arc quadriceps 20 x 5"  eccentric lowering    [] Bridge 2 x 10   [] + Figure 4 bridge x10 each (next visit)  [] + Terminal Knee Extension with small green super band 30x 5 second holds (next visit)  [] Edge of mat hip extension 2x 10 bilateral     Patient received the following dynamic functional therapeutic activities to improve functional performance for 00 minutes, including:   [] Focus On Therapeutic Outcomes   [] + side steps with red band 2x 30 feet each (next visit)  [] + shuttle press (next visit)    received cold pack for 00 minutes to bilateral knees      PATIENT EDUCATION AND HOME EXERCISES     Home Exercises Provided and Patient Education Provided     Education provided:   - Continuance of home exercise program     Written Home Exercises Provided: Patient instructed to cont prior HOME EXERCISE PROGRAM. Exercises were reviewed and Serena was able to demonstrate them prior to the end of the session.  Serena demonstrated good  understanding of the education provided. See EMR under Patient Instructions for exercises provided during therapy sessions    ASSESSMENT   Patient appropriately fatigued with exercises progressions today including weights, resistance, and sets/reps. Improving range and " stability noted with exercises. Consider adding exercises noted above in italics including figure 4 bridge, side steps, Terminal Knee Extension, and shuttle press.     Serena Is progressing well towards her goals.   Patient prognosis is Good.     Patient will continue to benefit from skilled outpatient physical therapy to address the deficits listed in the problem list box on initial evaluation, provide patient/family education and to maximize patient's level of independence in the home and community environment.     Patient's spiritual, cultural and educational needs considered and Patient agreeable to plan of care and goals.     Anticipated barriers to physical therapy: none    Goals:      Short term goals: In 4 weeks,  Goal Status   Patient will be independent with home exercise program to promote improved therapy outcomes.   in progress   2. Patient will increase bilateral hip/knee Manual Muscle test to 3+/5 to improve ambulation and stair navigation.  in progress   3. Patient will increase left knee range of motion by 0-110 degrees to improve functional mobility.    in progress         Long term goals: In 8 weeks,  Goal Status   4. Patient will be independent with progressed home exercise program to allow for self management of symptoms.   in progress   5. Patient will increase bilateral lower extremity Manual Muscle test to 4+/5 for improved ambulation and stair navigation.   in progress   6. Patient will increase bilateral knee range of motion to 0-120 degrees to improve functional mobility.   in progress   7. Patient will improve Knee injury and Osteoarthritis Outcome Score for Joint Replacement (KOOS Jr.) from 47.5 to 60.0 to demonstrate improved functional mobility and quality of life.   in progress   8. Patient will report walking for 30 minutes with <2/10 pain 5x/week to promote healthy lifestyle and regular physical exercise.   in progress      PLAN     Continue with established PT PLAN OF CARE and  progress per patient tolerance.      Plan of care Certification: 5/7/2024 to 7/5/2024.     Outpatient Physical Therapy 2 times weekly for 16 visits to include the following interventions: Gait Training, Manual Therapy, Moist Heat/ Ice, Neuromuscular Re-ed, Patient Education, Therapeutic Activities, and Therapeutic Exercise.   Patient will be seen by a physical therapist minimally every 6th visit or every 30 days.    Janet Gold, PT

## 2024-06-25 ENCOUNTER — CLINICAL SUPPORT (OUTPATIENT)
Dept: REHABILITATION | Facility: OTHER | Age: 54
End: 2024-06-25
Payer: COMMERCIAL

## 2024-06-25 DIAGNOSIS — M25.561 CHRONIC PAIN OF BOTH KNEES: Primary | ICD-10-CM

## 2024-06-25 DIAGNOSIS — M25.661 DECREASED RANGE OF MOTION (ROM) OF BOTH KNEES: ICD-10-CM

## 2024-06-25 DIAGNOSIS — M25.662 DECREASED RANGE OF MOTION (ROM) OF BOTH KNEES: ICD-10-CM

## 2024-06-25 DIAGNOSIS — M25.562 CHRONIC PAIN OF BOTH KNEES: Primary | ICD-10-CM

## 2024-06-25 DIAGNOSIS — G89.29 CHRONIC PAIN OF BOTH KNEES: Primary | ICD-10-CM

## 2024-06-25 PROCEDURE — 97530 THERAPEUTIC ACTIVITIES: CPT | Mod: PN,CQ

## 2024-06-25 PROCEDURE — 97112 NEUROMUSCULAR REEDUCATION: CPT | Mod: PN,CQ

## 2024-06-25 NOTE — PROGRESS NOTES
"[facility]   Physical Therapy Treatment Note     Name: Serena Yang  [facility] Number: [id number]    Therapy Diagnosis:   Encounter Diagnoses   Name Primary?    Chronic pain of both knees Yes    Decreased range of motion (ROM) of both knees      Physician: Janelle Zamora MD    Visit Date: 6/25/2024    Physician Orders: Physical Therapy Evaluate and Treat   Medical Diagnosis from Referral: Patellofemoral syndrome, bilateral (M22.2X1,M22.2X2)  Evaluation Date: 5/7/2024  Authorization Period Expiration: 12/31/2024  Plan of Care Certification Period: 5/7/2024 - 7/5/2024  Progress Note Due: 7/12/2024   Visit # / Visits authorized: 7 (6/ 10)      FOCUS ON THERAPEUTIC OUTCOMES (FOTO): 3/5; 2 (Last issued on 6/12/2024)    PTA Visit #: 1/ 5     Precautions: Standard    Time In: 9:23 am   Time Out: 10:04 am   Total Billable Time: 41 minutes    SUBJECTIVE   Patient reports: Burning in the knees, but left side is worse and she's also feeling nerve pain in right leg     She was compliant with home exercise program.  Response to previous treatment: "I was hurting"  Functional change: can extend the left knee out more    Pain: 3/10 right; 5/10 left   Location: Bilateral knees     OBJECTIVE     Objective measures updated at progress report unless otherwise noted.      TREATMENT       Patient received therapeutic exercises to develop strength and range of motion for 3 minutes including:  [x] Recumbent bike level 2 x 3 minutes  [] Heel slides with strap 5 second holds x 3 minutes each  [] Hamstring stretch 2 x 30"       Patient received the following manual therapy techniques: Soft tissue Mobilization were applied to the: bilateral knees for 00 minutes, including:   [] Tibiofemoral distraction on bilateral knees      Patient received the following neuromuscular re-education activities to improve: Balance, Coordination, and Posture for 28 minutes. The following activities were included:  [] Quadriceps set " "20 x 5 second holds   [] Quadriceps set into straight leg raise with 1# weight 3x 10 bilateral    [x] Straight leg raise with external rotation and 1#  2 x 10  [x] Transverse Abdominus activation with supine ball squeeze 20 x 3"   [] Hook lying 3 way clams with Red Theraband x 20 repetitions each  [] Sidelying hip abduction 3 x 10 bilateral   [x] Side lying hip adduction 2 x 10 each   [x] +Sidelying clamshell with Green Theraband 20 x 5 second holds  [] Short arc quadriceps 20 x 5" eccentric lowering   [] Long arc quadriceps 20 x 5"  eccentric lowering    [] Bridge 2 x 10   [x] +Figure 4 bridge x 10 each   [] Edge of mat hip extension 2x 10 bilateral   [x] +Terminal Knee Extension with small green sport band 20 x 5"      Patient received the following dynamic functional therapeutic activities to improve functional performance for 10 minutes, including:   [] Focus On Therapeutic Outcomes   [x] Side steps with Red Theracuff 30ft x  2   [x] Shuttle press with 50#  2 x 10    received cold pack for 00 minutes to bilateral knees      PATIENT EDUCATION AND HOME EXERCISES     Home Exercises Provided and Patient Education Provided     Education provided:   - Continuance of home exercise program     Written Home Exercises Provided: Patient instructed to cont prior HOME EXERCISE PROGRAM. Exercises were reviewed and Serena was able to demonstrate them prior to the end of the session.  Serena demonstrated good  understanding of the education provided. See EMR under Patient Instructions for exercises provided during therapy sessions    ASSESSMENT   Patient tolerated new exercises well today with no reports of increased symptoms. Increased fatigue noted with today's treatment, but no adverse effects. Will continue to progress as tolerated.    Serena Is progressing well towards her goals.   Patient prognosis is Good.     Patient will continue to benefit from skilled outpatient physical therapy to address the deficits " listed in the problem list box on initial evaluation, provide patient/family education and to maximize patient's level of independence in the home and community environment.     Patient's spiritual, cultural and educational needs considered and Patient agreeable to plan of care and goals.     Anticipated barriers to physical therapy: none    Goals:      Short term goals: In 4 weeks,  Goal Status   Patient will be independent with home exercise program to promote improved therapy outcomes.   in progress   2. Patient will increase bilateral hip/knee Manual Muscle test to 3+/5 to improve ambulation and stair navigation.  in progress   3. Patient will increase left knee range of motion by 0-110 degrees to improve functional mobility.    in progress         Long term goals: In 8 weeks,  Goal Status   4. Patient will be independent with progressed home exercise program to allow for self management of symptoms.   in progress   5. Patient will increase bilateral lower extremity Manual Muscle test to 4+/5 for improved ambulation and stair navigation.   in progress   6. Patient will increase bilateral knee range of motion to 0-120 degrees to improve functional mobility.   in progress   7. Patient will improve Knee injury and Osteoarthritis Outcome Score for Joint Replacement (KOOS Jr.) from 47.5 to 60.0 to demonstrate improved functional mobility and quality of life.   in progress   8. Patient will report walking for 30 minutes with <2/10 pain 5x/week to promote healthy lifestyle and regular physical exercise.   in progress      PLAN     Continue with established PT PLAN OF CARE and progress per patient tolerance.      Plan of care Certification: 5/7/2024 to 7/5/2024.     Outpatient Physical Therapy 2 times weekly for 16 visits to include the following interventions: Gait Training, Manual Therapy, Moist Heat/ Ice, Neuromuscular Re-ed, Patient Education, Therapeutic Activities, and Therapeutic Exercise.   Patient will be seen  by a physical therapist minimally every 6th visit or every 30 days.    Ashley Sanon III, PTA

## 2024-06-27 ENCOUNTER — LAB VISIT (OUTPATIENT)
Dept: LAB | Facility: HOSPITAL | Age: 54
End: 2024-06-27
Attending: INTERNAL MEDICINE
Payer: COMMERCIAL

## 2024-06-27 DIAGNOSIS — E03.9 HYPOTHYROIDISM, UNSPECIFIED TYPE: ICD-10-CM

## 2024-06-27 DIAGNOSIS — D50.0 IRON DEFICIENCY ANEMIA DUE TO CHRONIC BLOOD LOSS: ICD-10-CM

## 2024-06-27 DIAGNOSIS — R73.03 PREDIABETES: ICD-10-CM

## 2024-06-27 LAB
ANION GAP SERPL CALC-SCNC: 11 MMOL/L (ref 8–16)
BASOPHILS # BLD AUTO: 0.01 K/UL (ref 0–0.2)
BASOPHILS NFR BLD: 0.2 % (ref 0–1.9)
BUN SERPL-MCNC: 8 MG/DL (ref 6–20)
CALCIUM SERPL-MCNC: 9.7 MG/DL (ref 8.7–10.5)
CHLORIDE SERPL-SCNC: 103 MMOL/L (ref 95–110)
CO2 SERPL-SCNC: 22 MMOL/L (ref 23–29)
CREAT SERPL-MCNC: 0.8 MG/DL (ref 0.5–1.4)
DIFFERENTIAL METHOD BLD: ABNORMAL
EOSINOPHIL # BLD AUTO: 0.1 K/UL (ref 0–0.5)
EOSINOPHIL NFR BLD: 1.4 % (ref 0–8)
ERYTHROCYTE [DISTWIDTH] IN BLOOD BY AUTOMATED COUNT: 21.9 % (ref 11.5–14.5)
EST. GFR  (NO RACE VARIABLE): >60 ML/MIN/1.73 M^2
ESTIMATED AVG GLUCOSE: 105 MG/DL (ref 68–131)
FERRITIN SERPL-MCNC: 17 NG/ML (ref 20–300)
GLUCOSE SERPL-MCNC: 99 MG/DL (ref 70–110)
HBA1C MFR BLD: 5.3 % (ref 4–5.6)
HCT VFR BLD AUTO: 39.2 % (ref 37–48.5)
HGB BLD-MCNC: 12.2 G/DL (ref 12–16)
IMM GRANULOCYTES # BLD AUTO: 0.01 K/UL (ref 0–0.04)
IMM GRANULOCYTES NFR BLD AUTO: 0.2 % (ref 0–0.5)
LYMPHOCYTES # BLD AUTO: 1.8 K/UL (ref 1–4.8)
LYMPHOCYTES NFR BLD: 41.3 % (ref 18–48)
MCH RBC QN AUTO: 25.3 PG (ref 27–31)
MCHC RBC AUTO-ENTMCNC: 31.1 G/DL (ref 32–36)
MCV RBC AUTO: 81 FL (ref 82–98)
MONOCYTES # BLD AUTO: 0.5 K/UL (ref 0.3–1)
MONOCYTES NFR BLD: 10.2 % (ref 4–15)
NEUTROPHILS # BLD AUTO: 2.1 K/UL (ref 1.8–7.7)
NEUTROPHILS NFR BLD: 46.7 % (ref 38–73)
NRBC BLD-RTO: 0 /100 WBC
PLATELET # BLD AUTO: 367 K/UL (ref 150–450)
PMV BLD AUTO: 10.1 FL (ref 9.2–12.9)
POTASSIUM SERPL-SCNC: 3.6 MMOL/L (ref 3.5–5.1)
RBC # BLD AUTO: 4.82 M/UL (ref 4–5.4)
SODIUM SERPL-SCNC: 136 MMOL/L (ref 136–145)
T4 FREE SERPL-MCNC: 1.14 NG/DL (ref 0.71–1.51)
TSH SERPL DL<=0.005 MIU/L-ACNC: 1.04 UIU/ML (ref 0.4–4)
WBC # BLD AUTO: 4.43 K/UL (ref 3.9–12.7)

## 2024-06-27 PROCEDURE — 85025 COMPLETE CBC W/AUTO DIFF WBC: CPT | Performed by: INTERNAL MEDICINE

## 2024-06-27 PROCEDURE — 84439 ASSAY OF FREE THYROXINE: CPT | Performed by: INTERNAL MEDICINE

## 2024-06-27 PROCEDURE — 80048 BASIC METABOLIC PNL TOTAL CA: CPT | Performed by: INTERNAL MEDICINE

## 2024-06-27 PROCEDURE — 36415 COLL VENOUS BLD VENIPUNCTURE: CPT | Mod: PN | Performed by: INTERNAL MEDICINE

## 2024-06-27 PROCEDURE — 86376 MICROSOMAL ANTIBODY EACH: CPT | Performed by: INTERNAL MEDICINE

## 2024-06-27 PROCEDURE — 83036 HEMOGLOBIN GLYCOSYLATED A1C: CPT | Performed by: INTERNAL MEDICINE

## 2024-06-27 PROCEDURE — 84443 ASSAY THYROID STIM HORMONE: CPT | Performed by: INTERNAL MEDICINE

## 2024-06-27 PROCEDURE — 82728 ASSAY OF FERRITIN: CPT | Performed by: INTERNAL MEDICINE

## 2024-06-28 ENCOUNTER — CLINICAL SUPPORT (OUTPATIENT)
Dept: REHABILITATION | Facility: OTHER | Age: 54
End: 2024-06-28
Payer: COMMERCIAL

## 2024-06-28 DIAGNOSIS — G89.29 CHRONIC PAIN OF BOTH KNEES: Primary | ICD-10-CM

## 2024-06-28 DIAGNOSIS — M25.562 CHRONIC PAIN OF BOTH KNEES: Primary | ICD-10-CM

## 2024-06-28 DIAGNOSIS — M25.661 DECREASED RANGE OF MOTION (ROM) OF BOTH KNEES: ICD-10-CM

## 2024-06-28 DIAGNOSIS — M25.662 DECREASED RANGE OF MOTION (ROM) OF BOTH KNEES: ICD-10-CM

## 2024-06-28 DIAGNOSIS — M25.561 CHRONIC PAIN OF BOTH KNEES: Primary | ICD-10-CM

## 2024-06-28 LAB — THYROPEROXIDASE IGG SERPL-ACNC: <6 IU/ML

## 2024-06-28 PROCEDURE — 97112 NEUROMUSCULAR REEDUCATION: CPT | Mod: PN

## 2024-06-28 PROCEDURE — 97530 THERAPEUTIC ACTIVITIES: CPT | Mod: PN

## 2024-06-28 PROCEDURE — 97110 THERAPEUTIC EXERCISES: CPT | Mod: PN

## 2024-06-28 NOTE — PROGRESS NOTES
"[facility]   Physical Therapy Treatment Note     Name: Serena Yang  [facility] Number: [id number]    Therapy Diagnosis:   Encounter Diagnoses   Name Primary?    Chronic pain of both knees Yes    Decreased range of motion (ROM) of both knees      Physician: Janelle Zamora MD    Visit Date: 6/28/2024    Physician Orders: Physical Therapy Evaluate and Treat   Medical Diagnosis from Referral: Patellofemoral syndrome, bilateral (M22.2X1,M22.2X2)  Evaluation Date: 5/7/2024  Authorization Period Expiration: 12/31/2024  Plan of Care Certification Period: 5/7/2024 - 7/5/2024  Progress Note Due: 7/12/2024   Visit # / Visits authorized: 8 (7/ 10)      FOCUS ON THERAPEUTIC OUTCOMES (FOTO): 1/5; 3 (Last issued on 6/28/2024)    PTA Visit #: 0/ 5     Precautions: Standard    Time In: 0700  Time Out: 0749  Total Billable Time: 49 minutes    SUBJECTIVE   Patient reports: states she still has pain in her right hip area    She was compliant with home exercise program.  Response to previous treatment: "Hard, exhausting."  Functional change: continues to report improvement in left knee extension    Pain: 5/10 right; 5/10 left   Location: Bilateral knees     OBJECTIVE     Objective measures updated at progress report unless otherwise noted.      TREATMENT       Patient received therapeutic exercises to develop strength and range of motion for 10 minutes including:  [x] Recumbent bike level 2 x 5 minutes  [] Heel slides with strap 5 second holds x 3 minutes each  [] Hamstring stretch 2 x 30  [x]+piriformis stretch 3 x 30"  [x]+iliotibial band stretch 3 x 30"    Patient received the following manual therapy techniques: Soft tissue Mobilization were applied to the: bilateral knees for 00 minutes, including:   [] Tibiofemoral distraction on bilateral knees      Patient received the following neuromuscular re-education activities to improve: Balance, Coordination, and Posture for 29 minutes. The following " "activities were included:  [] Quadriceps set 20 x 5 second holds   [] Quadriceps set into straight leg raise with 1# weight 3x 10 bilateral    [] Straight leg raise with external rotation and 1#  2 x 10  [] Transverse Abdominus activation with supine ball squeeze 20 x 3"   [x] Hook lying 3 way clams with greenTheraband x 20 repetitions each  [] Sidelying hip abduction 3 x 10 bilateral   [] Side lying hip adduction 2 x 10 each   [] Side lying clamshell with Green Theraband 20 x 5 second holds  [] Short arc quadriceps 20 x 5" eccentric lowering   [] Long arc quadriceps 20 x 5"  eccentric lowering    [x] Bridge 2 x 10   [x] Figure 4 bridge x 10 each   [] Edge of mat hip extension 2x 10 bilateral   [] Terminal Knee Extension with small green sport band 20 x 5"  [x]+standing fire hydrants against wall x 10 repetitions each      Patient received the following dynamic functional therapeutic activities to improve functional performance for 10 minutes, including:   [x] Focus On Therapeutic Outcomes   [x] Side steps with Red Theracuff 30ft x  2   [] Shuttle press with 50#  2 x 10    received cold pack for 00 minutes to bilateral knees      PATIENT EDUCATION AND HOME EXERCISES     Home Exercises Provided and Patient Education Provided     Education provided:   Iliotibial band stretch   Piriformis stretch    Written Home Exercises Provided: yes and  Patient instructed to cont prior HOME EXERCISE PROGRAM. Exercises were reviewed and Jenniekeegan was able to demonstrate them prior to the end of the session.  Serena demonstrated good  understanding of the education provided. See Electronic Medical Record under Patient Instructions for exercises provided during therapy sessions    ASSESSMENT   Added iliotibial band stretches and piriformis stretches to home exercise program. Will continue with hip strengthening to improve functional  and decreased pain.      Serena Is progressing well towards her goals.   Patient prognosis is " Good.     Patient will continue to benefit from skilled outpatient physical therapy to address the deficits listed in the problem list box on initial evaluation, provide patient/family education and to maximize patient's level of independence in the home and community environment.     Patient's spiritual, cultural and educational needs considered and Patient agreeable to plan of care and goals.     Anticipated barriers to physical therapy: none    Goals:      Short term goals: In 4 weeks,  Goal Status   Patient will be independent with home exercise program to promote improved therapy outcomes.   in progress   2. Patient will increase bilateral hip/knee Manual Muscle test to 3+/5 to improve ambulation and stair navigation.  in progress   3. Patient will increase left knee range of motion by 0-110 degrees to improve functional mobility.    in progress         Long term goals: In 8 weeks,  Goal Status   4. Patient will be independent with progressed home exercise program to allow for self management of symptoms.   in progress   5. Patient will increase bilateral lower extremity Manual Muscle test to 4+/5 for improved ambulation and stair navigation.   in progress   6. Patient will increase bilateral knee range of motion to 0-120 degrees to improve functional mobility.   in progress   7. Patient will improve Knee injury and Osteoarthritis Outcome Score for Joint Replacement (KOOS Jr.) from 47.5 to 60.0 to demonstrate improved functional mobility and quality of life.   in progress   8. Patient will report walking for 30 minutes with <2/10 pain 5x/week to promote healthy lifestyle and regular physical exercise.   in progress      PLAN     Continue with established PT PLAN OF CARE and progress per patient tolerance.      Plan of care Certification: 5/7/2024 to 7/5/2024.     Outpatient Physical Therapy 2 times weekly for 16 visits to include the following interventions: Gait Training, Manual Therapy, Moist Heat/ Ice,  Neuromuscular Re-ed, Patient Education, Therapeutic Activities, and Therapeutic Exercise.   Patient will be seen by a physical therapist minimally every 6th visit or every 30 days.    Galo Lindsay, PT

## 2024-06-28 NOTE — PROGRESS NOTES
"[facility]   Physical Therapy Treatment Note     Name: Serena Yang  [facility] Number: [id number]    Therapy Diagnosis:   Encounter Diagnoses   Name Primary?    Chronic pain of both knees Yes    Decreased range of motion (ROM) of both knees      Physician: Janelle Zamora MD    Visit Date: 7/1/2024    Physician Orders: Physical Therapy Evaluate and Treat   Medical Diagnosis from Referral: Patellofemoral syndrome, bilateral (M22.2X1,M22.2X2)  Evaluation Date: 5/7/2024  Authorization Period Expiration: 12/31/2024  Plan of Care Certification Period: 5/7/2024 - 7/5/2024  Progress Note Due: 7/12/2024   Visit # / Visits authorized: 9 (8/ 10)      FOCUS ON THERAPEUTIC OUTCOMES (FOTO): 2/5; 3 (Last issued on 6/28/2024)    PTA Visit #: 1/ 5     Precautions: Standard    Time In: 5:33 pm  Time Out: 6:15 pm  Total Billable Time: 40 minutes    SUBJECTIVE   Patient reports: The worst times is when she's getting out of bed. Still having the burning sensation    She was compliant with home exercise program.  Response to previous treatment: Muscle soreness  Functional change: continues to report improvement in left knee extension    Pain: 2/10 right; 3/10 left   Location: Bilateral knees     OBJECTIVE     Objective measures updated at progress report unless otherwise noted.      TREATMENT       Patient received therapeutic exercises to develop strength and range of motion for 9 minutes including:  [x] Recumbent bike level 2 x 5 minutes  [] Heel slides with strap 5 second holds x 3 minutes each  [] Hamstring stretch 2 x 30  [x] Piriformis stretch 2 x 30"  [x] Iliotibial band stretch 2 x 30"    Patient received the following manual therapy techniques: Soft tissue Mobilization were applied to the: bilateral knees for 00 minutes, including:   [] Tibiofemoral distraction on bilateral knees      Patient received the following neuromuscular re-education activities to improve: Balance, Coordination, and Posture " "for 21 minutes. The following activities were included:  [] Quadriceps set 20 x 5 second holds   [] Quadriceps set into straight leg raise with 1# weight 3x 10 bilateral    [] Straight leg raise with external rotation and 1#  2 x 10  [] Transverse Abdominus activation with supine ball squeeze 20 x 3"   [x] Hook lying 3 way clams with greenTheraband x 20 repetitions each  [] Sidelying hip abduction 3 x 10 bilateral   [x] Side lying hip adduction 2 x 10 each   [] Side lying clamshell with Green Theraband 20 x 5 second holds  [] Short arc quadriceps 20 x 5" eccentric lowering   [] Long arc quadriceps 20 x 5"  eccentric lowering    [x] Bridge 2 x 10   [x] Figure 4 bridge x 10 each   [] Edge of mat hip extension 2x 10 bilateral   [] Terminal Knee Extension with small green sport band 20 x 5"   [x] Standing fire hydrants against wall x 10 repetitions each      Patient received the following dynamic functional therapeutic activities to improve functional performance for 10 minutes, including:   [] Focus On Therapeutic Outcomes   [x] Side steps with Red Theracuff 30ft x  2   [x] +Hesitation marches 30ft x 2  [] Shuttle press with 50#  2 x 10    received cold pack for 00 minutes to bilateral knees      PATIENT EDUCATION AND HOME EXERCISES     Home Exercises Provided and Patient Education Provided     Education provided:   - Continuance of HEP    Written Home Exercises Provided: Patient instructed to cont prior HOME EXERCISE PROGRAM. Exercises were reviewed and Jenniekeegan was able to demonstrate them prior to the end of the session.  Serena demonstrated good  understanding of the education provided. See Electronic Medical Record under Patient Instructions for exercises provided during therapy sessions    ASSESSMENT   Continued with lower extremity strengthening and stretching today. No increase in symptoms with today's treatment. Will continue to progress as tolerated.    Maryannbubba Is progressing well towards her goals. "   Patient prognosis is Good.     Patient will continue to benefit from skilled outpatient physical therapy to address the deficits listed in the problem list box on initial evaluation, provide patient/family education and to maximize patient's level of independence in the home and community environment.     Patient's spiritual, cultural and educational needs considered and Patient agreeable to plan of care and goals.     Anticipated barriers to physical therapy: none    Goals:      Short term goals: In 4 weeks,  Goal Status   Patient will be independent with home exercise program to promote improved therapy outcomes.   in progress   2. Patient will increase bilateral hip/knee Manual Muscle test to 3+/5 to improve ambulation and stair navigation.  in progress   3. Patient will increase left knee range of motion by 0-110 degrees to improve functional mobility.    in progress         Long term goals: In 8 weeks,  Goal Status   4. Patient will be independent with progressed home exercise program to allow for self management of symptoms.   in progress   5. Patient will increase bilateral lower extremity Manual Muscle test to 4+/5 for improved ambulation and stair navigation.   in progress   6. Patient will increase bilateral knee range of motion to 0-120 degrees to improve functional mobility.   in progress   7. Patient will improve Knee injury and Osteoarthritis Outcome Score for Joint Replacement (KOOS Jr.) from 47.5 to 60.0 to demonstrate improved functional mobility and quality of life.   in progress   8. Patient will report walking for 30 minutes with <2/10 pain 5x/week to promote healthy lifestyle and regular physical exercise.   in progress      PLAN     Continue with established PT PLAN OF CARE and progress per patient tolerance.      Plan of care Certification: 5/7/2024 to 7/5/2024.     Outpatient Physical Therapy 2 times weekly for 16 visits to include the following interventions: Gait Training, Manual Therapy,  Moist Heat/ Ice, Neuromuscular Re-ed, Patient Education, Therapeutic Activities, and Therapeutic Exercise.   Patient will be seen by a physical therapist minimally every 6th visit or every 30 days.    Ashley Sanon III, PTA

## 2024-06-28 NOTE — PATIENT INSTRUCTIONS
Supine Active Hamstring Stretch  Hip External Rotation - Piriformis Stretching            - Lie on your back, one knee bent and the other straight  - Grab the bent leg behind the knee with the opposite arm and pull the bent knee towards your opposite armpit  - The stretch may be felt in the outside buttock region or relatively deep in the pelvis.    The opposite knee can be bent or straight at your therapists discretion.     Perform 3 reps holding for 30 seconds.    Copyright © 2024 [employer]    ILIOTIBIAL BAND STRETCH WITH BELT - ITB        Loop a belt around your foot. While lying on your back and leg up in front of you and knee straight, bring your leg across midline for a gentle stretch felt along your outer thigh.    Perform 3 reps holding for 30 seconds.    Copyright © 2024 [employer]

## 2024-07-01 ENCOUNTER — CLINICAL SUPPORT (OUTPATIENT)
Dept: REHABILITATION | Facility: OTHER | Age: 54
End: 2024-07-01
Payer: COMMERCIAL

## 2024-07-01 DIAGNOSIS — M25.562 CHRONIC PAIN OF BOTH KNEES: Primary | ICD-10-CM

## 2024-07-01 DIAGNOSIS — M25.662 DECREASED RANGE OF MOTION (ROM) OF BOTH KNEES: ICD-10-CM

## 2024-07-01 DIAGNOSIS — M25.661 DECREASED RANGE OF MOTION (ROM) OF BOTH KNEES: ICD-10-CM

## 2024-07-01 DIAGNOSIS — G89.29 CHRONIC PAIN OF BOTH KNEES: Primary | ICD-10-CM

## 2024-07-01 DIAGNOSIS — M25.561 CHRONIC PAIN OF BOTH KNEES: Primary | ICD-10-CM

## 2024-07-01 PROCEDURE — 97530 THERAPEUTIC ACTIVITIES: CPT | Mod: PN,CQ

## 2024-07-01 PROCEDURE — 97112 NEUROMUSCULAR REEDUCATION: CPT | Mod: PN,CQ

## 2024-07-01 PROCEDURE — 97110 THERAPEUTIC EXERCISES: CPT | Mod: PN,CQ

## 2024-07-19 DIAGNOSIS — N94.6 DYSMENORRHEA: ICD-10-CM

## 2024-07-19 RX ORDER — NAPROXEN 500 MG/1
500 TABLET ORAL 2 TIMES DAILY WITH MEALS
Qty: 60 TABLET | Refills: 5 | OUTPATIENT
Start: 2024-07-19

## 2024-07-19 RX ORDER — CYCLOBENZAPRINE HCL 10 MG
5 TABLET ORAL NIGHTLY
Qty: 90 TABLET | Refills: 3 | OUTPATIENT
Start: 2024-07-19

## 2024-07-19 NOTE — TELEPHONE ENCOUNTER
No care due was identified.  Health [facility] Embedded Care Due Messages. Reference number: [id number].   7/19/2024 9:46:38 AM CDT

## 2024-07-19 NOTE — TELEPHONE ENCOUNTER
No care due was identified.  Health [facility] Embedded Care Due Messages. Reference number: [id number].   7/19/2024 9:44:35 AM CDT

## 2024-07-21 RX ORDER — CYCLOBENZAPRINE HCL 10 MG
5 TABLET ORAL NIGHTLY
Qty: 90 TABLET | Refills: 3 | Status: SHIPPED | OUTPATIENT
Start: 2024-07-21

## 2024-07-21 RX ORDER — NAPROXEN 500 MG/1
500 TABLET ORAL 2 TIMES DAILY WITH MEALS
Qty: 60 TABLET | Refills: 5 | Status: SHIPPED | OUTPATIENT
Start: 2024-07-21

## 2024-08-02 DIAGNOSIS — N94.6 DYSMENORRHEA: ICD-10-CM

## 2024-08-02 RX ORDER — NAPROXEN 500 MG/1
500 TABLET ORAL 2 TIMES DAILY WITH MEALS
Qty: 60 TABLET | Refills: 5 | Status: CANCELLED | OUTPATIENT
Start: 2024-08-02

## 2024-08-02 RX ORDER — CYCLOBENZAPRINE HCL 10 MG
5 TABLET ORAL NIGHTLY
Qty: 90 TABLET | Refills: 3 | Status: SHIPPED | OUTPATIENT
Start: 2024-08-02

## 2024-08-02 RX ORDER — NAPROXEN 500 MG/1
500 TABLET ORAL 2 TIMES DAILY WITH MEALS
Qty: 60 TABLET | Refills: 5 | Status: SHIPPED | OUTPATIENT
Start: 2024-08-02

## 2024-08-02 RX ORDER — CYCLOBENZAPRINE HCL 10 MG
5 TABLET ORAL NIGHTLY
Qty: 90 TABLET | Refills: 3 | Status: CANCELLED | OUTPATIENT
Start: 2024-08-02

## 2024-08-02 NOTE — TELEPHONE ENCOUNTER
No care due was identified.  Health [facility] Embedded Care Due Messages. Reference number: [id number].   8/02/2024 2:42:19 PM CDT

## 2024-08-02 NOTE — TELEPHONE ENCOUNTER
Spoke with pharmacy, they state they have not received refills for cyclobenzaprine (FLEXERIL) 10 MG tablet or naproxen (NAPROSYN) 500 MG tablet, though its showing that it was sent on 07/21/2024. Please send.

## 2024-08-05 PROBLEM — Z00.00 PREVENTATIVE HEALTH CARE: Status: RESOLVED | Noted: 2022-10-18 | Resolved: 2024-08-05

## 2024-09-02 DIAGNOSIS — G61.9 INFLAMMATORY NEUROPATHY: ICD-10-CM

## 2024-09-02 NOTE — TELEPHONE ENCOUNTER
No care due was identified.  Health [facility] Embedded Care Due Messages. Reference number: [id number].   9/02/2024 4:10:01 AM CDT

## 2024-09-03 RX ORDER — GABAPENTIN 300 MG/1
300 CAPSULE ORAL 3 TIMES DAILY
Qty: 90 CAPSULE | Refills: 11 | Status: SHIPPED | OUTPATIENT
Start: 2024-09-03

## 2024-09-03 NOTE — TELEPHONE ENCOUNTER
Refill Routing Note   Medication(s) are not appropriate for processing by [facility] for the following reason(s):        Outside of protocol    [facility] action(s):  Route               Appointments  past 12m or future 3m with PCP    Date Provider   Last Visit   4/30/2024 Janelle Zamora MD   Next Visit   Visit date not found Janelle Zamora MD   ED visits in past 90 days: 0        Note composed:10:23 AM 09/03/2024

## 2024-11-06 DIAGNOSIS — N94.6 DYSMENORRHEA: ICD-10-CM

## 2024-11-06 RX ORDER — PANTOPRAZOLE SODIUM 20 MG/1
20 TABLET, DELAYED RELEASE ORAL
Qty: 180 TABLET | Refills: 3 | Status: SHIPPED | OUTPATIENT
Start: 2024-11-06

## 2024-11-06 NOTE — TELEPHONE ENCOUNTER
----- Message from Marifer sent at 11/6/2024  3:32 PM CST -----  Contact: Pharmacy [phone]  Requesting an RX refill or new RX.    Is this a refill or new RX:     RX name and strength (pantoprazole (PROTONIX) 20 MG tablet      Is this a 30 day or 90 day RX: 90    Pharmacy name and phone #       [id number] - [place], [place] - [address] [place] [address]  Phone: [phone] Fax: [phone]

## 2024-11-06 NOTE — TELEPHONE ENCOUNTER
Care Due:                  Date            Visit Type   Department     Provider  --------------------------------------------------------------------------------                                EP -                              PRIMARY      [facility]  Last Visit: 04-      CARE ([facility])   Care           Janelle Zamora  Next Visit: None Scheduled  None         None Found                                                            Last  Test          Frequency    Reason                     Performed    Due Date  --------------------------------------------------------------------------------    HBA1C.......  6 months...  metFORMIN................  06- 12-    [facility] Embedded Care Due Messages. Reference number: [id number].   11/06/2024 4:56:07 PM CST

## 2024-12-15 ENCOUNTER — OFFICE VISIT (OUTPATIENT)
Dept: URGENT CARE | Facility: CLINIC | Age: 54
End: 2024-12-15
Payer: COMMERCIAL

## 2024-12-15 VITALS
HEART RATE: 98 BPM | SYSTOLIC BLOOD PRESSURE: 136 MMHG | OXYGEN SATURATION: 99 % | RESPIRATION RATE: 19 BRPM | WEIGHT: 245 LBS | HEIGHT: 68 IN | DIASTOLIC BLOOD PRESSURE: 86 MMHG | TEMPERATURE: 98 F | BODY MASS INDEX: 37.13 KG/M2

## 2024-12-15 DIAGNOSIS — B96.89 ACUTE BACTERIAL SINUSITIS: Primary | ICD-10-CM

## 2024-12-15 DIAGNOSIS — J01.90 ACUTE BACTERIAL SINUSITIS: Primary | ICD-10-CM

## 2024-12-15 DIAGNOSIS — R09.81 NASAL CONGESTION: ICD-10-CM

## 2024-12-15 DIAGNOSIS — R05.9 COUGH, UNSPECIFIED TYPE: ICD-10-CM

## 2024-12-15 LAB
CTP QC/QA: YES
SARS-COV-2 AG RESP QL IA.RAPID: NEGATIVE

## 2024-12-15 PROCEDURE — 99214 OFFICE O/P EST MOD 30 MIN: CPT | Mod: S$GLB,,,

## 2024-12-15 PROCEDURE — 87811 SARS-COV-2 COVID19 W/OPTIC: CPT | Mod: QW,S$GLB,,

## 2024-12-15 RX ORDER — AMOXICILLIN 875 MG/1
875 TABLET, FILM COATED ORAL EVERY 12 HOURS
Qty: 14 TABLET | Refills: 0 | Status: SHIPPED | OUTPATIENT
Start: 2024-12-15 | End: 2024-12-22

## 2024-12-15 RX ORDER — PROMETHAZINE HYDROCHLORIDE AND DEXTROMETHORPHAN HYDROBROMIDE 6.25; 15 MG/5ML; MG/5ML
5 SYRUP ORAL NIGHTLY PRN
Qty: 118 ML | Refills: 0 | Status: SHIPPED | OUTPATIENT
Start: 2024-12-15 | End: 2024-12-25

## 2024-12-15 NOTE — PROGRESS NOTES
"Subjective:      Patient ID: Serena Yang is a 54 y.o. female.    Vitals:  height is 5' 8" (1.727 m) and weight is 111.1 kg (245 lb). Her oral temperature is 97.9 °F (36.6 °C). Her blood pressure is 136/86 and her pulse is 98. Her respiration is 19 and oxygen saturation is 99%.     Chief Complaint: Sinus Problem    Patient presents with sinus problem that started 3 days ago. She has chronic rhino-sinusitis that she takes Claratin and Flonase for daily, however now she is experiencing increased congestion with right sided sinus pain and a cough with some dark sputum production.  Denies any CP, SOB, fever, dizziness, or other concerns.     Sinus Problem  This is a new problem. The current episode started in the past 7 days. The problem has been gradually worsening since onset. There has been no fever. Associated symptoms include congestion, coughing, headaches, sinus pressure and sneezing. Pertinent negatives include no chills, ear pain, neck pain, shortness of breath, sore throat or swollen glands. Past treatments include acetaminophen, nasal decongestants and saline nose sprays. The treatment provided no relief.       Constitution: Negative for chills, fever and generalized weakness.   HENT:  Positive for congestion, postnasal drip, sinus pain and sinus pressure. Negative for ear pain and sore throat.    Neck: Negative for neck pain.   Cardiovascular:  Negative for chest pain.   Respiratory:  Positive for cough. Negative for shortness of breath.    Gastrointestinal:  Negative for abdominal pain.   Allergic/Immunologic: Positive for sneezing.   Neurological:  Positive for headaches.      Objective:     Physical Exam   Constitutional: She is oriented to person, place, and time. She appears well-developed. She is cooperative.  Non-toxic appearance. She does not appear ill. No distress.      Comments:Awake, alert, sitting comfortably in exam room   awake  HENT:   Head: Normocephalic and atraumatic.   Ears:   Right " Ear: Hearing, tympanic membrane, external ear and ear canal normal.   Left Ear: Hearing, tympanic membrane, external ear and ear canal normal.   Nose: Rhinorrhea present. No mucosal edema or nasal deformity. No epistaxis. Right sinus exhibits maxillary sinus tenderness. Right sinus exhibits no frontal sinus tenderness. Left sinus exhibits no maxillary sinus tenderness and no frontal sinus tenderness.   Mouth/Throat: Uvula is midline, oropharynx is clear and moist and mucous membranes are normal. No trismus in the jaw. Normal dentition. No uvula swelling. No oropharyngeal exudate, posterior oropharyngeal edema or posterior oropharyngeal erythema.   Eyes: Conjunctivae and lids are normal. No scleral icterus.   Neck: Trachea normal and phonation normal. Neck supple. No edema present. No erythema present. No neck rigidity present.   Cardiovascular: Normal rate, regular rhythm, normal heart sounds and normal pulses.   Pulmonary/Chest: Effort normal and breath sounds normal. No respiratory distress. She has no decreased breath sounds. She has no rhonchi.   Breath sounds clear bilaterally         Comments: Breath sounds clear bilaterally    Abdominal: Normal appearance.   Musculoskeletal: Normal range of motion.         General: No deformity. Normal range of motion.   Neurological: She is alert and oriented to person, place, and time. She exhibits normal muscle tone. Coordination normal.   Skin: Skin is warm, dry, intact, not diaphoretic and not pale.   Psychiatric: Her speech is normal and behavior is normal. Judgment and thought content normal.   Nursing note and vitals reviewed.      Assessment:     1. Acute bacterial sinusitis    2. Nasal congestion    3. Cough, unspecified type        Plan:   Significant nasal congestion and right maxillary sinus tenderness on exam, will treat for bacterial sinusitis with Amoxicillin.  Promethazine for cough.  Discussed plan of care and antibiotic use with patient and she agrees with  plan of care.  Will follow up with PCP and ENT as needed.     Results for orders placed or performed in visit on 12/15/24   SARS Coronavirus 2 Antigen, POCT Manual Read    Collection Time: 12/15/24  9:56 AM   Result Value Ref Range    SARS Coronavirus 2 Antigen Negative Negative     Acceptable Yes          Acute bacterial sinusitis  -     amoxicillin (AMOXIL) 875 MG tablet; Take 1 tablet (875 mg total) by mouth every 12 (twelve) hours. for 7 days  Dispense: 14 tablet; Refill: 0    Nasal congestion  -     SARS Coronavirus 2 Antigen, POCT Manual Read    Cough, unspecified type  -     promethazine-dextromethorphan (PROMETHAZINE-DM) 6.25-15 mg/5 mL Syrp; Take 5 mLs by mouth nightly as needed.  Dispense: 118 mL; Refill: 0

## 2024-12-21 ENCOUNTER — HOSPITAL ENCOUNTER (OUTPATIENT)
Dept: RADIOLOGY | Facility: OTHER | Age: 54
Discharge: HOME | End: 2024-12-21
Attending: INTERNAL MEDICINE
Payer: COMMERCIAL

## 2024-12-21 DIAGNOSIS — Z12.31 ENCOUNTER FOR SCREENING MAMMOGRAM FOR BREAST CANCER: ICD-10-CM

## 2024-12-21 PROCEDURE — 77067 SCR MAMMO BI INCL CAD: CPT | Mod: TC

## 2025-01-04 ENCOUNTER — PATIENT MESSAGE (OUTPATIENT)
Dept: PRIMARY CARE CLINIC | Facility: CLINIC | Age: 55
End: 2025-01-04
Payer: COMMERCIAL

## 2025-01-07 DIAGNOSIS — N94.6 DYSMENORRHEA: ICD-10-CM

## 2025-01-07 RX ORDER — NAPROXEN 500 MG/1
500 TABLET ORAL 2 TIMES DAILY WITH MEALS
Qty: 60 TABLET | Refills: 5 | Status: SHIPPED | OUTPATIENT
Start: 2025-01-07

## 2025-01-07 NOTE — TELEPHONE ENCOUNTER
Care Due:                  Date            Visit Type   Department     Provider  --------------------------------------------------------------------------------                                EP -                              PRIMARY      [facility]  Last Visit: 04-      CARE ([facility])   Care           Janelle Zamora  Next Visit: None Scheduled  None         None Found                                                            Last  Test          Frequency    Reason                     Performed    Due Date  --------------------------------------------------------------------------------    HBA1C.......  6 months...  metFORMIN................  06- 12-    Health [facility] Embedded Care Due Messages. Reference number: [id number].   1/07/2025 8:04:27 AM CST

## 2025-01-17 ENCOUNTER — HOSPITAL ENCOUNTER (OUTPATIENT)
Dept: RADIOLOGY | Facility: OTHER | Age: 55
Discharge: HOME | End: 2025-01-17
Attending: INTERNAL MEDICINE
Payer: COMMERCIAL

## 2025-01-17 DIAGNOSIS — R92.8 ABNORMAL MAMMOGRAM: ICD-10-CM

## 2025-01-17 PROCEDURE — 77065 DX MAMMO INCL CAD UNI: CPT | Mod: 26,RT,, | Performed by: RADIOLOGY

## 2025-01-17 PROCEDURE — 77061 BREAST TOMOSYNTHESIS UNI: CPT | Mod: TC,RT

## 2025-01-17 PROCEDURE — 77061 BREAST TOMOSYNTHESIS UNI: CPT | Mod: 26,RT,, | Performed by: RADIOLOGY

## 2025-01-18 ENCOUNTER — PATIENT MESSAGE (OUTPATIENT)
Dept: PRIMARY CARE CLINIC | Facility: CLINIC | Age: 55
End: 2025-01-18
Payer: COMMERCIAL

## 2025-01-18 DIAGNOSIS — R92.8 ABNORMAL MAMMOGRAM: Primary | ICD-10-CM

## 2025-01-20 DIAGNOSIS — R92.8 ABNORMAL MAMMOGRAM OF RIGHT BREAST: Primary | ICD-10-CM

## 2025-02-17 DIAGNOSIS — M25.562 LEFT KNEE PAIN, UNSPECIFIED CHRONICITY: Primary | ICD-10-CM

## 2025-02-18 ENCOUNTER — HOSPITAL ENCOUNTER (OUTPATIENT)
Dept: RADIOLOGY | Facility: HOSPITAL | Age: 55
Discharge: HOME | End: 2025-02-18
Attending: ORTHOPAEDIC SURGERY
Payer: COMMERCIAL

## 2025-02-18 ENCOUNTER — OFFICE VISIT (OUTPATIENT)
Dept: ORTHOPEDICS | Facility: CLINIC | Age: 55
End: 2025-02-18
Payer: COMMERCIAL

## 2025-02-18 VITALS — HEIGHT: 68 IN | BODY MASS INDEX: 37.12 KG/M2 | WEIGHT: 244.94 LBS

## 2025-02-18 DIAGNOSIS — M17.12 PRIMARY OSTEOARTHRITIS OF LEFT KNEE: ICD-10-CM

## 2025-02-18 DIAGNOSIS — M25.562 ACUTE PAIN OF LEFT KNEE: Primary | ICD-10-CM

## 2025-02-18 DIAGNOSIS — M25.569 KNEE PAIN, UNSPECIFIED CHRONICITY, UNSPECIFIED LATERALITY: ICD-10-CM

## 2025-02-18 DIAGNOSIS — M25.562 LEFT KNEE PAIN, UNSPECIFIED CHRONICITY: ICD-10-CM

## 2025-02-18 PROCEDURE — 73564 X-RAY EXAM KNEE 4 OR MORE: CPT | Mod: TC,PN,LT

## 2025-02-18 NOTE — PROGRESS NOTES
[facility] [address]      Subjective:     Patient ID: Serena Yang is a 54 y.o. female.    Chief Complaint: Pain of the Left Knee    KNEE PAIN: Complains of pain to the leftknee.   PAIN LOCATED: medial  ONSET: 1 year ago.  QUALITY:  Patient states the pain is worsening  HISTORY: Previous knee injury/surgery: No  Hx: None  ASSOCIATED SYMPTOM AND TRIGGERS:Standing/Weightbearing, walking, trouble w stairs, stiffness, swelling, limping, stiffness w sitting   Has tried and failed cardiovascular activities such as walking, biking and resistance exercises  USES ASSISTIVE DEVICE: none  RELIEVED BY:rest  PATIENT DENIES: bruising, redness, deformity      Patient states that she had a twisting injury to the left knee March 2024.  Patient had a few days of swelling but then was back to her normal activities but with the persistent pain as well as catching and locking.  Patient stated that over the last year the pain has slowly gotten worse.  Patient tried approximately 6 weeks of formal physical therapy without improvement.  Patient has never previously had any surgery or injections to the left knee.    Past Medical History:   Diagnosis Date    Chronic bilateral low back pain without sciatica 10/18/2022    Hot flashes 08/08/2023    Nasal septal deviation 10/18/2022    Onychomycosis of great toe 10/18/2022    Snoring 04/30/2024    Enlarged tonsils          Past Surgical History:   Procedure Laterality Date    BREAST BIOPSY Bilateral 01/12/2023    core bx, benign        Current Outpatient Medications   Medication Instructions    cyclobenzaprine (FLEXERIL) 5 mg, Oral, Nightly    ergocalciferol (ERGOCALCIFEROL) 50,000 Units, Oral, Every 7 days    famotidine (PEPCID) 40 mg, Oral, Daily    ferrous sulfate (FEOSOL) 325 mg (65 mg iron) Tab tablet Take one tab po daily with Vit C 500mg daily    fluticasone propionate (FLONASE) 50 mcg/actuation nasal spray SHAKE LIQUID AND USE 2 SPRAYS(100 MCG) IN EACH NOSTRIL EVERY DAY     gabapentin (NEURONTIN) 300 mg, Oral, 3 times daily    levothyroxine (SYNTHROID) 88 mcg, Oral, Before breakfast    LIDOcaine (LIDODERM) 5 % 1 patch, Transdermal, Every 12 hours, Remove & Discard patch within 12 hours or as directed by MD    LIDOcaine (LIDODERM) 5 % 1 patch, Transdermal, Daily, Remove & Discard patch within 12 hours or as directed by MD    loratadine (CLARITIN) 10 mg, Oral    metFORMIN (GLUCOPHAGE) 500 mg, Oral, 2 times daily with meals    montelukast (SINGULAIR) 10 mg, Oral, Nightly    naproxen (NAPROSYN) 500 mg, Oral, 2 times daily with meals    pantoprazole (PROTONIX) 20 mg, Oral, 2 times daily before meals        Review of patient's allergies indicates:  No Known Allergies    Social History[1]    No family history on file.      Review of systems negative except for noted in HPI    Objective:   Physical Exam:     Left knee  Skin atraumatic   Minimal effusion  Tenderness to palpation of the medial joint line  ROM full extension to approximately 110° flexion, this is approximately 20° short of contralateral flexion  Stable anterior/posterior   Stable varus/valgus  Positive Tomi  No groin pain with rotation of hip  Grossly NVI distally    Imaging:   X-Ray knee reviewed, KL 2 changes with minimal joint space narrowing, sclerosis, and osteophytosis.    Assessment:  54-year-old female with 1 year history left knee pain suggestive of meniscal pathology.    Plan :  - we injected the left knee w 1cc of ketorolac, marcaine and lidocaine under sterile conditions with the patient's informed consent for mild/moderate knee oa. KL score 2  - I had a lengthy discussion with the patient regarding various sources of knee pain.  The patient is receiving minimal relief with NSAIDs, injections, and have failed physical therapy/home exercise. At this point, given the acuity of the symptoms, mechanical in nature, physical examination and degenerative findings on radiographs, I think an MRI is warranted to evaluate  for meniscus tear. We will try to help arrange this. I will see the patient back once the MRI is complete.          Walter Gold MD   02/18/2025          [1]   Social History  Socioeconomic History    Marital status:    Tobacco Use    Smoking status: Never    Smokeless tobacco: Never   Substance and Sexual Activity    Alcohol use: Not Currently     Comment: socially    Drug use: Never     Social Drivers of Health     Financial Resource Strain: Low Risk  (1/28/2025)    Received from [facility]    Overall Financial Resource Strain (CARDIA)     Difficulty of Paying Living Expenses: Not hard at all   Food Insecurity: No Food Insecurity (1/28/2025)    Received from [facility]    Hunger Vital Sign     Worried About Running Out of Food in the Last Year: Never true     Ran Out of Food in the Last Year: Never true   Transportation Needs: No Transportation Needs (1/28/2025)    Received from [facility]    PRAPARE - Transportation     Lack of Transportation (Medical): No     Lack of Transportation (Non-Medical): No   Physical Activity: Insufficiently Active (1/28/2025)    Received from [facility]    Exercise Vital Sign     Days of Exercise per Week: 1 day     Minutes of Exercise per Session: 30 min   Stress: Patient Declined (1/28/2025)    Received from [facility]    [employer] - Occupational Stress Questionnaire     Feeling of Stress : Patient declined   Housing Stability: High Risk (2/12/2024)    Housing Stability Vital Sign     Unable to Pay for Housing in the Last Year: No     Number of Places Lived in the Last Year: 16     Unstable Housing in the Last Year: No

## 2025-02-22 ENCOUNTER — HOSPITAL ENCOUNTER (OUTPATIENT)
Dept: RADIOLOGY | Facility: OTHER | Age: 55
Discharge: HOME | End: 2025-02-22
Attending: ORTHOPAEDIC SURGERY
Payer: COMMERCIAL

## 2025-02-22 DIAGNOSIS — M25.569 KNEE PAIN, UNSPECIFIED CHRONICITY, UNSPECIFIED LATERALITY: ICD-10-CM

## 2025-02-22 PROCEDURE — 73721 MRI JNT OF LWR EXTRE W/O DYE: CPT | Mod: TC,LT

## 2025-02-22 PROCEDURE — 73721 MRI JNT OF LWR EXTRE W/O DYE: CPT | Mod: 26,LT,, | Performed by: RADIOLOGY

## 2025-03-18 ENCOUNTER — OFFICE VISIT (OUTPATIENT)
Dept: ORTHOPEDICS | Facility: CLINIC | Age: 55
End: 2025-03-18
Payer: COMMERCIAL

## 2025-03-18 ENCOUNTER — HOSPITAL ENCOUNTER (OUTPATIENT)
Dept: RADIOLOGY | Facility: HOSPITAL | Age: 55
Discharge: HOME | End: 2025-03-18
Attending: ORTHOPAEDIC SURGERY
Payer: COMMERCIAL

## 2025-03-18 ENCOUNTER — CLINICAL SUPPORT (OUTPATIENT)
Dept: LAB | Facility: HOSPITAL | Age: 55
End: 2025-03-18
Attending: ORTHOPAEDIC SURGERY
Payer: COMMERCIAL

## 2025-03-18 VITALS — WEIGHT: 244.94 LBS | BODY MASS INDEX: 37.12 KG/M2 | HEIGHT: 68 IN

## 2025-03-18 DIAGNOSIS — M25.562 ACUTE PAIN OF LEFT KNEE: ICD-10-CM

## 2025-03-18 DIAGNOSIS — S83.242D TEAR OF MEDIAL MENISCUS OF LEFT KNEE, SUBSEQUENT ENCOUNTER: ICD-10-CM

## 2025-03-18 DIAGNOSIS — S83.242D TEAR OF MEDIAL MENISCUS OF LEFT KNEE, SUBSEQUENT ENCOUNTER: Primary | ICD-10-CM

## 2025-03-18 PROBLEM — S83.241A TEAR OF MEDIAL MENISCUS OF RIGHT KNEE, CURRENT: Status: ACTIVE | Noted: 2025-03-18

## 2025-03-18 PROCEDURE — 1159F MED LIST DOCD IN RCRD: CPT | Mod: CPTII,S$GLB,, | Performed by: ORTHOPAEDIC SURGERY

## 2025-03-18 PROCEDURE — 71045 X-RAY EXAM CHEST 1 VIEW: CPT | Mod: 26,,, | Performed by: RADIOLOGY

## 2025-03-18 PROCEDURE — 93005 ELECTROCARDIOGRAM TRACING: CPT

## 2025-03-18 PROCEDURE — 71045 X-RAY EXAM CHEST 1 VIEW: CPT | Mod: TC,FY

## 2025-03-18 PROCEDURE — 99215 OFFICE O/P EST HI 40 MIN: CPT | Mod: S$GLB,,, | Performed by: ORTHOPAEDIC SURGERY

## 2025-03-18 PROCEDURE — 3008F BODY MASS INDEX DOCD: CPT | Mod: CPTII,S$GLB,, | Performed by: ORTHOPAEDIC SURGERY

## 2025-03-18 PROCEDURE — 99999 PR PBB SHADOW E&M-EST. PATIENT-LVL V: CPT | Mod: PBBFAC,,, | Performed by: ORTHOPAEDIC SURGERY

## 2025-03-18 PROCEDURE — G2211 COMPLEX E/M VISIT ADD ON: HCPCS | Mod: S$GLB,,, | Performed by: ORTHOPAEDIC SURGERY

## 2025-03-18 PROCEDURE — 93010 ELECTROCARDIOGRAM REPORT: CPT | Mod: ,,, | Performed by: INTERNAL MEDICINE

## 2025-03-18 NOTE — H&P (VIEW-ONLY)
[facility] [address]      Subjective:     Patient ID: Serena Yang is a 54 y.o. female.    Chief Complaint: Pain and Results of the Left Knee    KNEE PAIN: Complains of pain to the leftknee.   PAIN LOCATED: medial  ONSET: 1 year ago.  QUALITY:  Patient states the pain is worsening  HISTORY: Previous knee injury/surgery: No  Hx: None  ASSOCIATED SYMPTOM AND TRIGGERS:Standing/Weightbearing, walking, trouble w stairs, stiffness, swelling, limping, stiffness w sitting   Has tried and failed cardiovascular activities such as walking, biking and resistance exercises  USES ASSISTIVE DEVICE: none  RELIEVED BY:rest  PATIENT DENIES: bruising, redness, deformity      Patient states that she had a twisting injury to the left knee March 2024.  Patient had a few days of swelling but then was back to her normal activities but with the persistent pain as well as catching and locking.  Patient stated that over the last year the pain has slowly gotten worse.  Patient tried approximately 6 weeks of formal physical therapy without improvement.  Patient has never previously had any surgery or injections to the left knee.    Recent MRI demonstrates complex left medial meniscus tear.  Patient denies any changes in her medical history since last clinic visit.  Patient denies any changes in his status of her knee.    Past Medical History:   Diagnosis Date    Chronic bilateral low back pain without sciatica 10/18/2022    Hot flashes 08/08/2023    Nasal septal deviation 10/18/2022    Onychomycosis of great toe 10/18/2022    Snoring 04/30/2024    Enlarged tonsils          Past Surgical History:   Procedure Laterality Date    BREAST BIOPSY Bilateral 01/12/2023    core bx, benign        Current Outpatient Medications   Medication Instructions    cyclobenzaprine (FLEXERIL) 5 mg, Oral, Nightly    ergocalciferol (ERGOCALCIFEROL) 50,000 Units, Oral, Every 7 days    famotidine (PEPCID) 40 mg, Oral, Daily    ferrous sulfate (FEOSOL) 325 mg  (65 mg iron) Tab tablet Take one tab po daily with Vit C 500mg daily    fluticasone propionate (FLONASE) 50 mcg/actuation nasal spray SHAKE LIQUID AND USE 2 SPRAYS(100 MCG) IN EACH NOSTRIL EVERY DAY    gabapentin (NEURONTIN) 300 mg, Oral, 3 times daily    levothyroxine (SYNTHROID) 88 mcg, Oral, Before breakfast    LIDOcaine (LIDODERM) 5 % 1 patch, Transdermal, Every 12 hours, Remove & Discard patch within 12 hours or as directed by MD    LIDOcaine (LIDODERM) 5 % 1 patch, Transdermal, Daily, Remove & Discard patch within 12 hours or as directed by MD    loratadine (CLARITIN) 10 mg, Oral    metFORMIN (GLUCOPHAGE) 500 mg, Oral, 2 times daily with meals    montelukast (SINGULAIR) 10 mg, Oral, Nightly    naproxen (NAPROSYN) 500 mg, Oral, 2 times daily with meals    pantoprazole (PROTONIX) 20 mg, Oral, 2 times daily before meals      Review of patient's allergies indicates:  No Known Allergies    Social History[1]    No family history on file.      Review of systems negative except for noted in HPI    Objective:   Physical Exam:     Left knee  Skin atraumatic   Minimal effusion  Tenderness to palpation of the medial joint line  ROM full extension to approximately 110° flexion, this is approximately 20° short of contralateral flexion  Stable anterior/posterior   Stable varus/valgus  Positive Tomi  No groin pain with rotation of hip  Grossly NVI distally    Imaging:   X-Ray knee reviewed, KL 2 changes with minimal joint space narrowing, sclerosis, and osteophytosis.    Assessment:  54-year-old female with 1 year history left knee pain suggestive of meniscal pathology, confirmed with MRI to be complex tear medial meniscus.    Plan :  The patient presents today for followup. MRI revealed a tear of the meniscus as well as some degenerative arthritis of the Left knee. AP knee Xray shows >50% maintenance of joint space (ie KL 2). We discussed these findings with the patient. We did discuss arthroscopy and the fact that its  role would hopefully help the symptoms related to meniscal tear; however, would not necessarily address any symptoms related to degenerative arthritis. The patient was also offered a course of PT first but would rather move forward with arthroscopy. The patient understands this and would like to move forward. I think that is reasonable. We reviewed risks and benefits of surgery. We also reviewed the role of physical therapy vs arthroscopy. Research indicates that approx 9 mos of PT will achieve similar results to the near immediate improvement with arthroscopy. The patient would like the faster improvement compared to the delayed improvement with PT. We will go ahead and schedule this at a time that is convenient for the patient.       Walter Gold MD   03/18/2025            [1]   Social History  Socioeconomic History    Marital status:    Tobacco Use    Smoking status: Never    Smokeless tobacco: Never   Substance and Sexual Activity    Alcohol use: Not Currently     Comment: socially    Drug use: Never     Social Drivers of Health     Financial Resource Strain: Low Risk  (1/28/2025)    Received from [facility]    Overall Financial Resource Strain (CARDIA)     Difficulty of Paying Living Expenses: Not hard at all   Food Insecurity: No Food Insecurity (1/28/2025)    Received from [facility]    Hunger Vital Sign     Worried About Running Out of Food in the Last Year: Never true     Ran Out of Food in the Last Year: Never true   Transportation Needs: No Transportation Needs (1/28/2025)    Received from [facility]    PRAPARE - Transportation     Lack of Transportation (Medical): No     Lack of Transportation (Non-Medical): No   Physical Activity: Insufficiently Active (1/28/2025)    Received from [facility]    Exercise Vital Sign     Days of Exercise per Week: 1 day     Minutes of Exercise per Session: 30 min   Stress: Patient Declined (1/28/2025)    Received from [facility]  Occupational Health - Occupational Stress Questionnaire     Feeling of Stress : Patient declined   Housing Stability: High Risk (2/12/2024)    Housing Stability Vital Sign     Unable to Pay for Housing in the Last Year: No     Number of Places Lived in the Last Year: 16     Unstable Housing in the Last Year: No

## 2025-03-18 NOTE — PROGRESS NOTES
[facility] [address]      Subjective:     Patient ID: Serena Yang is a 54 y.o. female.    Chief Complaint: Pain and Results of the Left Knee    KNEE PAIN: Complains of pain to the leftknee.   PAIN LOCATED: medial  ONSET: 1 year ago.  QUALITY:  Patient states the pain is worsening  HISTORY: Previous knee injury/surgery: No  Hx: None  ASSOCIATED SYMPTOM AND TRIGGERS:Standing/Weightbearing, walking, trouble w stairs, stiffness, swelling, limping, stiffness w sitting   Has tried and failed cardiovascular activities such as walking, biking and resistance exercises  USES ASSISTIVE DEVICE: none  RELIEVED BY:rest  PATIENT DENIES: bruising, redness, deformity      Patient states that she had a twisting injury to the left knee March 2024.  Patient had a few days of swelling but then was back to her normal activities but with the persistent pain as well as catching and locking.  Patient stated that over the last year the pain has slowly gotten worse.  Patient tried approximately 6 weeks of formal physical therapy without improvement.  Patient has never previously had any surgery or injections to the left knee.    Recent MRI demonstrates complex left medial meniscus tear.  Patient denies any changes in her medical history since last clinic visit.  Patient denies any changes in his status of her knee.    Past Medical History:   Diagnosis Date    Chronic bilateral low back pain without sciatica 10/18/2022    Hot flashes 08/08/2023    Nasal septal deviation 10/18/2022    Onychomycosis of great toe 10/18/2022    Snoring 04/30/2024    Enlarged tonsils          Past Surgical History:   Procedure Laterality Date    BREAST BIOPSY Bilateral 01/12/2023    core bx, benign        Current Outpatient Medications   Medication Instructions    cyclobenzaprine (FLEXERIL) 5 mg, Oral, Nightly    ergocalciferol (ERGOCALCIFEROL) 50,000 Units, Oral, Every 7 days    famotidine (PEPCID) 40 mg, Oral, Daily    ferrous sulfate (FEOSOL) 325 mg  (65 mg iron) Tab tablet Take one tab po daily with Vit C 500mg daily    fluticasone propionate (FLONASE) 50 mcg/actuation nasal spray SHAKE LIQUID AND USE 2 SPRAYS(100 MCG) IN EACH NOSTRIL EVERY DAY    gabapentin (NEURONTIN) 300 mg, Oral, 3 times daily    levothyroxine (SYNTHROID) 88 mcg, Oral, Before breakfast    LIDOcaine (LIDODERM) 5 % 1 patch, Transdermal, Every 12 hours, Remove & Discard patch within 12 hours or as directed by MD    LIDOcaine (LIDODERM) 5 % 1 patch, Transdermal, Daily, Remove & Discard patch within 12 hours or as directed by MD    loratadine (CLARITIN) 10 mg, Oral    metFORMIN (GLUCOPHAGE) 500 mg, Oral, 2 times daily with meals    montelukast (SINGULAIR) 10 mg, Oral, Nightly    naproxen (NAPROSYN) 500 mg, Oral, 2 times daily with meals    pantoprazole (PROTONIX) 20 mg, Oral, 2 times daily before meals      Review of patient's allergies indicates:  No Known Allergies    Social History[1]    No family history on file.      Review of systems negative except for noted in HPI    Objective:   Physical Exam:     Left knee  Skin atraumatic   Minimal effusion  Tenderness to palpation of the medial joint line  ROM full extension to approximately 110° flexion, this is approximately 20° short of contralateral flexion  Stable anterior/posterior   Stable varus/valgus  Positive Tomi  No groin pain with rotation of hip  Grossly NVI distally    Imaging:   X-Ray knee reviewed, KL 2 changes with minimal joint space narrowing, sclerosis, and osteophytosis.    Assessment:  54-year-old female with 1 year history left knee pain suggestive of meniscal pathology, confirmed with MRI to be complex tear medial meniscus.    Plan :  The patient presents today for followup. MRI revealed a tear of the meniscus as well as some degenerative arthritis of the Left knee. AP knee Xray shows >50% maintenance of joint space (ie KL 2). We discussed these findings with the patient. We did discuss arthroscopy and the fact that its  role would hopefully help the symptoms related to meniscal tear; however, would not necessarily address any symptoms related to degenerative arthritis. The patient was also offered a course of PT first but would rather move forward with arthroscopy. The patient understands this and would like to move forward. I think that is reasonable. We reviewed risks and benefits of surgery. We also reviewed the role of physical therapy vs arthroscopy. Research indicates that approx 9 mos of PT will achieve similar results to the near immediate improvement with arthroscopy. The patient would like the faster improvement compared to the delayed improvement with PT. We will go ahead and schedule this at a time that is convenient for the patient.       Walter Gold MD   03/18/2025            [1]   Social History  Socioeconomic History    Marital status:    Tobacco Use    Smoking status: Never    Smokeless tobacco: Never   Substance and Sexual Activity    Alcohol use: Not Currently     Comment: socially    Drug use: Never     Social Drivers of Health     Financial Resource Strain: Low Risk  (1/28/2025)    Received from [facility]    Overall Financial Resource Strain (CARDIA)     Difficulty of Paying Living Expenses: Not hard at all   Food Insecurity: No Food Insecurity (1/28/2025)    Received from [facility]    Hunger Vital Sign     Worried About Running Out of Food in the Last Year: Never true     Ran Out of Food in the Last Year: Never true   Transportation Needs: No Transportation Needs (1/28/2025)    Received from [facility]    PRAPARE - Transportation     Lack of Transportation (Medical): No     Lack of Transportation (Non-Medical): No   Physical Activity: Insufficiently Active (1/28/2025)    Received from [facility]    Exercise Vital Sign     Days of Exercise per Week: 1 day     Minutes of Exercise per Session: 30 min   Stress: Patient Declined (1/28/2025)    Received from [facility]  Occupational Health - Occupational Stress Questionnaire     Feeling of Stress : Patient declined   Housing Stability: High Risk (2/12/2024)    Housing Stability Vital Sign     Unable to Pay for Housing in the Last Year: No     Number of Places Lived in the Last Year: 16     Unstable Housing in the Last Year: No

## 2025-03-19 ENCOUNTER — RESULTS FOLLOW-UP (OUTPATIENT)
Dept: ORTHOPEDICS | Facility: CLINIC | Age: 55
End: 2025-03-19

## 2025-03-19 LAB
OHS QRS DURATION: 78 MS
OHS QTC CALCULATION: 447 MS

## 2025-03-28 NOTE — BRIEF OP NOTE
[facility]  Brief Operative Note    Surgery Date:  3/31/2025    Surgeon(s) and Role:     Alok Angela - Primary    Assisting Surgeon: Miguel Gusman MD; Agusto Armstrong    Pre-op Diagnosis:  Left knee medial meniscus tear     Post-op Diagnosis:    Post-Op Diagnosis Codes:     * Tear of medial meniscus of left knee, subsequent encounter [S83.242D]     * Acute pain of left knee [M25.562]    Procedure(s) (LRB):  ARTHROSCOPY, KNEE, WITH MEDIAL OR LATERAL MENISCECTOMY (Left)    Anesthesia: Choice    Operative Findings: Left knee medial meniscus tear    Estimated Blood Loss: see full op note          Specimens:   Specimen (24h ago, onward)      None              Discharge Note    OUTCOME: Patient tolerated treatment/procedure well without complication and is now ready for discharge.    DISPOSITION: Home or Self Care    FINAL DIAGNOSIS:  Left knee medial meniscus tear    FOLLOWUP: In clinic    DISCHARGE INSTRUCTIONS:   Activity as tolerated   Ok to change dressing in 3-4 days   Ok to shower after 5 days   Follow up with Dr. Angela in 2 weeks

## 2025-03-28 NOTE — DISCHARGE INSTRUCTIONS
Activity as tolerated   Ok to change dressing in 3-4 days   Ok to shower after 5 days   Follow up with Dr. Angela in 2 weeks      ANESTHESIA  -For the first 24 hours after surgery:  Do not drive, use heavy equipment, make important decisions, or drink alcohol  -It is normal to feel sleepy for several hours.  Rest until you are more awake.  -Have someone stay with you, if needed.  They can watch for problems and help keep you safe.  -Some possible post anesthesia side effects include: nausea and vomiting, sore throat and hoarseness, sleepiness, and dizziness.    PAIN  -If you have pain after surgery, pain medicine will help you feel better.  Take it as directed, before pain becomes severe.  Most pain relievers taken by mouth need at least 20-30 minutes to start working.  -Do not drive or drink alcohol while taking pain medicine.  -Pain medication can upset your stomach.  Taking them with a little food may help.  -Other ways to help control pain: elevation, ice, and relaxation  -Call your surgeon if still having unmanageable pain an hour after taking pain medicine.  -Pain medicine can cause constipation.  Taking an over-the counter stool softener while on prescription pain medicine and drinking plenty of fluids can prevent this side effect.  -Call your surgeon if you have severe side effects like: breathing problems, trouble waking up, dizziness, confusion, or severe constipation.    NAUSEA  -Some people have nausea after surgery.  This is often because of anesthesia, pain, pain medicine, or the stress of surgery.  -Do not push yourself to eat.  Start off with clear liquids and soup.  Slowly move to solid foods.  Don't eat fatty, rich, spicy foods at first.  Eat smaller amounts.  -If you develop persistent nausea and vomiting please notify your surgeon immediately.    BLEEDING  -Different types of surgery require different types of care and dressing changes.  It is important to follow all instructions and advice from  your surgeon.  Change dressing as directed.  Call your surgeon for any concerns regarding postop bleeding.    SIGNS OF INFECTION  -Signs of infection include: fever, swelling, drainage, and redness  -Notify your surgeon if you have a fever of 100.4 F (38.0 C) or higher.  -Notify your surgeon if you notice redness, swelling, increased pain, pus, or a foul smell at the incision site.

## 2025-03-30 ENCOUNTER — ANESTHESIA EVENT (OUTPATIENT)
Dept: SURGERY | Facility: HOSPITAL | Age: 55
End: 2025-03-30
Payer: COMMERCIAL

## 2025-03-30 NOTE — ANESTHESIA PREPROCEDURE EVALUATION
[facility]  Anesthesia Pre-Operative Evaluation         Patient Name: Serena Yang  YOB: 1970  MRN: [id number]    SUBJECTIVE:     Pre-operative evaluation for Procedure(s) (LRB):  ARTHROSCOPY, KNEE, WITH MEDIAL OR LATERAL MENISCECTOMY (Left)     03/30/2025    Serena Yang is a 54 y.o. female with GERD, likely YENI (snoring, daytime hypersomnolence), chronic rhinosinusitis, hx partial thyroidectomy 2/2 teratoma.    Patient now presents for the above procedure(s).    Echo Summary  No results found for this or any previous visit.       Prev airway: Final airway type: Endotracheal airwaySuccessful airway: EMG  Cuffed: Cuffed  Successful intubation technique: Direct laryngoscopy and Stylet  Facilitating devices/methods: Stylet  Endotracheal tube insertion site: Oral  Blade: Cárdenas  Blade size: #2  Placement verified by: auscultation, CO2 detection, chest rise and direct visualization  Breath Sounds: Equal  Cormack-Lehane Classification: grade IIa - partial view of glottis  ETT size: 7.0mm  ETT to lips (cm): 20  Measured from: Lips  Lips/Dentition: Unchanged  Secured Method: Silk Tape Secured Location: Left  Number of attempts at approach: 1     LDA: None documented.      Problem List[1]    Review of patient's allergies indicates:  No Known Allergies    Current Inpatient Medications:      Medications Ordered Prior to Encounter[2]    Past Surgical History:   Procedure Laterality Date    BREAST BIOPSY Bilateral 01/12/2023    core bx, benign       Social History:  Tobacco Use: Low Risk  (3/18/2025)    Patient History     Smoking Tobacco Use: Never     Smokeless Tobacco Use: Never     Passive Exposure: Not on file      Alcohol Use: Not At Risk (1/28/2025)    Received from [facility]    AUDIT-C     Frequency of Alcohol Consumption: Never     Average Number of Drinks: Patient does not drink     Frequency of Binge Drinking: Never        OBJECTIVE:     Vital Signs Range (Last 24H):          Significant Labs:  Lab Results   Component Value Date    WBC 4.02 03/18/2025    HGB 12.0 03/18/2025    HCT 37.5 03/18/2025     03/18/2025    CHOL 218 (H) 04/30/2024    TRIG 81 04/30/2024    HDL 67 04/30/2024    ALT 17 04/30/2024    AST 17 04/30/2024     03/18/2025    K 3.7 03/18/2025     03/18/2025    CREATININE 0.7 03/18/2025    BUN 7 03/18/2025    CO2 23 03/18/2025    TSH 1.041 06/27/2024    INR 0.9 12/16/2022    HGBA1C 5.3 06/27/2024       Diagnostic Studies: No relevant studies.    EKG:   Results for orders placed or performed in visit on 03/18/25   EKG 12-lead    Collection Time: 03/18/25  3:16 PM   Result Value Ref Range    QRS Duration 78 ms    [facility] QTC Calculation 447 ms    Narrative    Test Reason : S83.242D,M25.562,    Vent. Rate :  90 BPM     Atrial Rate :  90 BPM     P-R Int : 180 ms          QRS Dur :  78 ms      QT Int : 366 ms       P-R-T Axes :  55  15  44 degrees    QTcB Int : 447 ms    Normal sinus rhythm  Possible Left atrial enlargement  Borderline Abnormal ECG  No previous ECGs available  Confirmed by Max Downs ([id number]) on 3/19/2025 6:12:28 PM    Referred By: LEE GRIFFITH           Confirmed By: Max Downs       2D ECHO:  TTE:  No results found for this or any previous visit.    VANCE:  No results found for this or any previous visit.    ASSESSMENT/PLAN:             Pre-op Assessment    I have reviewed the Patient Summary Reports.     I have reviewed the Nursing Notes. I have reviewed the NPO Status.   I have reviewed the Medications.     Review of Systems  Anesthesia Hx:  No problems with previous Anesthesia   History of prior surgery of interest to airway management or planning:  Previous anesthesia: General        Denies Family Hx of Anesthesia complications.    Denies Personal Hx of Anesthesia complications.                    Cardiovascular:  Exercise tolerance: good    Denies Hypertension.   Denies MI.  Denies CAD.       Denies Angina.  Denies CHF.                                    Pulmonary:    Denies COPD.  Denies Asthma.   Denies Shortness of breath.                  Hepatic/GI:     GERD         Gerd          Musculoskeletal:  Arthritis        Arthritis          Neurological:    Denies CVA.    Denies Seizures.        Arthritis                           Endocrine:        Obesity / BMI > 30      Physical Exam  General: Oriented, Alert, Cooperative and Well nourished    Airway:  Mallampati: III   Mouth Opening: Normal  TM Distance: Normal  Tongue: Normal  Neck ROM: Normal ROM    Dental:  Periodontal disease    Chest/Lungs:  Normal Respiratory Rate    Heart:  Rhythm: Regular Rhythm        Anesthesia Plan  Type of Anesthesia, risks & benefits discussed:    Anesthesia Type: Gen ETT, Gen Supraglottic Airway, Regional  Intra-op Monitoring Plan: Standard ASA Monitors  Post Op Pain Control Plan: multimodal analgesia, IV/PO Opioids PRN and peripheral nerve block  Induction:  IV  Airway Plan: Direct and Video, Post-Induction  Informed Consent: Informed consent signed with the Patient and all parties understand the risks and agree with anesthesia plan.  All questions answered.   ASA Score: 3  Day of Surgery Review of History & Physical: H&P Update referred to the surgeon/provider.    Ready For Surgery From Anesthesia Perspective.     .           [1]   Patient Active Problem List  Diagnosis    Chronic rhinosinusitis    Vitamin D deficiency    Dysmenorrhea    Fibroids    Mass of left parotid gland    Neuropathy    H/O partial thyroidectomy    Hot flashes    Obesity due to excess calories    Gastroesophageal reflux disease without esophagitis    Patellofemoral syndrome, bilateral    Daytime hypersomnolence    Slow transit constipation    Witnessed episode of apnea    Acute pain of left knee    Decreased range of motion (ROM) of both knees    Primary osteoarthritis of left knee    Tear of medial meniscus of left knee, subsequent encounter   [2]   No current facility-administered medications  on file prior to encounter.     Current Outpatient Medications on File Prior to Encounter   Medication Sig Dispense Refill    cyclobenzaprine (FLEXERIL) 10 MG tablet Take 0.5 tablets (5 mg total) by mouth every evening. 90 tablet 3    ergocalciferol (ERGOCALCIFEROL) 50,000 unit Cap Take 1 capsule (50,000 Units total) by mouth every 7 days. 12 capsule 3    famotidine (PEPCID) 40 MG tablet Take 1 tablet (40 mg total) by mouth once daily. 90 tablet 3    ferrous sulfate (FEOSOL) 325 mg (65 mg iron) Tab tablet Take one tab po daily with Vit C 500mg daily 90 tablet 3    fluticasone propionate (FLONASE) 50 mcg/actuation nasal spray SHAKE LIQUID AND USE 2 SPRAYS(100 MCG) IN EACH NOSTRIL EVERY DAY 48 g 3    gabapentin (NEURONTIN) 300 MG capsule TAKE 1 CAPSULE(300 MG) BY MOUTH THREE TIMES DAILY 90 capsule 11    levothyroxine (SYNTHROID) 88 MCG tablet Take 1 tablet (88 mcg total) by mouth before breakfast. 30 tablet 11    LIDOcaine (LIDODERM) 5 % Place 1 patch onto the skin every 12 (twelve) hours. Remove & Discard patch within 12 hours or as directed by MD 30 patch 0    LIDOcaine (LIDODERM) 5 % Place 1 patch onto the skin once daily. Remove & Discard patch within 12 hours or as directed by MD 30 patch 3    loratadine (CLARITIN) 10 mg tablet TAKE 1 TABLET BY MOUTH ONCE DAILY 90 tablet 2    metFORMIN (GLUCOPHAGE) 500 MG tablet Take 1 tablet (500 mg total) by mouth 2 (two) times daily with meals. 180 tablet 3    montelukast (SINGULAIR) 10 mg tablet TAKE 1 TABLET(10 MG) BY MOUTH EVERY EVENING 90 tablet 2    naproxen (NAPROSYN) 500 MG tablet TAKE 1 TABLET(500 MG) BY MOUTH TWICE DAILY WITH MEALS 60 tablet 5    pantoprazole (PROTONIX) 20 MG tablet Take 1 tablet (20 mg total) by mouth 2 (two) times daily before meals. 180 tablet 3

## 2025-03-31 ENCOUNTER — ANESTHESIA (OUTPATIENT)
Dept: SURGERY | Facility: HOSPITAL | Age: 55
End: 2025-03-31
Payer: COMMERCIAL

## 2025-03-31 ENCOUNTER — HOSPITAL ENCOUNTER (OUTPATIENT)
Facility: HOSPITAL | Age: 55
Discharge: HOME | End: 2025-03-31
Attending: ORTHOPAEDIC SURGERY | Admitting: ORTHOPAEDIC SURGERY
Payer: COMMERCIAL

## 2025-03-31 VITALS
TEMPERATURE: 98 F | HEART RATE: 92 BPM | BODY MASS INDEX: 36.98 KG/M2 | WEIGHT: 244 LBS | HEIGHT: 68 IN | SYSTOLIC BLOOD PRESSURE: 116 MMHG | DIASTOLIC BLOOD PRESSURE: 60 MMHG | RESPIRATION RATE: 15 BRPM | OXYGEN SATURATION: 93 %

## 2025-03-31 DIAGNOSIS — M25.562 LEFT KNEE PAIN: ICD-10-CM

## 2025-03-31 DIAGNOSIS — S83.242D TEAR OF MEDIAL MENISCUS OF LEFT KNEE, SUBSEQUENT ENCOUNTER: Primary | ICD-10-CM

## 2025-03-31 LAB
APPEARANCE FLD: NORMAL
B-HCG UR QL: NEGATIVE
COLOR FLD: YELLOW
CTP QC/QA: YES
LYMPHOCYTES NFR FLD MANUAL: 82 %
MONOS+MACROS NFR FLD MANUAL: 14 %
NEUTROPHILS NFR FLD MANUAL: 4 %
POCT GLUCOSE: 102 MG/DL (ref 70–110)
WBC # FLD: 247 /CU MM

## 2025-03-31 PROCEDURE — 71000015 HC POSTOP RECOV 1ST HR: Performed by: ORTHOPAEDIC SURGERY

## 2025-03-31 PROCEDURE — 63600175 PHARM REV CODE 636 W HCPCS

## 2025-03-31 PROCEDURE — 25000003 PHARM REV CODE 250

## 2025-03-31 PROCEDURE — 25000003 PHARM REV CODE 250: Performed by: STUDENT IN AN ORGANIZED HEALTH CARE EDUCATION/TRAINING PROGRAM

## 2025-03-31 PROCEDURE — 63600175 PHARM REV CODE 636 W HCPCS: Mod: JZ,TB

## 2025-03-31 PROCEDURE — 36000711: Performed by: ORTHOPAEDIC SURGERY

## 2025-03-31 PROCEDURE — 89051 BODY FLUID CELL COUNT: CPT | Performed by: ORTHOPAEDIC SURGERY

## 2025-03-31 PROCEDURE — 71000033 HC RECOVERY, INTIAL HOUR: Performed by: ORTHOPAEDIC SURGERY

## 2025-03-31 PROCEDURE — 71000016 HC POSTOP RECOV ADDL HR: Performed by: ORTHOPAEDIC SURGERY

## 2025-03-31 PROCEDURE — 37000008 HC ANESTHESIA 1ST 15 MINUTES: Performed by: ORTHOPAEDIC SURGERY

## 2025-03-31 PROCEDURE — 27201423 OPTIME MED/SURG SUP & DEVICES STERILE SUPPLY: Performed by: ORTHOPAEDIC SURGERY

## 2025-03-31 PROCEDURE — 63600175 PHARM REV CODE 636 W HCPCS: Performed by: ORTHOPAEDIC SURGERY

## 2025-03-31 PROCEDURE — 37000009 HC ANESTHESIA EA ADD 15 MINS: Performed by: ORTHOPAEDIC SURGERY

## 2025-03-31 PROCEDURE — 29881 ARTHRS KNE SRG MNISECTMY M/L: CPT | Mod: LT,,, | Performed by: ORTHOPAEDIC SURGERY

## 2025-03-31 PROCEDURE — 36000710: Performed by: ORTHOPAEDIC SURGERY

## 2025-03-31 RX ORDER — ACETAMINOPHEN 325 MG/1
325 TABLET ORAL EVERY 4 HOURS
Qty: 84 TABLET | Refills: 0 | Status: SHIPPED | OUTPATIENT
Start: 2025-03-31 | End: 2025-04-14

## 2025-03-31 RX ORDER — HYDROMORPHONE HYDROCHLORIDE 2 MG/ML
0.5 INJECTION, SOLUTION INTRAMUSCULAR; INTRAVENOUS; SUBCUTANEOUS EVERY 5 MIN PRN
Status: DISCONTINUED | OUTPATIENT
Start: 2025-03-31 | End: 2025-03-31 | Stop reason: HOSPADM

## 2025-03-31 RX ORDER — LABETALOL HYDROCHLORIDE 5 MG/ML
INJECTION, SOLUTION INTRAVENOUS
Status: DISCONTINUED | OUTPATIENT
Start: 2025-03-31 | End: 2025-03-31

## 2025-03-31 RX ORDER — LIDOCAINE HYDROCHLORIDE 20 MG/ML
INJECTION, SOLUTION EPIDURAL; INFILTRATION; INTRACAUDAL; PERINEURAL
Status: DISCONTINUED | OUTPATIENT
Start: 2025-03-31 | End: 2025-03-31

## 2025-03-31 RX ORDER — EPINEPHRINE 1 MG/ML
INJECTION, SOLUTION, CONCENTRATE INTRAVENOUS
Status: DISCONTINUED | OUTPATIENT
Start: 2025-03-31 | End: 2025-03-31 | Stop reason: HOSPADM

## 2025-03-31 RX ORDER — GLUCAGON 1 MG
1 KIT INJECTION
Status: DISCONTINUED | OUTPATIENT
Start: 2025-03-31 | End: 2025-03-31 | Stop reason: HOSPADM

## 2025-03-31 RX ORDER — CEFAZOLIN 2 G/1
2 INJECTION, POWDER, FOR SOLUTION INTRAMUSCULAR; INTRAVENOUS ONCE
Status: COMPLETED | OUTPATIENT
Start: 2025-03-31 | End: 2025-03-31

## 2025-03-31 RX ORDER — ONDANSETRON HYDROCHLORIDE 2 MG/ML
4 INJECTION, SOLUTION INTRAVENOUS DAILY PRN
Status: DISCONTINUED | OUTPATIENT
Start: 2025-03-31 | End: 2025-03-31 | Stop reason: HOSPADM

## 2025-03-31 RX ORDER — FENTANYL CITRATE 50 UG/ML
INJECTION, SOLUTION INTRAMUSCULAR; INTRAVENOUS
Status: DISCONTINUED | OUTPATIENT
Start: 2025-03-31 | End: 2025-03-31

## 2025-03-31 RX ORDER — SODIUM CHLORIDE 0.9 % (FLUSH) 0.9 %
10 SYRINGE (ML) INJECTION
Status: DISCONTINUED | OUTPATIENT
Start: 2025-03-31 | End: 2025-03-31 | Stop reason: HOSPADM

## 2025-03-31 RX ORDER — BUPIVACAINE HYDROCHLORIDE 2.5 MG/ML
INJECTION, SOLUTION INFILTRATION; PERINEURAL
Status: DISCONTINUED | OUTPATIENT
Start: 2025-03-31 | End: 2025-03-31 | Stop reason: HOSPADM

## 2025-03-31 RX ORDER — OXYCODONE HYDROCHLORIDE 5 MG/1
5 TABLET ORAL ONCE AS NEEDED
Status: COMPLETED | OUTPATIENT
Start: 2025-03-31 | End: 2025-03-31

## 2025-03-31 RX ORDER — DICLOFENAC SODIUM 75 MG/1
75 TABLET, DELAYED RELEASE ORAL 2 TIMES DAILY
Qty: 84 TABLET | Refills: 0 | Status: SHIPPED | OUTPATIENT
Start: 2025-03-31 | End: 2025-05-12

## 2025-03-31 RX ORDER — MIDAZOLAM HYDROCHLORIDE 1 MG/ML
INJECTION INTRAMUSCULAR; INTRAVENOUS
Status: DISCONTINUED | OUTPATIENT
Start: 2025-03-31 | End: 2025-03-31

## 2025-03-31 RX ORDER — PROPOFOL 10 MG/ML
VIAL (ML) INTRAVENOUS
Status: DISCONTINUED | OUTPATIENT
Start: 2025-03-31 | End: 2025-03-31

## 2025-03-31 RX ORDER — HYDROMORPHONE HYDROCHLORIDE 2 MG/ML
0.2 INJECTION, SOLUTION INTRAMUSCULAR; INTRAVENOUS; SUBCUTANEOUS EVERY 5 MIN PRN
Status: DISCONTINUED | OUTPATIENT
Start: 2025-03-31 | End: 2025-03-31 | Stop reason: HOSPADM

## 2025-03-31 RX ORDER — OXYCODONE HYDROCHLORIDE 5 MG/1
5 TABLET ORAL
Status: DISCONTINUED | OUTPATIENT
Start: 2025-03-31 | End: 2025-03-31 | Stop reason: HOSPADM

## 2025-03-31 RX ORDER — DEXAMETHASONE SODIUM PHOSPHATE 4 MG/ML
INJECTION, SOLUTION INTRA-ARTICULAR; INTRALESIONAL; INTRAMUSCULAR; INTRAVENOUS; SOFT TISSUE
Status: DISCONTINUED | OUTPATIENT
Start: 2025-03-31 | End: 2025-03-31

## 2025-03-31 RX ORDER — ONDANSETRON HYDROCHLORIDE 2 MG/ML
INJECTION, SOLUTION INTRAVENOUS
Status: DISCONTINUED | OUTPATIENT
Start: 2025-03-31 | End: 2025-03-31

## 2025-03-31 RX ADMIN — OXYCODONE 5 MG: 5 TABLET ORAL at 11:03

## 2025-03-31 RX ADMIN — FENTANYL CITRATE 25 MCG: 50 INJECTION INTRAMUSCULAR; INTRAVENOUS at 10:03

## 2025-03-31 RX ADMIN — HYDROMORPHONE HYDROCHLORIDE 0.5 MG: 2 INJECTION, SOLUTION INTRAMUSCULAR; INTRAVENOUS; SUBCUTANEOUS at 11:03

## 2025-03-31 RX ADMIN — LIDOCAINE HYDROCHLORIDE 80 MG: 20 INJECTION, SOLUTION EPIDURAL; INFILTRATION; INTRACAUDAL; PERINEURAL at 10:03

## 2025-03-31 RX ADMIN — PROPOFOL 200 MG: 10 INJECTION, EMULSION INTRAVENOUS at 10:03

## 2025-03-31 RX ADMIN — ONDANSETRON 4 MG: 2 INJECTION, SOLUTION INTRAMUSCULAR; INTRAVENOUS at 10:03

## 2025-03-31 RX ADMIN — LABETALOL HYDROCHLORIDE 2.5 MG: 5 INJECTION, SOLUTION INTRAVENOUS at 10:03

## 2025-03-31 RX ADMIN — MIDAZOLAM HYDROCHLORIDE 2 MG: 1 INJECTION, SOLUTION INTRAMUSCULAR; INTRAVENOUS at 10:03

## 2025-03-31 RX ADMIN — SODIUM CHLORIDE, SODIUM LACTATE, POTASSIUM CHLORIDE, AND CALCIUM CHLORIDE: .6; .31; .03; .02 INJECTION, SOLUTION INTRAVENOUS at 07:03

## 2025-03-31 RX ADMIN — PROPOFOL 20 MG: 10 INJECTION, EMULSION INTRAVENOUS at 10:03

## 2025-03-31 RX ADMIN — PROPOFOL 30 MG: 10 INJECTION, EMULSION INTRAVENOUS at 10:03

## 2025-03-31 RX ADMIN — DEXAMETHASONE SODIUM PHOSPHATE 4 MG: 4 INJECTION, SOLUTION INTRA-ARTICULAR; INTRALESIONAL; INTRAMUSCULAR; INTRAVENOUS; SOFT TISSUE at 10:03

## 2025-03-31 RX ADMIN — CEFAZOLIN 2 G: 2 INJECTION, POWDER, FOR SOLUTION INTRAMUSCULAR; INTRAVENOUS at 10:03

## 2025-03-31 NOTE — PLAN OF CARE-OVED
1320  All discharge criteria met. All VSS, AAO x 4. Lt. Knee drsg CDI. No c/o pain. IV d/c'd.     1325  Pt. To car via w/c with RN

## 2025-03-31 NOTE — INTERVAL H&P NOTE
The patient has been examined and the H&P has been reviewed:    I concur with the findings and no changes have occurred since H&P was written.    Surgery risks, benefits and alternative options discussed and understood by patient/family.          Active Hospital Problems    Diagnosis  POA    Tear of medial meniscus of left knee, subsequent encounter [S83.242D]  Not Applicable    Acute pain of left knee [M25.562]  Yes      Resolved Hospital Problems   No resolved problems to display.

## 2025-03-31 NOTE — H&P
Interval H&P    Patient seen and examined in preop holding area. No changes to history or exam other than noted below.    To OR today for left knee arthroscopy with meniscectomy with Dr. Angela.   ---------------------------------------------  [facility] [address]        Subjective:      Patient ID: Serena Yang is a 54 y.o. female.     Chief Complaint: Pain and Results of the Left Knee     KNEE PAIN: Complains of pain to the leftknee.   PAIN LOCATED: medial  ONSET: 1 year ago.  QUALITY:  Patient states the pain is worsening  HISTORY: Previous knee injury/surgery: No  Hx: None  ASSOCIATED SYMPTOM AND TRIGGERS:Standing/Weightbearing, walking, trouble w stairs, stiffness, swelling, limping, stiffness w sitting   Has tried and failed cardiovascular activities such as walking, biking and resistance exercises  USES ASSISTIVE DEVICE: none  RELIEVED BY:rest  PATIENT DENIES: bruising, redness, deformity       Patient states that she had a twisting injury to the left knee March 2024.  Patient had a few days of swelling but then was back to her normal activities but with the persistent pain as well as catching and locking.  Patient stated that over the last year the pain has slowly gotten worse.  Patient tried approximately 6 weeks of formal physical therapy without improvement.  Patient has never previously had any surgery or injections to the left knee.     Recent MRI demonstrates complex left medial meniscus tear.  Patient denies any changes in her medical history since last clinic visit.  Patient denies any changes in his status of her knee.          Past Medical History:   Diagnosis Date    Chronic bilateral low back pain without sciatica 10/18/2022    Hot flashes 08/08/2023    Nasal septal deviation 10/18/2022    Onychomycosis of great toe 10/18/2022    Snoring 04/30/2024     Enlarged tonsils                 Past Surgical History:   Procedure Laterality Date    BREAST BIOPSY Bilateral 01/12/2023     core bx,  benign              Current Outpatient Medications   Medication Instructions    cyclobenzaprine (FLEXERIL) 5 mg, Oral, Nightly    ergocalciferol (ERGOCALCIFEROL) 50,000 Units, Oral, Every 7 days    famotidine (PEPCID) 40 mg, Oral, Daily    ferrous sulfate (FEOSOL) 325 mg (65 mg iron) Tab tablet Take one tab po daily with Vit C 500mg daily    fluticasone propionate (FLONASE) 50 mcg/actuation nasal spray SHAKE LIQUID AND USE 2 SPRAYS(100 MCG) IN EACH NOSTRIL EVERY DAY    gabapentin (NEURONTIN) 300 mg, Oral, 3 times daily    levothyroxine (SYNTHROID) 88 mcg, Oral, Before breakfast    LIDOcaine (LIDODERM) 5 % 1 patch, Transdermal, Every 12 hours, Remove & Discard patch within 12 hours or as directed by MD    LIDOcaine (LIDODERM) 5 % 1 patch, Transdermal, Daily, Remove & Discard patch within 12 hours or as directed by MD    loratadine (CLARITIN) 10 mg, Oral    metFORMIN (GLUCOPHAGE) 500 mg, Oral, 2 times daily with meals    montelukast (SINGULAIR) 10 mg, Oral, Nightly    naproxen (NAPROSYN) 500 mg, Oral, 2 times daily with meals    pantoprazole (PROTONIX) 20 mg, Oral, 2 times daily before meals      Review of patient's allergies indicates:  No Known Allergies     [Social History]    [Social History]        Socioeconomic History    Marital status:    Tobacco Use    Smoking status: Never    Smokeless tobacco: Never   Substance and Sexual Activity    Alcohol use: Not Currently       Comment: socially    Drug use: Never      Social Drivers of Health           Financial Resource Strain: Low Risk  (1/28/2025)     Received from [facility]     Overall Financial Resource Strain (CARDIA)      Difficulty of Paying Living Expenses: Not hard at all   Food Insecurity: No Food Insecurity (1/28/2025)     Received from [facility]     Hunger Vital Sign      Worried About Running Out of Food in the Last Year: Never true      Ran Out of Food in the Last Year: Never true   Transportation Needs: No Transportation Needs (1/28/2025)      Received from [facility]     PRAPARE - Transportation      Lack of Transportation (Medical): No      Lack of Transportation (Non-Medical): No   Physical Activity: Insufficiently Active (1/28/2025)     Received from [facility]     Exercise Vital Sign      Days of Exercise per Week: 1 day      Minutes of Exercise per Session: 30 min   Stress: Patient Declined (1/28/2025)     Received from [facility]     [employer] [place] [employer] - Occupational Stress Questionnaire      Feeling of Stress : Patient declined   Housing Stability: High Risk (2/12/2024)     Housing Stability Vital Sign      Unable to Pay for Housing in the Last Year: No      Number of Places Lived in the Last Year: 16      Unstable Housing in the Last Year: No        No family history on file.       Review of systems negative except for noted in HPI     Objective:   Physical Exam:      Left knee  Skin atraumatic   Minimal effusion  Tenderness to palpation of the medial joint line  ROM full extension to approximately 110° flexion, this is approximately 20° short of contralateral flexion  Stable anterior/posterior   Stable varus/valgus  Positive Tomi  No groin pain with rotation of hip  Grossly NVI distally     Imaging:   X-Ray knee reviewed, KL 2 changes with minimal joint space narrowing, sclerosis, and osteophytosis.     Assessment:  54-year-old female with 1 year history left knee pain suggestive of meniscal pathology, confirmed with MRI to be complex tear medial meniscus.     Plan :  The patient presents today for followup. MRI revealed a tear of the meniscus as well as some degenerative arthritis of the Left knee. AP knee Xray shows >50% maintenance of joint space (ie KL 2). We discussed these findings with the patient. We did discuss arthroscopy and the fact that its role would hopefully help the symptoms related to meniscal tear; however, would not necessarily address any symptoms related to degenerative arthritis. The patient  was also offered a course of PT first but would rather move forward with arthroscopy. The patient understands this and would like to move forward. I think that is reasonable. We reviewed risks and benefits of surgery. We also reviewed the role of physical therapy vs arthroscopy. Research indicates that approx 9 mos of PT will achieve similar results to the near immediate improvement with arthroscopy. The patient would like the faster improvement compared to the delayed improvement with PT. We will go ahead and schedule this at a time that is convenient for the patient.

## 2025-03-31 NOTE — OP NOTE
3/31/2025    Pre op dx:  Meniscus tear left knee    Post op dx:  Same    Procedure:  Arthroscopic partial medial meniscectomy left knee    Attending Surgeon: Alok Angela MD    Assistants:  Dr. Darby and Dr. Valerio    Complications: none    Estimated bood loss: < 20cc    Implants:  None    Indication:  Is a 54-year-old female with mechanical knee pain.  She had failed nonsurgical management including injections and physical therapy.  MRI revealed a tear of the medial meniscus.  We discussed the fact that arthroscopy hopefully help her symptoms related to meniscus tear however may not address any symptoms related to degenerative arthritis.  She understood this and agreed move forward with arthroscopy.    Findings:  Patella cartilage grade 2, trochlea grade 2, medial femoral condyle grade 3, medial tibial plateau grade 2, lateral tibial plateau grade 3, lateral femoral condyle grade 2    Procedure:  Patient is brought to operating room placed supine operative table.  General anesthesia was started without any difficulties.  Left knee was placed in leg toribio foot table flexed.  Posterior aspect of the right knee was well padded.  Prior to incision proper sent procedures well as antibiotic administration was verified.  Anterolateral and anteromedial portal sites injected Marcaine.  Eleven blade was used to establish the anterolateral portal.  This then dilated using a trocar and cannula.  Camera was placed in suprapatellar pouch.  Patella tracking slightly laterally within trochlear groove.  Knee was then flexed medial compartment visualized.  Spinal needle was used to establish the anteromedial portal.  This then created using 11 blade and dilated using a trocar.  Knee was then placed in valgus position medial compartment probed.  There was a horizontal cleavage last parrot-beak tear of the posterior horn midbody junction of the medial meniscus.  This was debrided to a stable rim using a shaver.  Knee was then flexed  and ACL visualized.  This intact no tears or disruptions.  Knee was then placed in figure 4 position lateral compartment visualized.  Lateral meniscus was intact with no tears or disruptions.  Camera was then placed in suprapatellar pouch and all excess fluid evacuated via the cannula.  Portal sites were then closed Dermabond Steri-Strips injected Marcaine.  Incisions were then dressed with sterile ABD and Ace wrap.  Patient was then extubated by anesthesia and transferred to recovery room bed in stable condition.          Note dictated with voice recognition software, please excuse any grammatical errors.

## 2025-03-31 NOTE — TRANSFER OF CARE
"Anesthesia Transfer of Care Note    Patient: Serena Yang    Procedure(s) Performed: Procedure(s) (LRB):  ARTHROSCOPY, KNEE, WITH MEDIAL OR LATERAL MENISCECTOMY (Left)    Patient location: PACU    Anesthesia Type: general    Transport from OR: Transported from OR on 6-10 L/min O2 by face mask with adequate spontaneous ventilation    Post pain: adequate analgesia    Post assessment: no apparent anesthetic complications and tolerated procedure well    Post vital signs: stable    Level of consciousness: alert    Nausea/Vomiting: no nausea/vomiting    Complications: none    Transfer of care protocol was followed      Last vitals: Visit Vitals  BP (!) 140/67   Pulse 102   Temp 36.3 °C (97.3 °F) (Skin)   Resp 18   Ht 5' 8" (1.727 m)   Wt 110.7 kg (244 lb)   SpO2 95%   Breastfeeding No   BMI 37.10 kg/m²     "

## 2025-03-31 NOTE — ANESTHESIA PROCEDURE NOTES
Intubation    Date/Time: 3/31/2025 10:15 AM    Performed by: Adonay Santana MD  Authorized by: Adonay Kwon,     Intubation:     Induction:  Intravenous    Intubated:  Postinduction    Mask Ventilation:  Not attempted    Attempts:  1    Attempted By:  Resident anesthesiologist    Method of Intubation:  Blind intubation    Difficult Airway Encountered?: No      Complications:  None    Airway Device:  Supraglottic airway/LMA    Airway Device Size:  4.0    Placement Verified By:  Capnometry    Complicating Factors:  None    Findings Post-Intubation:  BS equal bilateral and atraumatic/condition of teeth unchanged

## 2025-04-01 ENCOUNTER — CLINICAL SUPPORT (OUTPATIENT)
Dept: REHABILITATION | Facility: OTHER | Age: 55
End: 2025-04-01
Attending: ORTHOPAEDIC SURGERY
Payer: COMMERCIAL

## 2025-04-01 DIAGNOSIS — M25.562 CHRONIC PAIN OF LEFT KNEE: ICD-10-CM

## 2025-04-01 DIAGNOSIS — M25.662 DECREASED RANGE OF MOTION (ROM) OF LEFT KNEE: ICD-10-CM

## 2025-04-01 DIAGNOSIS — R29.898 WEAKNESS OF LEFT LOWER EXTREMITY: ICD-10-CM

## 2025-04-01 DIAGNOSIS — G89.29 CHRONIC PAIN OF LEFT KNEE: ICD-10-CM

## 2025-04-01 DIAGNOSIS — S83.242D TEAR OF MEDIAL MENISCUS OF LEFT KNEE, SUBSEQUENT ENCOUNTER: ICD-10-CM

## 2025-04-01 PROCEDURE — 97161 PT EVAL LOW COMPLEX 20 MIN: CPT | Mod: PN

## 2025-04-01 PROCEDURE — 97112 NEUROMUSCULAR REEDUCATION: CPT | Mod: PN

## 2025-04-01 NOTE — ANESTHESIA POSTPROCEDURE EVALUATION
Anesthesia Post Evaluation    Patient: Serena Yang    Procedure(s) Performed: Procedure(s) (LRB):  ARTHROSCOPY, KNEE, WITH MEDIAL OR LATERAL MENISCECTOMY (Left)    Final Anesthesia Type: general      Patient location during evaluation: PACU  Patient participation: Yes- Able to Participate  Level of consciousness: awake and alert, oriented and awake  Post-procedure vital signs: reviewed and stable  Pain management: adequate  Airway patency: patent  YENI mitigation strategies: Multimodal analgesia and Extubation while patient is awake  PONV status at discharge: No PONV  Anesthetic complications: no      Cardiovascular status: hemodynamically stable  Respiratory status: spontaneous ventilation and room air  Hydration status: euvolemic  Follow-up not needed.              Vitals Value Taken Time   /60 03/31/25 13:20   Temp 36.5 °C (97.7 °F) 03/31/25 12:00   Pulse 92 03/31/25 13:20   Resp 15 03/31/25 13:20   SpO2 93 % 03/31/25 12:30         Event Time   Out of Recovery 11:52:51         Pain/Sally Score: Pain Rating Prior to Med Admin: 6 (3/31/2025 11:38 AM)  Sally Score: 10 (3/31/2025  1:20 PM)

## 2025-04-02 PROBLEM — R29.898 WEAKNESS OF LEFT LOWER EXTREMITY: Status: ACTIVE | Noted: 2025-04-02

## 2025-04-02 NOTE — PROGRESS NOTES
Outpatient Rehab    Physical Therapy Evaluation    Patient Name: Serena Yang  MRN: [id number]  YOB: 1970  Encounter Date: 4/1/2025    Therapy Diagnosis:   Encounter Diagnoses   Name Primary?    Tear of medial meniscus of left knee, subsequent encounter     Chronic pain of left knee     Decreased range of motion (ROM) of left knee     Weakness of left lower extremity      Physician: Alok Angela MD    Physician Orders: Eval and Treat  Medical Diagnosis: Tear of medial meniscus of left knee, subsequent encounter  Acute pain of left knee    Visit # / Visits Authorized:  1 / 1  Insurance Authorization Period: 3/18/2025 to 3/18/2026  Date of Evaluation: 4/1/2025  Plan of Care Certification: 4/1/2025 to 7/1/25     Time In: 1805   Time Out: 1900  Total Time: 55   Total Billable Time: 55    Intake Outcome Measure for FOTO Survey    Therapist reviewed FOTO scores for Serena Yang on 4/1/2025.   FOTO report - see Media section or FOTO account episode details.     Intake Score: See media section         Subjective   History of Present Illness  Serena is a 54 y.o. female who reports to physical therapy with a chief concern of L knee pain.     The patient reports a medical diagnosis of Tear of medial meniscus of left knee, subsequent encounter (S83.242D) Acute pain of left knee (M25.562). The patient has experienced this issue since 04/01/25. Patient reports a surgery of Partial medial menisectomy left knee. Surgery occurred on 03/31/25.         History of Present Condition/Illness: Serena is a 54 year-old female who presents to physical therapy status post left knee partial medial menisectomy on 3/31/25. She reports her left knee had been bothering her for about a year prior to the surgery as she states she stumbled on a rock and tweaked her knee. Serena describes her knee pain as sharp and shooting and states her pain is located throughout her left knee. Current aggravators of her left knee  includes straightening her knee, getting in/out of her car, and navigating steps. Eases have included the use of pain medication, message, and icing. Serena states she has 6 steps to enter her house. 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 She reports she has 2 weeks off. Serena would like to attend physical therapy to improve the function of her left knee.     Pain     Patient reports a current pain level of 5/10. Pain at best is reported as 5/10. Pain at worst is reported as 9/10.   Location: Left knee  Clinical Progression (since onset): Stable  Pain Qualities: Sharp  Pain-Relieving Factors: Ice, Medications - prescription, Massage  Pain-Aggravating Factors: Stair climbing, Other (Comment), Walking  Other Pain-Aggravating Factors: Straightening the left knee         Living Arrangements  Living Situation  Housing: Home independently  Living Arrangements: Spouse/significant other        Employment  Patient reports: Does the patient's condition impact their ability to work?  Employment Status: Employed full-time   Nurse      Past Medical History/Physical Systems Review:   Serena Yang  has a past medical history of Chronic bilateral low back pain without sciatica, Hot flashes, Nasal septal deviation, Onychomycosis of great toe, and Snoring.    Serena Yang  has a past surgical history that includes Breast biopsy (Bilateral, 01/12/2023) and arthroscopy, knee, with medial or lateral meniscectomy (Left, 3/31/2025).    Serena has a current medication list which includes the following prescription(s): acetaminophen, diclofenac, ergocalciferol, famotidine, ferrous sulfate, fluticasone propionate, gabapentin, levothyroxine, lidocaine, lidocaine, loratadine, metformin, montelukast, pantoprazole, and suzetrigine.    Review of patient's allergies indicates:  No Known Allergies     Objective   Knee Observations  Right Knee Observations  Not Present: Edema and Straight Leg Raise Extensor Lag  Left  "Knee Observations  Present: Edema and Straight Leg Raise Extensor Lag             Knee Range of Motion   Right Knee   Active (deg) Passive (deg) Pain   Flexion 115 120     Extension 3 5         Left Knee   Active (deg) Passive (deg) Pain   Flexion 55 70     Extension -2 0                        Knee Strength   Right Strength Right Pain Left Strength Left  Pain   Flexion (S2)           Prone Flexion           Extension (L3)             Knee Extensor Lag  Lag Present: Left  No Lag: Right  Appropriate quad set bilateral        Knee Patellar Screening       Right Patellar Mobility  Hypomobile: Superior and Inferior  Left Patellar Mobility  Hypomobile: Superior and Inferior               Gait Analysis  Gait Pattern: Waddling, Antalgic  Walking Speed: Decreased         Left Foot Contact Pattern: Flat foot         Treatment:  Balance/Neuromuscular Re-Education  NMR 1: Quad sets x 25 with 10" holds  NMR 2: Short arc quads x 25 with 10" holds  NMR 3: Standing SLR 2 x 10  NMR 4: AAROM at edge of table x 25  NMR 5: Prolonged passive stretch x 8'    Time Entry(in minutes):  PT Evaluation (Low) Time Entry: 25  Neuromuscular Re-Education Time Entry: 30    Assessment & Plan   Assessment  Serena presents with a condition of Low complexity.   Presentation of Symptoms: Stable  Will Comorbidities Impact Care: No       Functional Limitations: Activity tolerance, Gait limitations, Functional mobility, Pain with ADLs/IADLs, Participating in leisure activities, Range of motion, Squatting  Impairments: Abnormal gait, Abnormal or restricted range of motion, Impaired physical strength, Pain with functional activity    Prognosis: Good  Assessment Details: Serena presents to physical therapy following left knee medial menisectomy. Bandage was not removed given evaluation was 1 day post-op surgical intervention. Serena demonstrated an appropriate quad set, yet an extensor lag was evident during her straight leg raises. Serena " demonstrates limited left knee range of motion compared to the right as she was educated to avoid placing a pillow underneath her left knee. DVT/infection control was reviewed with the patient as she was educated upon the importance of performing her home exercise program daily.     Plan  From a physical therapy perspective, the patient would benefit from: Skilled Rehab Services    Planned therapy interventions include: Therapeutic exercise, Therapeutic activities, Neuromuscular re-education, Manual therapy, ADLs/IADLs, Aquatic therapy, and Gait training.    Planned modalities to include: Biofeedback, Cryotherapy (cold pack), Electrical stimulation - passive/unattended, and Thermotherapy (hot pack).        Visit Frequency: 2 times Per Week for 12 Weeks.  Other/tapered frequency details: Taper down to 1x per week following 6 weeks of therapy.     This plan was discussed with Patient.   Discussion participants: Agreed Upon Plan of Care             Patient's spiritual, cultural, and educational needs considered and patient agreeable to plan of care and goals.           Goals:   Active       Ambulation/movement       Patient will demonstrate 10 normal squats without an increase in left knee symptoms.        Start:  04/02/25    Expected End:  05/14/25               Ambulation/movement       Patient will return to walking at least 3 times per week without left knee pain.        Start:  04/02/25    Expected End:  06/25/25            Patient will be able to ascend/descend at least 3 flights of stairs without left knee pain.        Start:  04/02/25    Expected End:  06/25/25               Functional outcome       Patient will show a significant change in FOTO patient-reported outcome tool to demonstrate subjective improvement (>56).        Start:  04/02/25    Expected End:  06/25/25               Pain       Patient will report pain of <6/10 demonstrating a reduction of overall pain upon return to work.        Start:   04/02/25    Expected End:  04/30/25               Pain       Patient will report pain of <3/10 demonstrating a reduction of overall pain when performing all daily and work duties.        Start:  04/02/25    Expected End:  06/25/25               Range of Motion       Patient will achieve left knee extension equal to the right.        Start:  04/02/25    Expected End:  04/30/25               Range of Motion       Patient will achieve left knee flexion of at least 115 degrees.        Start:  04/02/25    Expected End:  05/14/25               Straight leg raise       Patient will perform 20 straight leg raises without an extensor lag to demonstrate an improvement in her quad strength.        Start:  04/02/25    Expected End:  04/30/25                Blu Larsen, PT, DPT

## 2025-04-04 ENCOUNTER — CLINICAL SUPPORT (OUTPATIENT)
Dept: REHABILITATION | Facility: OTHER | Age: 55
End: 2025-04-04
Payer: COMMERCIAL

## 2025-04-04 DIAGNOSIS — G89.29 CHRONIC PAIN OF LEFT KNEE: Primary | ICD-10-CM

## 2025-04-04 DIAGNOSIS — M25.662 DECREASED RANGE OF MOTION (ROM) OF LEFT KNEE: ICD-10-CM

## 2025-04-04 DIAGNOSIS — R29.898 WEAKNESS OF LEFT LOWER EXTREMITY: ICD-10-CM

## 2025-04-04 DIAGNOSIS — M25.562 CHRONIC PAIN OF LEFT KNEE: Primary | ICD-10-CM

## 2025-04-04 PROCEDURE — 97530 THERAPEUTIC ACTIVITIES: CPT | Mod: PN

## 2025-04-04 PROCEDURE — 97140 MANUAL THERAPY 1/> REGIONS: CPT | Mod: PN

## 2025-04-04 PROCEDURE — 97112 NEUROMUSCULAR REEDUCATION: CPT | Mod: PN

## 2025-04-04 NOTE — PROGRESS NOTES
"  Outpatient Rehab    Physical Therapy Visit    Patient Name: Serena Yang  MRN: [id number]  YOB: 1970  Encounter Date: 4/4/2025    Therapy Diagnosis:   Encounter Diagnoses   Name Primary?    Chronic pain of left knee Yes    Decreased range of motion (ROM) of left knee     Weakness of left lower extremity      Physician: Alok Angela MD    Physician Orders: Eval and Treat  Medical Diagnosis: Other tear of medial meniscus, current injury, left knee, subsequent encounter  Pain in left knee    Visit # / Visits Authorized:  1 / 12  Insurance Authorization Period: 4/1/25 to 12/31/25   Date of Evaluation: 4/1/2025  Plan of Care Certification: 4/1/2025 to 7/1/25        Time In: 0950   Time Out: 1100  Total Time: 70   Total Billable Time: 40         Subjective   Serena states she has been working on her home exercises daily. Pain has been manageable as she has been elevating her leg to reduce swelling..         Objective            Treatment:  Manual Therapy  MT 1: Fat pad mobilizations  MT 2: Superior/inferior patella mobilizations grade III  MT 3: Passive ROM edge of table  Balance/Neuromuscular Re-Education  NMR 1: Prolonged passive stretch x 8' (3 lbs above the knee)  NMR 2: NMES to the R quad 10" on/10" off: Quad sets x 8'  NMR 3: NMES to the R quad 10" on/10" off: SAQs x 8'  NMR 4: NMES to the R quad 10" on/10" off: Straight leg raises supine x 5'  NMR 5: Prolonged passive stretch x 5' (3 lbs above the knee)  Therapeutic Activity  TA 1: Heel sides x 30  TA 2: Kumar step training x 20    Time Entry(in minutes):  Manual Therapy Time Entry: 12  Neuromuscular Re-Education Time Entry: 43  Therapeutic Activity Time Entry: 15    Assessment & Plan   Assessment: Serena presents to therapy with improved left knee range of motion compared to the initial evaluation. She responded well to a prolonged passive stretch. Kumar step training was introduced as verbal cues were provided for appropriate heel to " toe mechanics.       Patient will continue to benefit from skilled outpatient physical therapy to address the deficits listed in the problem list box on initial evaluation, provide pt/family education and to maximize pt's level of independence in the home and community environment.     Patient's spiritual, cultural, and educational needs considered and patient agreeable to plan of care and goals.           Plan: Address knee extension deficit while seeking to improve quad activation.    Goals:   Active       Ambulation/movement       Patient will demonstrate 10 normal squats without an increase in left knee symptoms.        Start:  04/02/25    Expected End:  05/14/25               Ambulation/movement       Patient will return to walking at least 3 times per week without left knee pain.        Start:  04/02/25    Expected End:  06/25/25            Patient will be able to ascend/descend at least 3 flights of stairs without left knee pain.        Start:  04/02/25    Expected End:  06/25/25               Functional outcome       Patient will show a significant change in FOTO patient-reported outcome tool to demonstrate subjective improvement (>56).        Start:  04/02/25    Expected End:  06/25/25               Pain       Patient will report pain of <6/10 demonstrating a reduction of overall pain upon return to work.        Start:  04/02/25    Expected End:  04/30/25               Pain       Patient will report pain of <3/10 demonstrating a reduction of overall pain when performing all daily and work duties.        Start:  04/02/25    Expected End:  06/25/25               Range of Motion       Patient will achieve left knee extension equal to the right.        Start:  04/02/25    Expected End:  04/30/25               Range of Motion       Patient will achieve left knee flexion of at least 115 degrees.        Start:  04/02/25    Expected End:  05/14/25               Straight leg raise       Patient will perform 20  straight leg raises without an extensor lag to demonstrate an improvement in her quad strength.        Start:  04/02/25    Expected End:  04/30/25                Blu Larsen, PT, DPT

## 2025-04-07 NOTE — PROGRESS NOTES
"Outpatient Rehab    Physical Therapy Visit    Patient Name: Serena Yang  MRN: [id number]  YOB: 1970  Encounter Date: 4/8/2025    Therapy Diagnosis:   Encounter Diagnoses   Name Primary?    Chronic pain of left knee Yes    Decreased range of motion (ROM) of left knee     Weakness of left lower extremity      Physician: Alok Angela MD    Physician Orders: Eval and Treat  Medical Diagnosis: Other tear of medial meniscus, current injury, left knee, subsequent encounter  Pain in left knee    Visit # / Visits Authorized:  2 / 12  Insurance Authorization Period: 4/1/25 to 12/31/25   Date of Evaluation: 4/1/2025  Plan of Care Certification: 4/1/2025 to 7/1/25        Time In: 1603   Time Out: 1700  Total Time: 57   Total Billable Time: 54         Subjective   Was taking a shower and her foot slipped forward. She didn't fall and was able to catch herself. It's feeling a little more sore and she's experiencing a burning under the knee cap.  Pain reported as 7/10. Location: Left knee    Objective            Treatment:  Manual Therapy  MT 1: Fat pad mobilizations  MT 2: Superior/inferior patella mobilizations grade III  Balance/Neuromuscular Re-Education  NMR 1: Prolonged passive stretch x 8' (3 lbs above the knee - No weight today)  NMR 2: NMES to the R quad 10" on/10" off: Quad sets x 8'  NMR 3: NMES to the R quad 10" on/10" off: SAQs x 8'  NMR 4: NMES to the R quad 10" on/10" off: Straight leg raises supine x 5'  NMR 5: Prolonged passive stretch x 5' (3 lbs above the knee - No weight today)  Therapeutic Activity  TA 1: Heel sides x 30    Time Entry (in minutes):  Manual Therapy Time Entry: 10  Neuromuscular Re-Education Time Entry: 38  Therapeutic Activity Time Entry: 6    Assessment & Plan   Assessment: Increased tenderness wih manual therapy and palaption today due to recent slip in the shower, but patient was able to tolerated exercises with mild discomfort. Removed weight with heel props and continued " with estim to improve quad activation. Encouraged patient to ice knee at home to help with swelling and inflamation. Will monitor symptoms and progress as tolerated.       Patient will continue to benefit from skilled outpatient physical therapy to address the deficits listed in the problem list box on initial evaluation, provide pt/family education and to maximize pt's level of independence in the home and community environment.     Patient's spiritual, cultural, and educational needs considered and patient agreeable to plan of care and goals.           Plan: Address knee extension deficit while seeking to improve quad activation.    Goals:   Active       Ambulation/movement       Patient will demonstrate 10 normal squats without an increase in left knee symptoms.        Start:  04/02/25    Expected End:  05/14/25               Ambulation/movement       Patient will return to walking at least 3 times per week without left knee pain.        Start:  04/02/25    Expected End:  06/25/25            Patient will be able to ascend/descend at least 3 flights of stairs without left knee pain.        Start:  04/02/25    Expected End:  06/25/25               Functional outcome       Patient will show a significant change in FOTO patient-reported outcome tool to demonstrate subjective improvement (>56).        Start:  04/02/25    Expected End:  06/25/25               Pain       Patient will report pain of <6/10 demonstrating a reduction of overall pain upon return to work.        Start:  04/02/25    Expected End:  04/30/25               Pain       Patient will report pain of <3/10 demonstrating a reduction of overall pain when performing all daily and work duties.        Start:  04/02/25    Expected End:  06/25/25               Range of Motion       Patient will achieve left knee extension equal to the right.        Start:  04/02/25    Expected End:  04/30/25               Range of Motion       Patient will achieve left knee  flexion of at least 115 degrees.        Start:  04/02/25    Expected End:  05/14/25               Straight leg raise       Patient will perform 20 straight leg raises without an extensor lag to demonstrate an improvement in her quad strength.        Start:  04/02/25    Expected End:  04/30/25                Ashley Sanon III, PTA

## 2025-04-08 ENCOUNTER — DOCUMENTATION ONLY (OUTPATIENT)
Dept: REHABILITATION | Facility: OTHER | Age: 55
End: 2025-04-08

## 2025-04-08 ENCOUNTER — PATIENT MESSAGE (OUTPATIENT)
Dept: ORTHOPEDICS | Facility: CLINIC | Age: 55
End: 2025-04-08
Payer: COMMERCIAL

## 2025-04-08 ENCOUNTER — CLINICAL SUPPORT (OUTPATIENT)
Dept: REHABILITATION | Facility: OTHER | Age: 55
End: 2025-04-08
Payer: COMMERCIAL

## 2025-04-08 DIAGNOSIS — M25.562 CHRONIC PAIN OF LEFT KNEE: Primary | ICD-10-CM

## 2025-04-08 DIAGNOSIS — R29.898 WEAKNESS OF LEFT LOWER EXTREMITY: ICD-10-CM

## 2025-04-08 DIAGNOSIS — G89.29 CHRONIC PAIN OF LEFT KNEE: Primary | ICD-10-CM

## 2025-04-08 DIAGNOSIS — M25.662 DECREASED RANGE OF MOTION (ROM) OF LEFT KNEE: ICD-10-CM

## 2025-04-08 PROCEDURE — 97112 NEUROMUSCULAR REEDUCATION: CPT | Mod: PN,CQ

## 2025-04-08 PROCEDURE — 97140 MANUAL THERAPY 1/> REGIONS: CPT | Mod: PN,CQ

## 2025-04-08 RX ORDER — LEVOTHYROXINE SODIUM 88 UG/1
88 TABLET ORAL
Qty: 90 TABLET | Refills: 0 | Status: SHIPPED | OUTPATIENT
Start: 2025-04-08

## 2025-04-08 NOTE — TELEPHONE ENCOUNTER
Provider Staff:  Action required for this patient    Requires appointment   Requires labs      Please see care gap opportunities below in Care Due Message.    Thanks!  [facility]     Appointments      Date Provider   Last Visit   4/30/2024 Janelle Zamora MD   Next Visit   Visit date not found Janelle Zamora MD     Refill Decision Note   Serena Yang  is requesting a refill authorization.  Brief Assessment and Rationale for Refill:  Approve     Medication Therapy Plan:         Comments:     Note composed:12:45 PM 04/08/2025

## 2025-04-08 NOTE — TELEPHONE ENCOUNTER
Care Due:                  Date            Visit Type   Department     Provider  --------------------------------------------------------------------------------                                EP -                              PRIMARY      [facility] PR[facility]  Last Visit: 04-      CARE ([facility])   CARE           Janelle Zamora  Next Visit: None Scheduled  None         None Found                                                            Last  Test          Frequency    Reason                     Performed    Due Date  --------------------------------------------------------------------------------    Office Visit  12 months..  ergocalciferol...........  04- 04-    HBA1C.......  6 months...  metFORMIN................  06- 12-    TSH.........  12 months..  levothyroxine............  06- 06-    Vitamin D...  12 months..  ergocalciferol...........  04- 04-    [employer] Embedded Care Due Messages. Reference number: [id number].   4/08/2025 12:38:05 PM CDT

## 2025-04-10 ENCOUNTER — CLINICAL SUPPORT (OUTPATIENT)
Dept: REHABILITATION | Facility: OTHER | Age: 55
End: 2025-04-10
Payer: COMMERCIAL

## 2025-04-10 DIAGNOSIS — R29.898 WEAKNESS OF LEFT LOWER EXTREMITY: ICD-10-CM

## 2025-04-10 DIAGNOSIS — M25.662 DECREASED RANGE OF MOTION (ROM) OF LEFT KNEE: ICD-10-CM

## 2025-04-10 DIAGNOSIS — G89.29 CHRONIC PAIN OF LEFT KNEE: Primary | ICD-10-CM

## 2025-04-10 DIAGNOSIS — M25.562 CHRONIC PAIN OF LEFT KNEE: Primary | ICD-10-CM

## 2025-04-10 PROCEDURE — 97140 MANUAL THERAPY 1/> REGIONS: CPT | Mod: PN

## 2025-04-10 PROCEDURE — 97112 NEUROMUSCULAR REEDUCATION: CPT | Mod: PN

## 2025-04-10 PROCEDURE — 97530 THERAPEUTIC ACTIVITIES: CPT | Mod: PN

## 2025-04-12 NOTE — PROGRESS NOTES
"  Outpatient Rehab    Physical Therapy Visit    Patient Name: Serena Yang  MRN: [id number]  YOB: 1970  Encounter Date: 4/10/2025    Therapy Diagnosis:   Encounter Diagnoses   Name Primary?    Chronic pain of left knee Yes    Decreased range of motion (ROM) of left knee     Weakness of left lower extremity      Physician: Alok Angela MD    Physician Orders: Eval and Treat  Medical Diagnosis: Other tear of medial meniscus, current injury, left knee, subsequent encounter  Pain in left knee    Visit # / Visits Authorized:  3 / 12  Insurance Authorization Period: 4/1/25 to 12/31/25   Date of Evaluation: 4/1/2025  Plan of Care Certification: 4/1/2025 to 7/1/25      Time In: 1605   Time Out: 1705  Total Time: 60   Total Billable Time: 30         Subjective   Serena states she took it easy yesterday and rested which has reduced her knee discomfort. Left anterior knee remains tender to touch..         Objective       Knee Range of Motion   Left Knee   Active (deg) Passive (deg) Pain   Flexion 108 110     Extension 0 2                     Treatment:  Manual Therapy  MT 1: Superior/inferior patella mobilizations grade II/III  MT 2: Fat pad mobilizations  MT 3: Hinge mobilization  MT 4: Passive knee range of motion at edge of table  Balance/Neuromuscular Re-Education  NMR 1: Passive prolonged stretch x 8' (3 lbs above and below the knee)  NMR 2: NMES to the left quad 10" on/10" off: Quad sets with towel roll under heel x 6'  NMR 3: NMES to the left quad 10" on/10" off: Short arc quads with towel roll unde calf x 6'  NMR 4: NMES to the left quad 10" on/10" off: Straight leg raises standing against table x 6'  NMR 5: Pin and hold stretch 10 x 10"  Therapeutic Activity  TA 1: Ambulation over hurdles x 8 rounds with cues on heel to toe mechanics    Time Entry(in minutes):  Manual Therapy Time Entry: 14  Neuromuscular Re-Education Time Entry: 38  Therapeutic Activity Time Entry: 8    Assessment & Plan "   Assessment: Serena presents with improved left knee range of motion compared to the previous week. She responds well to manual interventions followed by a prolonged passive stretch. Patient displays difficulty with performing a straight leg raise due to quad weakness. Consider standing TKE next visit.       Patient will continue to benefit from skilled outpatient physical therapy to address the deficits listed in the problem list box on initial evaluation, provide pt/family education and to maximize pt's level of independence in the home and community environment.     Patient's spiritual, cultural, and educational needs considered and patient agreeable to plan of care and goals.           Plan: Address remaining extension ROM deficit while seeking to improve quad strength.    Goals:   Active       Ambulation/movement       Patient will demonstrate 10 normal squats without an increase in left knee symptoms.        Start:  04/02/25    Expected End:  05/14/25               Ambulation/movement       Patient will return to walking at least 3 times per week without left knee pain.        Start:  04/02/25    Expected End:  06/25/25            Patient will be able to ascend/descend at least 3 flights of stairs without left knee pain.        Start:  04/02/25    Expected End:  06/25/25               Functional outcome       Patient will show a significant change in FOTO patient-reported outcome tool to demonstrate subjective improvement (>56).        Start:  04/02/25    Expected End:  06/25/25               Pain       Patient will report pain of <6/10 demonstrating a reduction of overall pain upon return to work.        Start:  04/02/25    Expected End:  04/30/25               Pain       Patient will report pain of <3/10 demonstrating a reduction of overall pain when performing all daily and work duties.        Start:  04/02/25    Expected End:  06/25/25               Range of Motion       Patient will achieve left knee  extension equal to the right.        Start:  04/02/25    Expected End:  04/30/25               Range of Motion       Patient will achieve left knee flexion of at least 115 degrees.        Start:  04/02/25    Expected End:  05/14/25               Straight leg raise       Patient will perform 20 straight leg raises without an extensor lag to demonstrate an improvement in her quad strength.        Start:  04/02/25    Expected End:  04/30/25                Blu Larsen PT, EMILIET

## 2025-04-15 ENCOUNTER — CLINICAL SUPPORT (OUTPATIENT)
Dept: REHABILITATION | Facility: OTHER | Age: 55
End: 2025-04-15
Payer: COMMERCIAL

## 2025-04-15 ENCOUNTER — PATIENT MESSAGE (OUTPATIENT)
Dept: ORTHOPEDICS | Facility: CLINIC | Age: 55
End: 2025-04-15
Payer: COMMERCIAL

## 2025-04-15 DIAGNOSIS — M25.662 DECREASED RANGE OF MOTION (ROM) OF LEFT KNEE: ICD-10-CM

## 2025-04-15 DIAGNOSIS — G89.29 CHRONIC PAIN OF LEFT KNEE: Primary | ICD-10-CM

## 2025-04-15 DIAGNOSIS — M25.562 CHRONIC PAIN OF LEFT KNEE: Primary | ICD-10-CM

## 2025-04-15 DIAGNOSIS — R29.898 WEAKNESS OF LEFT LOWER EXTREMITY: ICD-10-CM

## 2025-04-15 PROCEDURE — 97140 MANUAL THERAPY 1/> REGIONS: CPT | Mod: PN

## 2025-04-15 PROCEDURE — 97112 NEUROMUSCULAR REEDUCATION: CPT | Mod: PN

## 2025-04-15 PROCEDURE — 97530 THERAPEUTIC ACTIVITIES: CPT | Mod: PN

## 2025-04-16 NOTE — PROGRESS NOTES
"  Outpatient Rehab    Physical Therapy Visit    Patient Name: Serena Yang  MRN: [id number]  YOB: 1970  Encounter Date: 4/15/2025    Therapy Diagnosis:   Encounter Diagnoses   Name Primary?    Chronic pain of left knee Yes    Decreased range of motion (ROM) of left knee     Weakness of left lower extremity      Physician: Alok Angela MD    Physician Orders: Eval and Treat  Medical Diagnosis: Other tear of medial meniscus, current injury, left knee, subsequent encounter  Pain in left knee    Visit # / Visits Authorized:  4 / 12  Insurance Authorization Period: 4/1/25 to 12/31/25   Date of Evaluation: 4/1/2025  Plan of Care Certification: 4/1/2025 to 7/1/25      Time In: 1602   Time Out: 1702  Total Time: 60   Total Billable Time: 60         Subjective   Serena reports she is going to take an extra week off of work to make sure her knee is feeling fine..         Objective       Knee Range of Motion   Left Knee   Active (deg) Passive (deg) Pain   Flexion 105 120     Extension 0 2                     Treatment:  Therapeutic Exercise  TE 1: Stationary bike x 8'  Manual Therapy  MT 1: Fat pad mobilizations  MT 2: Superior patella mobilizations grade III  MT 3: L knee PROM edge of table  Balance/Neuromuscular Re-Education  NMR 1: Prolonged passive stretch x 7' (3 lbs above and below the knee)  NMR 2: NMES to the L quad 10" on /10" off: Quad sets x 8'  NMR 3: NMES to the L quad 10" on /10" off: Straight leg raises  supine x 5'  NMR 4: NMES to the L quad 10" on /10" off: Terminal knee extensions with GTB x 7'  NMR 5: Pin and stretch 10 x 10"  Therapeutic Activity  TA 1: Kumar step training x 5 rounds    Time Entry(in minutes):  Manual Therapy Time Entry: 10  Neuromuscular Re-Education Time Entry: 33  Therapeutic Activity Time Entry: 9  Therapeutic Exercise Time Entry: 8    Assessment & Plan   Assessment: Serena was introduced to the stationary bike given her improvements in knee range of motion. She " demonstrates improved gait mechanics, yet is still cautious. In addition, she was able to progress to straight leg raises supine. Given improved stability, consider progression to mini squats/TRX squats and leg press machine.       Patient will continue to benefit from skilled outpatient physical therapy to address the deficits listed in the problem list box on initial evaluation, provide pt/family education and to maximize pt's level of independence in the home and community environment.     Patient's spiritual, cultural, and educational needs considered and patient agreeable to plan of care and goals.           Plan: Progress quad strengthening (open chained knee extension, leg press) and begin to introduce squats.    Goals:   Active       Ambulation/movement       Patient will demonstrate 10 normal squats without an increase in left knee symptoms.        Start:  04/02/25    Expected End:  05/14/25               Ambulation/movement       Patient will return to walking at least 3 times per week without left knee pain.        Start:  04/02/25    Expected End:  06/25/25            Patient will be able to ascend/descend at least 3 flights of stairs without left knee pain.        Start:  04/02/25    Expected End:  06/25/25               Functional outcome       Patient will show a significant change in FOTO patient-reported outcome tool to demonstrate subjective improvement (>56).        Start:  04/02/25    Expected End:  06/25/25               Pain       Patient will report pain of <6/10 demonstrating a reduction of overall pain upon return to work.        Start:  04/02/25    Expected End:  04/30/25               Pain       Patient will report pain of <3/10 demonstrating a reduction of overall pain when performing all daily and work duties.        Start:  04/02/25    Expected End:  06/25/25               Range of Motion       Patient will achieve left knee extension equal to the right.        Start:  04/02/25     Expected End:  04/30/25               Range of Motion       Patient will achieve left knee flexion of at least 115 degrees.        Start:  04/02/25    Expected End:  05/14/25               Straight leg raise       Patient will perform 20 straight leg raises without an extensor lag to demonstrate an improvement in her quad strength.        Start:  04/02/25    Expected End:  04/30/25                Blu Larsen PT, DPT

## 2025-04-17 ENCOUNTER — CLINICAL SUPPORT (OUTPATIENT)
Dept: REHABILITATION | Facility: OTHER | Age: 55
End: 2025-04-17
Payer: COMMERCIAL

## 2025-04-17 DIAGNOSIS — R29.898 WEAKNESS OF LEFT LOWER EXTREMITY: ICD-10-CM

## 2025-04-17 DIAGNOSIS — G89.29 CHRONIC PAIN OF LEFT KNEE: Primary | ICD-10-CM

## 2025-04-17 DIAGNOSIS — M25.562 CHRONIC PAIN OF LEFT KNEE: Primary | ICD-10-CM

## 2025-04-17 DIAGNOSIS — M25.662 DECREASED RANGE OF MOTION (ROM) OF LEFT KNEE: ICD-10-CM

## 2025-04-17 PROCEDURE — 97112 NEUROMUSCULAR REEDUCATION: CPT | Mod: PN

## 2025-04-17 PROCEDURE — 97140 MANUAL THERAPY 1/> REGIONS: CPT | Mod: PN

## 2025-04-17 PROCEDURE — 97110 THERAPEUTIC EXERCISES: CPT | Mod: PN

## 2025-04-18 NOTE — PROGRESS NOTES
"  Outpatient Rehab    Physical Therapy Visit    Patient Name: Serena Yang  MRN: [id number]  YOB: 1970  Encounter Date: 4/17/2025    Therapy Diagnosis:   Encounter Diagnoses   Name Primary?    Chronic pain of left knee Yes    Decreased range of motion (ROM) of left knee     Weakness of left lower extremity      Physician: Alok Angela MD    Physician Orders: Eval and Treat  Medical Diagnosis: Other tear of medial meniscus, current injury, left knee, subsequent encounter  Pain in left knee    Visit # / Visits Authorized:  5 / 12  Insurance Authorization Period: 4/1/25 to 12/31/25   Date of Evaluation: 4/1/2025  Plan of Care Certification: 4/1/2025 to 7/1/25      Time In: 1607   Time Out: 1712  Total Time: 65   Total Billable Time: 65         Subjective   Serena reports she walked a little bit over the past few days including in the grocery store. She has been limiting her activity level to her tolerance..         Objective       Knee Range of Motion   Left Knee   Active (deg) Passive (deg) Pain   Flexion 116 120     Extension 0 0                     Treatment:  Therapeutic Exercise  TE 1: Stationary bike x 8'  Manual Therapy  MT 1: Superior patella mobilizations grade III  MT 2: Fat pad mobilizations  MT 3: Passive knee ROM edge of table  Balance/Neuromuscular Re-Education  NMR 1: Prolonged passive stretch x 8' (3 lbs above and below the knee)  NMR 2: NMES to the L quad 10" on/ 10" off: quad sets x 5'  NMR 3: NMES to the L quad 10" on/ 10" off: short arc quads x 5'  NMR 4: NMES to the L quad 10" on/ 10" off: straight leg raises supine x 5'  NMR 5: Long arc quads 2 x 10, 5 lbs  NMR 6: DL leg press 3 x 12, 2.5 bands  NMR 7: Pin and stretch 10 x 10"    Time Entry(in minutes):  Manual Therapy Time Entry: 16  Neuromuscular Re-Education Time Entry: 36  Therapeutic Exercise Time Entry: 8    Assessment & Plan   Assessment: Serena continues to make good improvements in her knee range of motion. In " addition, quad activation is progressing appropriately as she was able to perform a straight leg raise supine with minimal extensor lag. Serena demosntrates a cautious gait cycle, yet appears to be actively activating her quad during initial contact.       Patient will continue to benefit from skilled outpatient physical therapy to address the deficits listed in the problem list box on initial evaluation, provide pt/family education and to maximize pt's level of independence in the home and community environment.     Patient's spiritual, cultural, and educational needs considered and patient agreeable to plan of care and goals.           Plan: Address remaining knee ROM restrictions while seeking to normalize quad activation and gait cycle.    Goals:   Active       Ambulation/movement       Patient will demonstrate 10 normal squats without an increase in left knee symptoms.        Start:  04/02/25    Expected End:  05/14/25               Ambulation/movement       Patient will return to walking at least 3 times per week without left knee pain.        Start:  04/02/25    Expected End:  06/25/25            Patient will be able to ascend/descend at least 3 flights of stairs without left knee pain.        Start:  04/02/25    Expected End:  06/25/25               Functional outcome       Patient will show a significant change in FOTO patient-reported outcome tool to demonstrate subjective improvement (>56).        Start:  04/02/25    Expected End:  06/25/25               Pain       Patient will report pain of <6/10 demonstrating a reduction of overall pain upon return to work.        Start:  04/02/25    Expected End:  04/30/25               Pain       Patient will report pain of <3/10 demonstrating a reduction of overall pain when performing all daily and work duties.        Start:  04/02/25    Expected End:  06/25/25               Range of Motion       Patient will achieve left knee extension equal to the right.         Start:  04/02/25    Expected End:  04/30/25               Range of Motion       Patient will achieve left knee flexion of at least 115 degrees.        Start:  04/02/25    Expected End:  05/14/25               Straight leg raise       Patient will perform 20 straight leg raises without an extensor lag to demonstrate an improvement in her quad strength.        Start:  04/02/25    Expected End:  04/30/25                Blu Larsen PT, DPT

## 2025-04-19 DIAGNOSIS — J32.9 CHRONIC RHINOSINUSITIS: ICD-10-CM

## 2025-04-19 NOTE — TELEPHONE ENCOUNTER
No care due was identified.  [facility] Embedded Care Due Messages. Reference number: [id number].   4/19/2025 2:30:06 PM CDT

## 2025-04-21 RX ORDER — LORATADINE 10 MG/1
10 TABLET ORAL
Qty: 90 TABLET | Refills: 0 | Status: SHIPPED | OUTPATIENT
Start: 2025-04-21

## 2025-04-21 NOTE — TELEPHONE ENCOUNTER
Refill Routing Note   Medication(s) are not appropriate for processing by [facility] for the following reason(s):        Due for refill >6 months ago    [facility] action(s):  Defer             Appointments  past 12m or future 3m with PCP    Date Provider   Last Visit   4/30/2024 Janelle Zamora MD   Next Visit   Visit date not found Janelle Zamora MD   ED visits in past 90 days: 0        Note composed:1:46 PM 04/21/2025

## 2025-04-22 ENCOUNTER — OFFICE VISIT (OUTPATIENT)
Dept: ORTHOPEDICS | Facility: CLINIC | Age: 55
End: 2025-04-22
Payer: COMMERCIAL

## 2025-04-22 VITALS — BODY MASS INDEX: 36.99 KG/M2 | HEIGHT: 68 IN | WEIGHT: 244.06 LBS

## 2025-04-22 DIAGNOSIS — Z98.890 STATUS POST ARTHROSCOPY OF KNEE: ICD-10-CM

## 2025-04-22 DIAGNOSIS — M17.12 PRIMARY OSTEOARTHRITIS OF LEFT KNEE: Primary | ICD-10-CM

## 2025-04-22 PROCEDURE — 99024 POSTOP FOLLOW-UP VISIT: CPT | Mod: S$GLB,,, | Performed by: ORTHOPAEDIC SURGERY

## 2025-04-22 PROCEDURE — 99999 PR PBB SHADOW E&M-EST. PATIENT-LVL III: CPT | Mod: PBBFAC,,, | Performed by: ORTHOPAEDIC SURGERY

## 2025-04-22 PROCEDURE — 1159F MED LIST DOCD IN RCRD: CPT | Mod: CPTII,S$GLB,, | Performed by: ORTHOPAEDIC SURGERY

## 2025-04-22 NOTE — PROGRESS NOTES
[facility] [address]          Subjective:     Patient ID: Serena Yang is a 54 y.o. female.    Chief Complaint: Pain and Post-op Evaluation of the Left Knee    Patient is here for postoperative follow-up from left knee arthroscopy with partial meniscectomy.  Patient underwent surgery on 03/31/2025.    Patient's knee pain much improved with knee scope  Has been attending physical therapy  No new complaints  Patient pleased with surgical outcome         Past Medical History:   Diagnosis Date    Chronic bilateral low back pain without sciatica 10/18/2022    Hot flashes 08/08/2023    Nasal septal deviation 10/18/2022    Onychomycosis of great toe 10/18/2022    Snoring 04/30/2024    Enlarged tonsils          Past Surgical History:   Procedure Laterality Date    ARTHROSCOPY, KNEE, WITH MEDIAL OR LATERAL MENISCECTOMY Left 3/31/2025    Procedure: ARTHROSCOPY, KNEE, WITH MEDIAL OR LATERAL MENISCECTOMY;  Surgeon: Alok Angela MD;  Location: [facility] OR;  Service: Orthopedics;  Laterality: Left;    BREAST BIOPSY Bilateral 01/12/2023    core bx, benign        Current Outpatient Medications   Medication Instructions    diclofenac (VOLTAREN) 75 mg, Oral, 2 times daily    ergocalciferol (ERGOCALCIFEROL) 50,000 Units, Oral, Every 7 days    famotidine (PEPCID) 40 mg, Oral, Daily    ferrous sulfate (FEOSOL) 325 mg (65 mg iron) Tab tablet Take one tab po daily with Vit C 500mg daily    fluticasone propionate (FLONASE) 50 mcg/actuation nasal spray SHAKE LIQUID AND USE 2 SPRAYS(100 MCG) IN EACH NOSTRIL EVERY DAY    gabapentin (NEURONTIN) 300 mg, Oral, 3 times daily    levothyroxine (SYNTHROID) 88 mcg, Oral, Before breakfast    LIDOcaine (LIDODERM) 5 % 1 patch, Transdermal, Every 12 hours, Remove & Discard patch within 12 hours or as directed by MD    LIDOcaine (LIDODERM) 5 % 1 patch, Transdermal, Daily, Remove & Discard patch within 12 hours or as directed by MD    loratadine (CLARITIN) 10 mg, Oral    metFORMIN (GLUCOPHAGE) 500  mg, Oral, 2 times daily with meals    montelukast (SINGULAIR) 10 mg, Oral, Nightly    pantoprazole (PROTONIX) 20 mg, Oral, 2 times daily before meals    suzetrigine 50 mg Tab Take 2 as soon as you get home. The next morning start taking once every 12 hours.        Review of patient's allergies indicates:  No Known Allergies    Social History[1]    No family history on file.      Review of systems negative except for noted in HPI    Objective:   Physical Exam:     Left knee  Surgical incisions well healed  No effusion  Minimally Tender to palpation over medial joint line, Minimally tender to palpation lateral joint line  ROM 5-100  Stable anterior/posterior   Stable varus/valgus  No groin pain with rotation of hip  Grossly NVI distally    Imaging:   No new imaging obtained today      Assessment:        Serena Yang is a 54 y.o. female status post left knee medial meniscus tear with a partial meniscectomy on 03/31/2025    No diagnosis found.    Plan :  Okay to continue physical therapy or may work on exercises on her own at home  We also discussed that she may be a candidate for a procedure called Cartiheal in the future -- she is going to think about it  We will see her back in 3 months      No orders of the defined types were placed in this encounter.       Miguel Gusman MD   04/22/2025          Miguel Gusman MD  [employer] [facility] PGY-3         [1]   Social History  Socioeconomic History    Marital status:    Tobacco Use    Smoking status: Never    Smokeless tobacco: Never   Substance and Sexual Activity    Alcohol use: Not Currently     Comment: socially    Drug use: Never     Social Drivers of Health     Financial Resource Strain: Low Risk  (1/28/2025)    Received from [facility]    Overall Financial Resource Strain (CARDIA)     Difficulty of Paying Living Expenses: Not hard at all   Food Insecurity: No Food Insecurity (1/28/2025)    Received from [facility]    Hunger Vital Sign     Worried About Running  Out of Food in the Last Year: Never true     Ran Out of Food in the Last Year: Never true   Transportation Needs: No Transportation Needs (1/28/2025)    Received from [facility]    PRAPARE - Transportation     Lack of Transportation (Medical): No     Lack of Transportation (Non-Medical): No   Physical Activity: Insufficiently Active (1/28/2025)    Received from [facility]    Exercise Vital Sign     Days of Exercise per Week: 1 day     Minutes of Exercise per Session: 30 min   Stress: Patient Declined (1/28/2025)    Received from [facility]    [employer] - Occupational Stress Questionnaire     Feeling of Stress : Patient declined   Housing Stability: High Risk (2/12/2024)    Housing Stability Vital Sign     Unable to Pay for Housing in the Last Year: No     Number of Places Lived in the Last Year: 16     Unstable Housing in the Last Year: No

## 2025-04-24 ENCOUNTER — CLINICAL SUPPORT (OUTPATIENT)
Dept: REHABILITATION | Facility: OTHER | Age: 55
End: 2025-04-24
Payer: COMMERCIAL

## 2025-04-24 DIAGNOSIS — R29.898 WEAKNESS OF LEFT LOWER EXTREMITY: ICD-10-CM

## 2025-04-24 DIAGNOSIS — M25.562 CHRONIC PAIN OF LEFT KNEE: Primary | ICD-10-CM

## 2025-04-24 DIAGNOSIS — M25.662 DECREASED RANGE OF MOTION (ROM) OF LEFT KNEE: ICD-10-CM

## 2025-04-24 DIAGNOSIS — G89.29 CHRONIC PAIN OF LEFT KNEE: Primary | ICD-10-CM

## 2025-04-24 PROCEDURE — 97140 MANUAL THERAPY 1/> REGIONS: CPT | Mod: PN

## 2025-04-24 PROCEDURE — 97112 NEUROMUSCULAR REEDUCATION: CPT | Mod: PN

## 2025-04-24 PROCEDURE — 97110 THERAPEUTIC EXERCISES: CPT | Mod: PN

## 2025-04-25 NOTE — PROGRESS NOTES
"  Outpatient Rehab    Physical Therapy Visit    Patient Name: Serena Yang  MRN: [id number]  YOB: 1970  Encounter Date: 4/24/2025    Therapy Diagnosis:   Encounter Diagnoses   Name Primary?    Chronic pain of left knee Yes    Decreased range of motion (ROM) of left knee     Weakness of left lower extremity      Physician: Alok Angela MD    Physician Orders: Eval and Treat  Medical Diagnosis: Other tear of medial meniscus, current injury, left knee, subsequent encounter  Pain in left knee    Visit # / Visits Authorized:  6 / 12  Insurance Authorization Period: 4/1/25 to 12/31/25   Date of Evaluation: 4/1/2025  Plan of Care Certification: 4/1/2025 to 7/1/25      Time In: 1605   Time Out: 1705  Total Time: 60   Total Billable Time: 30         Subjective   Serena states her follow up appointment went well. She is scheduled to return to work on 4/28/25..         Objective            Treatment:  Therapeutic Exercise  TE 1: Stationary bike x 8'  Manual Therapy  MT 1: Fat pad mobilizations  MT 2: Superior patella mobilizations  MT 3: Knee extension assessment  Balance/Neuromuscular Re-Education  NMR 1: Prolonged passive stretch x 8' (3lbs above and below knee)  NMR 2: Quad sets with towel roll underneath heel x 25 with 10" holds --> include strap to no strap to no towel  NMR 3: Short arc quads x 25 with 5" holds  NMR 4: Straight leg raises 3 x 10  NMR 5: Standing terminal knee extensions x 20 with 10" holds  NMR 6: Step ups with focus on terminal extension x 12 reps    Time Entry(in minutes):  Manual Therapy Time Entry: 12  Neuromuscular Re-Education Time Entry: 40  Therapeutic Exercise Time Entry: 8    Assessment & Plan   Assessment: Serena demonstrates near full passive knee extension with minimal deficits in active knee extension. Gait remains cautious, yet minimal limp noted. Patient introduced to step ups. Consider return to leg press and introduction to squats during future visits.   "     Patient will continue to benefit from skilled outpatient physical therapy to address the deficits listed in the problem list box on initial evaluation, provide pt/family education and to maximize pt's level of independence in the home and community environment.     Patient's spiritual, cultural, and educational needs considered and patient agreeable to plan of care and goals.           Plan: Address remaining ROM restrictions while seeking to improve quad strength through functional movements.    Goals:   Active       Ambulation/movement       Patient will demonstrate 10 normal squats without an increase in left knee symptoms.        Start:  04/02/25    Expected End:  05/14/25               Ambulation/movement       Patient will return to walking at least 3 times per week without left knee pain.        Start:  04/02/25    Expected End:  06/25/25            Patient will be able to ascend/descend at least 3 flights of stairs without left knee pain.        Start:  04/02/25    Expected End:  06/25/25               Functional outcome       Patient will show a significant change in FOTO patient-reported outcome tool to demonstrate subjective improvement (>56).        Start:  04/02/25    Expected End:  06/25/25               Pain       Patient will report pain of <6/10 demonstrating a reduction of overall pain upon return to work.        Start:  04/02/25    Expected End:  04/30/25               Pain       Patient will report pain of <3/10 demonstrating a reduction of overall pain when performing all daily and work duties.        Start:  04/02/25    Expected End:  06/25/25               Range of Motion       Patient will achieve left knee extension equal to the right.        Start:  04/02/25    Expected End:  04/30/25               Range of Motion       Patient will achieve left knee flexion of at least 115 degrees.        Start:  04/02/25    Expected End:  05/14/25               Straight leg raise       Patient will  perform 20 straight leg raises without an extensor lag to demonstrate an improvement in her quad strength.        Start:  04/02/25    Expected End:  04/30/25                Blu Larsen PT, DPT

## 2025-04-29 ENCOUNTER — CLINICAL SUPPORT (OUTPATIENT)
Dept: REHABILITATION | Facility: OTHER | Age: 55
End: 2025-04-29
Payer: COMMERCIAL

## 2025-04-29 DIAGNOSIS — R29.898 WEAKNESS OF LEFT LOWER EXTREMITY: ICD-10-CM

## 2025-04-29 DIAGNOSIS — M25.562 CHRONIC PAIN OF LEFT KNEE: Primary | ICD-10-CM

## 2025-04-29 DIAGNOSIS — G89.29 CHRONIC PAIN OF LEFT KNEE: Primary | ICD-10-CM

## 2025-04-29 DIAGNOSIS — M25.662 DECREASED RANGE OF MOTION (ROM) OF LEFT KNEE: ICD-10-CM

## 2025-04-29 PROCEDURE — 97530 THERAPEUTIC ACTIVITIES: CPT | Mod: PN

## 2025-04-29 PROCEDURE — 97112 NEUROMUSCULAR REEDUCATION: CPT | Mod: PN

## 2025-04-29 PROCEDURE — 97140 MANUAL THERAPY 1/> REGIONS: CPT | Mod: PN

## 2025-05-01 ENCOUNTER — CLINICAL SUPPORT (OUTPATIENT)
Dept: REHABILITATION | Facility: OTHER | Age: 55
End: 2025-05-01
Payer: COMMERCIAL

## 2025-05-01 DIAGNOSIS — G89.29 CHRONIC PAIN OF LEFT KNEE: Primary | ICD-10-CM

## 2025-05-01 DIAGNOSIS — M25.662 DECREASED RANGE OF MOTION (ROM) OF LEFT KNEE: ICD-10-CM

## 2025-05-01 DIAGNOSIS — M25.562 CHRONIC PAIN OF LEFT KNEE: Primary | ICD-10-CM

## 2025-05-01 DIAGNOSIS — R29.898 WEAKNESS OF LEFT LOWER EXTREMITY: ICD-10-CM

## 2025-05-01 PROCEDURE — 97530 THERAPEUTIC ACTIVITIES: CPT | Mod: PN

## 2025-05-01 PROCEDURE — 97112 NEUROMUSCULAR REEDUCATION: CPT | Mod: PN

## 2025-05-01 PROCEDURE — 97140 MANUAL THERAPY 1/> REGIONS: CPT | Mod: PN

## 2025-05-02 DIAGNOSIS — E55.9 VITAMIN D DEFICIENCY: ICD-10-CM

## 2025-05-02 DIAGNOSIS — K21.9 GASTROESOPHAGEAL REFLUX DISEASE WITHOUT ESOPHAGITIS: ICD-10-CM

## 2025-05-02 RX ORDER — FAMOTIDINE 40 MG/1
40 TABLET, FILM COATED ORAL
Qty: 90 TABLET | Refills: 0 | Status: SHIPPED | OUTPATIENT
Start: 2025-05-02

## 2025-05-02 RX ORDER — ERGOCALCIFEROL 1.25 MG/1
50000 CAPSULE ORAL
Qty: 12 CAPSULE | Refills: 3 | Status: SHIPPED | OUTPATIENT
Start: 2025-05-02

## 2025-05-02 NOTE — TELEPHONE ENCOUNTER
No care due was identified.  [facility] Embedded Care Due Messages. Reference number: [id number].   5/02/2025 2:25:19 PM CDT

## 2025-05-02 NOTE — PROGRESS NOTES
"  Outpatient Rehab    Physical Therapy Visit    Patient Name: Serena Yang  MRN: [id number]  YOB: 1970  Encounter Date: 5/1/2025    Therapy Diagnosis:   Encounter Diagnoses   Name Primary?    Chronic pain of left knee Yes    Decreased range of motion (ROM) of left knee     Weakness of left lower extremity      Physician: Alok Angela MD    Physician Orders: Eval and Treat  Medical Diagnosis: Other tear of medial meniscus, current injury, left knee, subsequent encounter  Pain in left knee    Visit # / Visits Authorized:  8 / 12  Insurance Authorization Period: 4/1/25 to 12/31/25   Date of Evaluation: 4/1/2025  Plan of Care Certification: 4/1/2025 to 7/1/25     Time In: 1510   Time Out: 1630  Total Time: 80   Total Billable Time: 40       Subjective   Serena states she still has tenderness over the front of her knee. She has been able to tolerate return to work without an aggravation of symptoms..         Objective            Treatment:  Therapeutic Exercise  TE 1: Stationary bike x 8'  TE 2: Long arc quads 3 x 10, 10 lbs (mild pain)  Manual Therapy  MT 1: Fat pad mobilizations  MT 2: Superior patella mobilizations  MT 3: Anterior/lateral TF joint mobilizations grade III  Balance/Neuromuscular Re-Education  NMR 1: Quad sets with strap assistance x 20 with 5" holds  NMR 2: Quad sets at edge of table x 20 with 5" holds  NMR 3: Straight leg raises supine 3 x 10  NMR 4: Prolonged passive stretch at end of session, 3 lbs above and below knee x 22' (only place for 8' next session)  Therapeutic Activity  TA 1: Step ups on 6" step 2 x 8, BTB  TA 2: Staggered stance leg press 4 x 10, 4.5 bands    Time Entry(in minutes):  Manual Therapy Time Entry: 12  Neuromuscular Re-Education Time Entry: 37  Therapeutic Activity Time Entry: 16  Therapeutic Exercise Time Entry: 15    Assessment & Plan   Assessment: Serena demonstrates mild limitations in her knee extension. She continued with quad activation and " strengthening. Prolonged passive stretch performed at end of session. Consider reverse treadmill walking next visit at a slow pace (0.5 mph).       Patient will continue to benefit from skilled outpatient physical therapy to address the deficits listed in the problem list box on initial evaluation, provide pt/family education and to maximize pt's level of independence in the home and community environment.     Patient's spiritual, cultural, and educational needs considered and patient agreeable to plan of care and goals.           Plan: Address remaining extension restrictions while seeking to improve quad strength in an effort to reduce anterior knee discomfort.    Goals:   Active       Ambulation/movement       Patient will demonstrate 10 normal squats without an increase in left knee symptoms.        Start:  04/02/25    Expected End:  05/14/25               Ambulation/movement       Patient will return to walking at least 3 times per week without left knee pain.        Start:  04/02/25    Expected End:  06/25/25            Patient will be able to ascend/descend at least 3 flights of stairs without left knee pain.        Start:  04/02/25    Expected End:  06/25/25               Functional outcome       Patient will show a significant change in FOTO patient-reported outcome tool to demonstrate subjective improvement (>56).        Start:  04/02/25    Expected End:  06/25/25               Pain       Patient will report pain of <6/10 demonstrating a reduction of overall pain upon return to work.        Start:  04/02/25    Expected End:  04/30/25               Pain       Patient will report pain of <3/10 demonstrating a reduction of overall pain when performing all daily and work duties.        Start:  04/02/25    Expected End:  06/25/25               Range of Motion       Patient will achieve left knee extension equal to the right.        Start:  04/02/25    Expected End:  04/30/25               Range of Motion        Patient will achieve left knee flexion of at least 115 degrees.        Start:  04/02/25    Expected End:  05/14/25              Resolved       Straight leg raise       Patient will perform 20 straight leg raises without an extensor lag to demonstrate an improvement in her quad strength.  (Met)       Start:  04/02/25    Expected End:  04/30/25    Resolved:  05/02/25             Blu Larsen PT, DPT

## 2025-05-02 NOTE — TELEPHONE ENCOUNTER
No care due was identified.  [facility] Embedded Care Due Messages. Reference number: [id number].   5/02/2025 3:43:29 AM CDT

## 2025-05-02 NOTE — TELEPHONE ENCOUNTER
Refill Decision Note   Serena Yang  is requesting a refill authorization.    Brief Assessment and Rationale for Refill:  Approve       Medication Therapy Plan:  Overridden by Janelle Zamora MD on Nov 6, 2024 8:24 PM; Acceptable to use H2RA and PPI together per [facility] protocol      Alert overridden per protocol: Yes   Comments:     Note composed:4:19 PM 05/02/2025

## 2025-05-05 DIAGNOSIS — D50.0 IRON DEFICIENCY ANEMIA DUE TO CHRONIC BLOOD LOSS: ICD-10-CM

## 2025-05-05 DIAGNOSIS — J32.9 CHRONIC RHINOSINUSITIS: ICD-10-CM

## 2025-05-05 DIAGNOSIS — R73.03 PREDIABETES: ICD-10-CM

## 2025-05-05 RX ORDER — FLUTICASONE PROPIONATE 50 MCG
1 SPRAY, SUSPENSION (ML) NASAL 2 TIMES DAILY
Qty: 48 G | Refills: 3 | Status: SHIPPED | OUTPATIENT
Start: 2025-05-05

## 2025-05-05 RX ORDER — METFORMIN HYDROCHLORIDE 500 MG/1
500 TABLET ORAL 2 TIMES DAILY WITH MEALS
Qty: 180 TABLET | Refills: 3 | Status: SHIPPED | OUTPATIENT
Start: 2025-05-05 | End: 2026-05-05

## 2025-05-05 RX ORDER — LEVOTHYROXINE SODIUM 88 UG/1
88 TABLET ORAL
Qty: 90 TABLET | Refills: 0 | Status: SHIPPED | OUTPATIENT
Start: 2025-05-05

## 2025-05-05 RX ORDER — FERROUS SULFATE 325(65) MG
TABLET ORAL
Qty: 90 TABLET | Refills: 3 | Status: SHIPPED | OUTPATIENT
Start: 2025-05-05

## 2025-05-05 NOTE — TELEPHONE ENCOUNTER
No care due was identified.  [facility] Embedded Care Due Messages. Reference number: [id number].   5/05/2025 2:57:09 PM CDT

## 2025-05-15 ENCOUNTER — CLINICAL SUPPORT (OUTPATIENT)
Dept: REHABILITATION | Facility: OTHER | Age: 55
End: 2025-05-15
Payer: COMMERCIAL

## 2025-05-15 DIAGNOSIS — M25.562 CHRONIC PAIN OF LEFT KNEE: Primary | ICD-10-CM

## 2025-05-15 DIAGNOSIS — G89.29 CHRONIC PAIN OF LEFT KNEE: Primary | ICD-10-CM

## 2025-05-15 DIAGNOSIS — M25.662 DECREASED RANGE OF MOTION (ROM) OF LEFT KNEE: ICD-10-CM

## 2025-05-15 DIAGNOSIS — R29.898 WEAKNESS OF LEFT LOWER EXTREMITY: ICD-10-CM

## 2025-05-15 PROCEDURE — 97140 MANUAL THERAPY 1/> REGIONS: CPT | Mod: PN

## 2025-05-15 PROCEDURE — 97112 NEUROMUSCULAR REEDUCATION: CPT | Mod: PN

## 2025-05-15 PROCEDURE — 97530 THERAPEUTIC ACTIVITIES: CPT | Mod: PN

## 2025-05-15 NOTE — PROGRESS NOTES
"  Outpatient Rehab    Physical Therapy Visit    Patient Name: Serena Yang  MRN: [id number]  YOB: 1970  Encounter Date: 5/15/2025    Therapy Diagnosis:   Encounter Diagnoses   Name Primary?    Chronic pain of left knee Yes    Decreased range of motion (ROM) of left knee     Weakness of left lower extremity      Physician: Alok Angela MD    Physician Orders: Eval and Treat  Medical Diagnosis: Other tear of medial meniscus, current injury, left knee, subsequent encounter  Pain in left knee    Visit # / Visits Authorized:  9 / 12  Insurance Authorization Period: 4/1/2025 to 12/31/2025  Date of Evaluation: 3/18/2025  Plan of Care Certification: 4/2/2025 to 7/1/2025     Time In: 1740   Time Out: 1845  Total Time (in minutes): 65   Total Billable Time (in minutes): 60      Precautions:       Subjective   Serena states her knee was sore last weekend after standing for an extended period of time..         Objective            Treatment:  Therapeutic Exercise  TE 1: Stationary bike x 8'  Manual Therapy  MT 1: Fat pad mobilizations  MT 2: Superior/inferior patella mobilizations grade III  MT 3: Passive range of motion at edge of table  Balance/Neuromuscular Re-Education  NMR 1: Pin and hold stretch x 20 with 10" holds  NMR 2: Quad sets at edge of table x 30 with 5" holds  NMR 3: Straight leg raises supine 4 x 10  NMR 4: DL leg press 3 x 10, 4 bands  NMR 5: SL leg press 4 x 8, 2 bands  NMR 6: DL knee extensions 4 x 8-10, 25 lbs  Therapeutic Activity  TA 1: Reverse treadmill walking x 5'  TA 2: Forward walking 4 x 60 feet    Time Entry(in minutes):  Manual Therapy Time Entry: 12  Neuromuscular Re-Education Time Entry: 30  Therapeutic Activity Time Entry: 10  Therapeutic Exercise Time Entry: 8    Assessment & Plan   Assessment: Serena returns to physical therapy demonstrating a mild defiict in knee extension, yet she presents with appropriate knee flexion. Session focused on quad strengthening and " normalizing the patient's gait cycle. Patient will attend physical therapy 1x per week moving forward.       Patient will continue to benefit from skilled outpatient physical therapy to address the deficits listed in the problem list box on initial evaluation, provide pt/family education and to maximize pt's level of independence in the home and community environment.     Patient's spiritual, cultural, and educational needs considered and patient agreeable to plan of care and goals.           Plan: Address remaining knee extension and quad strength deficits.    Goals:   Active       Ambulation/movement       Patient will demonstrate 10 normal squats without an increase in left knee symptoms.  (Progressing)       Start:  04/02/25    Expected End:  05/14/25               Ambulation/movement       Patient will return to walking at least 3 times per week without left knee pain.        Start:  04/02/25    Expected End:  06/25/25            Patient will be able to ascend/descend at least 3 flights of stairs without left knee pain.        Start:  04/02/25    Expected End:  06/25/25               Functional outcome       Patient will show a significant change in FOTO patient-reported outcome tool to demonstrate subjective improvement (>56).        Start:  04/02/25    Expected End:  06/25/25               Pain       Patient will report pain of <3/10 demonstrating a reduction of overall pain when performing all daily and work duties.        Start:  04/02/25    Expected End:  06/25/25               Range of Motion       Patient will achieve left knee extension equal to the right.  (Progressing)       Start:  04/02/25    Expected End:  04/30/25              Resolved       Pain       Patient will report pain of <6/10 demonstrating a reduction of overall pain upon return to work.  (Met)       Start:  04/02/25    Expected End:  04/30/25    Resolved:  05/15/25            Range of Motion       Patient will achieve left knee flexion  of at least 115 degrees.  (Met)       Start:  04/02/25    Expected End:  05/14/25    Resolved:  05/15/25            Straight leg raise       Patient will perform 20 straight leg raises without an extensor lag to demonstrate an improvement in her quad strength.  (Met)       Start:  04/02/25    Expected End:  04/30/25    Resolved:  05/02/25             Blu Larsen PT, DPT

## 2025-06-10 ENCOUNTER — RESULTS FOLLOW-UP (OUTPATIENT)
Dept: PRIMARY CARE CLINIC | Facility: CLINIC | Age: 55
End: 2025-06-10

## 2025-06-10 ENCOUNTER — APPOINTMENT (OUTPATIENT)
Dept: LAB | Facility: HOSPITAL | Age: 55
End: 2025-06-10
Attending: INTERNAL MEDICINE
Payer: COMMERCIAL

## 2025-06-10 ENCOUNTER — OFFICE VISIT (OUTPATIENT)
Dept: PRIMARY CARE CLINIC | Facility: CLINIC | Age: 55
End: 2025-06-10
Payer: COMMERCIAL

## 2025-06-10 VITALS
HEART RATE: 91 BPM | DIASTOLIC BLOOD PRESSURE: 76 MMHG | BODY MASS INDEX: 36.89 KG/M2 | WEIGHT: 243.38 LBS | RESPIRATION RATE: 14 BRPM | SYSTOLIC BLOOD PRESSURE: 132 MMHG | HEIGHT: 68 IN

## 2025-06-10 DIAGNOSIS — R06.81 WITNESSED EPISODE OF APNEA: ICD-10-CM

## 2025-06-10 DIAGNOSIS — K11.8 MASS OF LEFT PAROTID GLAND: ICD-10-CM

## 2025-06-10 DIAGNOSIS — G47.19 DAYTIME HYPERSOMNOLENCE: ICD-10-CM

## 2025-06-10 DIAGNOSIS — R73.03 PREDIABETES: ICD-10-CM

## 2025-06-10 DIAGNOSIS — E66.812 CLASS 2 OBESITY DUE TO EXCESS CALORIES WITH BODY MASS INDEX (BMI) OF 37.0 TO 37.9 IN ADULT, UNSPECIFIED WHETHER SERIOUS COMORBIDITY PRESENT: ICD-10-CM

## 2025-06-10 DIAGNOSIS — D21.9 FIBROIDS: ICD-10-CM

## 2025-06-10 DIAGNOSIS — D50.0 IRON DEFICIENCY ANEMIA DUE TO CHRONIC BLOOD LOSS: ICD-10-CM

## 2025-06-10 DIAGNOSIS — K21.9 GASTROESOPHAGEAL REFLUX DISEASE WITHOUT ESOPHAGITIS: ICD-10-CM

## 2025-06-10 DIAGNOSIS — K59.01 SLOW TRANSIT CONSTIPATION: ICD-10-CM

## 2025-06-10 DIAGNOSIS — F51.02 ADJUSTMENT INSOMNIA: ICD-10-CM

## 2025-06-10 DIAGNOSIS — Z00.00 PREVENTATIVE HEALTH CARE: Primary | ICD-10-CM

## 2025-06-10 DIAGNOSIS — J32.9 CHRONIC RHINOSINUSITIS: ICD-10-CM

## 2025-06-10 DIAGNOSIS — M75.81 RIGHT ROTATOR CUFF TENDINITIS: ICD-10-CM

## 2025-06-10 DIAGNOSIS — E89.0 H/O PARTIAL THYROIDECTOMY: ICD-10-CM

## 2025-06-10 DIAGNOSIS — E03.9 HYPOTHYROIDISM, UNSPECIFIED TYPE: ICD-10-CM

## 2025-06-10 DIAGNOSIS — E66.09 CLASS 2 OBESITY DUE TO EXCESS CALORIES WITH BODY MASS INDEX (BMI) OF 37.0 TO 37.9 IN ADULT, UNSPECIFIED WHETHER SERIOUS COMORBIDITY PRESENT: ICD-10-CM

## 2025-06-10 DIAGNOSIS — E55.9 VITAMIN D DEFICIENCY: ICD-10-CM

## 2025-06-10 DIAGNOSIS — G62.9 NEUROPATHY: ICD-10-CM

## 2025-06-10 PROBLEM — N94.6 DYSMENORRHEA: Status: RESOLVED | Noted: 2022-10-18 | Resolved: 2025-06-10

## 2025-06-10 PROBLEM — M25.562 CHRONIC PAIN OF LEFT KNEE: Status: RESOLVED | Noted: 2024-05-07 | Resolved: 2025-06-10

## 2025-06-10 PROBLEM — G89.29 CHRONIC PAIN OF LEFT KNEE: Status: RESOLVED | Noted: 2024-05-07 | Resolved: 2025-06-10

## 2025-06-10 PROBLEM — Z98.890 STATUS POST ARTHROSCOPY OF KNEE: Status: RESOLVED | Noted: 2025-04-22 | Resolved: 2025-06-10

## 2025-06-10 PROBLEM — M25.662 DECREASED RANGE OF MOTION (ROM) OF LEFT KNEE: Status: RESOLVED | Noted: 2024-05-07 | Resolved: 2025-06-10

## 2025-06-10 PROBLEM — M17.12 PRIMARY OSTEOARTHRITIS OF LEFT KNEE: Status: RESOLVED | Noted: 2025-02-18 | Resolved: 2025-06-10

## 2025-06-10 PROBLEM — M22.2X2 PATELLOFEMORAL SYNDROME, BILATERAL: Status: RESOLVED | Noted: 2024-04-30 | Resolved: 2025-06-10

## 2025-06-10 PROBLEM — R29.898 WEAKNESS OF LEFT LOWER EXTREMITY: Status: RESOLVED | Noted: 2025-04-02 | Resolved: 2025-06-10

## 2025-06-10 PROBLEM — S83.242D TEAR OF MEDIAL MENISCUS OF LEFT KNEE, SUBSEQUENT ENCOUNTER: Status: RESOLVED | Noted: 2025-03-18 | Resolved: 2025-06-10

## 2025-06-10 PROBLEM — M22.2X1 PATELLOFEMORAL SYNDROME, BILATERAL: Status: RESOLVED | Noted: 2024-04-30 | Resolved: 2025-06-10

## 2025-06-10 PROCEDURE — 99999 PR PBB SHADOW E&M-EST. PATIENT-LVL IV: CPT | Mod: PBBFAC,,, | Performed by: INTERNAL MEDICINE

## 2025-06-10 RX ORDER — MONTELUKAST SODIUM 10 MG/1
10 TABLET ORAL NIGHTLY
Qty: 90 TABLET | Refills: 3 | Status: SHIPPED | OUTPATIENT
Start: 2025-06-10

## 2025-06-10 RX ORDER — ERGOCALCIFEROL 1.25 MG/1
50000 CAPSULE ORAL
Qty: 12 CAPSULE | Refills: 3 | Status: SHIPPED | OUTPATIENT
Start: 2025-06-10

## 2025-06-10 RX ORDER — FLUTICASONE PROPIONATE 50 MCG
1 SPRAY, SUSPENSION (ML) NASAL 2 TIMES DAILY
Qty: 48 G | Refills: 3 | Status: SHIPPED | OUTPATIENT
Start: 2025-06-10

## 2025-06-10 RX ORDER — GABAPENTIN 300 MG/1
300 CAPSULE ORAL 2 TIMES DAILY
Qty: 180 CAPSULE | Refills: 3 | Status: SHIPPED | OUTPATIENT
Start: 2025-06-10

## 2025-06-10 RX ORDER — METFORMIN HYDROCHLORIDE 500 MG/1
500 TABLET ORAL 2 TIMES DAILY WITH MEALS
Qty: 180 TABLET | Refills: 3 | Status: SHIPPED | OUTPATIENT
Start: 2025-06-10 | End: 2026-06-10

## 2025-06-10 RX ORDER — FAMOTIDINE 40 MG/1
40 TABLET, FILM COATED ORAL DAILY
Qty: 90 TABLET | Refills: 3 | Status: SHIPPED | OUTPATIENT
Start: 2025-06-10

## 2025-06-10 RX ORDER — LIDOCAINE 50 MG/G
1 PATCH TOPICAL DAILY
Qty: 30 PATCH | Refills: 3 | Status: SHIPPED | OUTPATIENT
Start: 2025-06-10

## 2025-06-10 RX ORDER — LEVOTHYROXINE SODIUM 88 UG/1
88 TABLET ORAL
Qty: 90 TABLET | Refills: 3 | Status: SHIPPED | OUTPATIENT
Start: 2025-06-10

## 2025-06-10 RX ORDER — LORATADINE 10 MG/1
10 TABLET ORAL DAILY
Qty: 90 TABLET | Refills: 3 | Status: SHIPPED | OUTPATIENT
Start: 2025-06-10

## 2025-06-10 NOTE — ASSESSMENT & PLAN NOTE
Fiber gummies 2-3 per day   For Acute Constipation:  -Take Fiber gummies 2 daily   -Start Mag citrate 8oz bottle with Sennakot plus up to twice per day for severe constipation  Miralax 17gm bid in 8oz prune juice    Maintenance:   --hydrate well: drink 8-10 glasses of water/day  Start daily fiber (25 gm per day ideally).  Take fiber gummies 2 per day  -Miralax 17gm in 8oz liquid daily  - Take 1 tsp of fiber powder (psyllium or other sugar-free powder).  Mix in 8 oz of water.  Take x 3-5 days.  Then, increase fiber by 1 tsp every 3-5 days until stool is easy to pass.  Stop and continue at that dose.   Do not exceed 6 tsps/day.  May also use over the counter stool softener 1-2 x/day      AVOID laxatives.  --controlling constipation can keep hemorrhoids from flaring    Foods high in fiber:  Fruit: jasmin raspberries, pear, apple with skin  Veggies: jasmin boiled green peas, broccoli, brussel sprouts, turnip greens, baked potatoes with skin  Grains: whole wheat spaghetti, cooked pearled barley, bran flakes, cooked quinoa, instant oatmeal, oat bran muffin  Nuts/Legumes: boiled split peas, boiled lentils, boiled black beans, backed canned beans, jan seeds    Controlling constipation may help bladder urgency/leakage and fiber may better control cholesterol and blood glucose.

## 2025-06-10 NOTE — ASSESSMENT & PLAN NOTE
Stable on Pepcid 40mg daily   Initiate GERD precautions ie lose weight, avoid eating 3 hours prior to bed, minimize caffeine, alcohol, tobacco, spicy, greasy, fatty foods; avoid peppermint chocolates, citrus and other acidic foods. Increase exercise to help with digestion and avoid lying down immediately after eating.  Elevate head of bed if GERD awakens pt from sleep.  Eat 6 small snacks rather than 3 large meals.

## 2025-06-10 NOTE — PROGRESS NOTES
[facility]/Pediatric[facility] Progress Note      Chief Complaint     Annual Exam       Subjective:      History of Present Illness:  Serena Yang is a 55 y.o. female     S/p left knee torn meniscus s/p left scope in 3/2025 and may need cartilage implant and eventual TKR as per Dr. Angela   -uses Aspercreme, biofreeze, lidoderm 5%patches  voltaren gel     Right Rotator cuff pain: still needs to see Dr. Carrera     Currently in virtual school for [employer] due in May 2026    Hypothyroidism: euthryoid on levothyroxine 88mcg daiiy    Insomnia: has hard time falling and staying asleep     Vitamin D def'y: takes vit D 50,000 IU each week     AR with right mucous retention cyst:  uses flonase; singulair 10mg qhs; claritin 10mg daily  -did not get CT maxillary sinus with contrast since denied by insurance     Mass left parotid gland: never got FNA by Dr. Horan or CT due to insurance denial; has appt next week    Bilateral enlarged tonsils with snoring with daytime hypersomnolence - Dr. Horan wants her to get a Sleep Study before insurance approves tonsillectomy  -still needs to get Sleep Study   -her hubby has witnessed her apneic episodes and must awaken her from sleep      Gingival hyperplasia: seeing dentist today     PreDM:  taking Metformin 500mg daily but bid 3 times per week     Constipation: improved with metamucil with green smoothie in am; drinking water and green veggies     Right hand neuropathy post right thyroidectomy: takes gabapentin 300mg bid     Iron deficiency with resolved anemia: now with less heavy  menstrual cycle with improved menstrual cramps lasts 3-4 days: with occasional hot flashes; now missing cycles occasionally; takes iron 2-3 times per week       GERD: doing well on pepcid 40mg daily       Fibroids/dysmenorrhea: with 3 fibroids based on TVS 11/2023 still having issues with blood clots and pain requiring Naproxen; f/u appt with Dr. Kendall     : oldest of 3 girls; no tobacco, drugs,  alcohol; lives in [facility] with [employer]; has 34 y/o son who lives in [place]; finishing [employer] in May 2026  FH: mom endometrial cancer, epilepsy post head trauma; CHF, HTN: sisters HTN: dad DM II, HTN 81 y/o in [place]       Past Medical History:  Past Medical History:   Diagnosis Date    Chronic bilateral low back pain without sciatica 10/18/2022    Chronic pain of left knee 05/07/2024    Decreased range of motion (ROM) of left knee 05/07/2024    Dysmenorrhea 10/18/2022    Hot flashes 08/08/2023    Nasal septal deviation 10/18/2022    Onychomycosis of great toe 10/18/2022    Patellofemoral syndrome, bilateral 04/30/2024    Saw Dr. Angela for same issue and had MRI of right knee and dx'ed with patellofemoral syndrome and had PT with improvement.      Primary osteoarthritis of left knee 02/18/2025    Snoring 04/30/2024    Enlarged tonsils      Status post arthroscopy of knee 04/22/2025    Tear of medial meniscus of left knee, subsequent encounter 03/18/2025    Weakness of left lower extremity 04/02/2025       Past Surgical History:  Past Surgical History:   Procedure Laterality Date    ARTHROSCOPY, KNEE, WITH MEDIAL OR LATERAL MENISCECTOMY Left 3/31/2025    Procedure: ARTHROSCOPY, KNEE, WITH MEDIAL OR LATERAL MENISCECTOMY;  Surgeon: Alok Angela MD;  Location: [facility];  Service: Orthopedics;  Laterality: Left;    BREAST BIOPSY Bilateral 01/12/2023    core bx, benign       Allergies:  Review of patient's allergies indicates:  No Known Allergies    Home Medications:  Current Outpatient Medications   Medication Sig Dispense Refill    ferrous sulfate (FEOSOL) 325 mg (65 mg iron) Tab tablet Take one tab po daily with Vit C 500mg daily 90 tablet 3    ergocalciferol (ERGOCALCIFEROL) 50,000 unit Cap Take 1 capsule (50,000 Units total) by mouth every 7 days. 12 capsule 3    famotidine (PEPCID) 40 MG tablet Take 1 tablet (40 mg total) by mouth once daily. 90 tablet 3    fluticasone propionate (FLONASE) 50 mcg/actuation nasal spray 1  spray (50 mcg total) by Each Nostril route 2 (two) times a day. 48 g 3    gabapentin (NEURONTIN) 300 MG capsule Take 1 capsule (300 mg total) by mouth 2 (two) times daily. 180 capsule 3    levothyroxine (SYNTHROID) 88 MCG tablet Take 1 tablet (88 mcg total) by mouth before breakfast. 90 tablet 3    LIDOcaine (LIDODERM) 5 % Place 1 patch onto the skin once daily. Remove & Discard patch within 12 hours or as directed by MD 30 patch 3    loratadine (CLARITIN) 10 mg tablet Take 1 tablet (10 mg total) by mouth once daily. 90 tablet 3    metFORMIN (GLUCOPHAGE) 500 MG tablet Take 1 tablet (500 mg total) by mouth 2 (two) times daily with meals. 180 tablet 3    montelukast (SINGULAIR) 10 mg tablet Take 1 tablet (10 mg total) by mouth every evening. 90 tablet 3     No current facility-administered medications for this visit.        Family History:  No family history on file.    Social History:  Social History     Tobacco Use    Smoking status: Never    Smokeless tobacco: Never   Substance Use Topics    Alcohol use: Not Currently     Comment: socially    Drug use: Never       Review of Systems:  Pertinent positives and negatives listed in HPI. All other systems are reviewed and are negative.    Health Maintaince :   Health Maintenance Topics with due status: Not Due       Topic Last Completion Date    Influenza Vaccine 12/06/2010    Colorectal Cancer Screening 01/26/2021    Cervical Cancer Screening 04/20/2021    Hemoglobin A1c (Diabetic Prevention Screening) 06/10/2025    Lipid Panel 06/10/2025    RSV Vaccine (Age 60+ and Pregnant patients) Not Due           Eye: UTD  Dental: UTD today     Immunizations:   Tdap: n/a.  Influenza: needs.  Pneumovax: n/a  Shingrex x 2: needs  COVID: needs  Hepatitis C:   Cancer Screening:  PAP: UTD 4/2024  Mammogram: UTD 1/2025 - repeat in July 2025  Colonoscopy: 1/2021 - needs 1/2026  DEXA:  n/a  LDCT: n/a    The 10-year ASCVD risk score (Joe AGUILAR, et al., 2019) is: 3.3%    Values used to  "calculate the score:      Age: 55 years      Sex: Female      Is Non- : Yes      Diabetic: No      Tobacco smoker: No      Systolic Blood Pressure: 132 mmHg      Is BP treated: No      HDL Cholesterol: 64 mg/dL      Total Cholesterol: 240 mg/dL      Objective:   /76 (BP Location: Left arm, Patient Position: Sitting)   Pulse 91   Resp 14   Ht 5' 8" (1.727 m)   Wt 110.4 kg (243 lb 6.2 oz)   LMP 05/17/2025   BMI 37.01 kg/m²      Body mass index is 37.01 kg/m².       Physical Examination:  General: Alert and awake in no apparent distress  HEENT: Normocephalic and atraumatic; Tms WNL  Eyes:  PERRL; EOMi with anicteric sclera and clear conjunctivae  Mouth:  Oropharynx clear and without exudate; moist mucous membranes; tonsils +3  Neck:   Cervical nodes not enlarged; supple; no bruits  Cardio:  Regular rate and rhythm with normal S1 and S2; no murmurs or rubs  Resp:  CTAB; respirations unlabored; no wheezes, crackles or rhonchi  Abdom: Soft, NTND with normoactive bowel sounds; negative HSM  Extrem: Warm and well-perfused with no clubbing, cyanosis or edema; limited right external rotation and adduction  Skin:  No rashes, lesions, or color changes  Pulses:  2+ and symmetric distally  Neuro:  AAOx3; cooperative and pleasant with no focal deficits    Laboratory:      Most Recent Data:  Lab Results   Component Value Date    WBC 4.02 03/18/2025    HGB 12.0 03/18/2025    HCT 37.5 03/18/2025     03/18/2025    CHOL 240 (H) 06/10/2025    TRIG 82 06/10/2025    HDL 64 06/10/2025    ALT 18 06/10/2025    AST 14 06/10/2025     (L) 06/10/2025    K 3.9 06/10/2025     06/10/2025    BUN 6 06/10/2025    CO2 24 06/10/2025    TSH 2.869 06/10/2025    INR 0.9 12/16/2022    HGBA1C 5.6 06/10/2025              CBC:   WBC   Date Value Ref Range Status   03/18/2025 4.02 3.90 - 12.70 K/uL Final     Hemoglobin   Date Value Ref Range Status   03/18/2025 12.0 12.0 - 16.0 g/dL Final     Hematocrit "   Date Value Ref Range Status   03/18/2025 37.5 37.0 - 48.5 % Final     Platelets   Date Value Ref Range Status   03/18/2025 303 150 - 450 K/uL Final     MCV   Date Value Ref Range Status   03/18/2025 88 82 - 98 fL Final     RDW   Date Value Ref Range Status   03/18/2025 13.4 11.5 - 14.5 % Final     BMP:   Sodium   Date Value Ref Range Status   06/10/2025 133 (L) 136 - 145 mmol/L Final   03/18/2025 137 136 - 145 mmol/L Final     Potassium   Date Value Ref Range Status   06/10/2025 3.9 3.5 - 5.1 mmol/L Final   03/18/2025 3.7 3.5 - 5.1 mmol/L Final     Chloride   Date Value Ref Range Status   06/10/2025 100 95 - 110 mmol/L Final   03/18/2025 102 95 - 110 mmol/L Final     CO2   Date Value Ref Range Status   06/10/2025 24 23 - 29 mmol/L Final   03/18/2025 23 23 - 29 mmol/L Final     BUN   Date Value Ref Range Status   06/10/2025 6 6 - 20 mg/dL Final     Creatinine   Date Value Ref Range Status   06/10/2025 0.7 0.5 - 1.4 mg/dL Final     Glucose   Date Value Ref Range Status   06/10/2025 82 70 - 110 mg/dL Final   03/18/2025 87 70 - 110 mg/dL Final     Calcium   Date Value Ref Range Status   06/10/2025 9.2 8.7 - 10.5 mg/dL Final   03/18/2025 9.0 8.7 - 10.5 mg/dL Final     LFTs:   Protein Total   Date Value Ref Range Status   06/10/2025 8.9 (H) 6.0 - 8.4 gm/dL Final     Total Protein   Date Value Ref Range Status   04/30/2024 8.8 (H) 6.0 - 8.4 g/dL Final     Albumin   Date Value Ref Range Status   06/10/2025 4.0 3.5 - 5.2 g/dL Final   04/30/2024 3.8 3.5 - 5.2 g/dL Final     Total Bilirubin   Date Value Ref Range Status   04/30/2024 0.7 0.1 - 1.0 mg/dL Final     Comment:     For infants and newborns, interpretation of results should be based  on gestational age, weight and in agreement with clinical  observations.    Premature Infant recommended reference ranges:  Up to 24 hours.............<8.0 mg/dL  Up to 48 hours............<12.0 mg/dL  3-5 days..................<15.0 mg/dL  6-29 days.................<15.0 mg/dL        Bilirubin Total   Date Value Ref Range Status   06/10/2025 0.6 0.1 - 1.0 mg/dL Final     Comment:     For infants and newborns, interpretation of results should be based   on gestational age, weight and in agreement with clinical   observations.    Premature Infant recommended reference ranges:   0-24 hours:  <8.0 mg/dL   24-48 hours: <12.0 mg/dL   3-5 days:    <15.0 mg/dL   6-29 days:   <15.0 mg/dL     AST   Date Value Ref Range Status   06/10/2025 14 11 - 45 unit/L Final   04/30/2024 17 10 - 40 U/L Final     Alkaline Phosphatase   Date Value Ref Range Status   04/30/2024 51 (L) 55 - 135 U/L Final     ALP   Date Value Ref Range Status   06/10/2025 50 40 - 150 unit/L Final     ALT   Date Value Ref Range Status   06/10/2025 18 10 - 44 unit/L Final   04/30/2024 17 10 - 44 U/L Final     Coags:   INR   Date Value Ref Range Status   12/16/2022 0.9 0.8 - 1.2 Final     FLP:      Lab Results   Component Value Date    CHOL 240 (H) 06/10/2025    CHOL 218 (H) 04/30/2024    CHOL 222 (H) 08/08/2023     Lab Results   Component Value Date    HDL 64 06/10/2025    HDL 67 04/30/2024    HDL 58 08/08/2023     Lab Results   Component Value Date    LDLCALC 159.6 (H) 06/10/2025    LDLCALC 134.8 04/30/2024    LDLCALC 147.2 08/08/2023     Lab Results   Component Value Date    TRIG 82 06/10/2025    TRIG 81 04/30/2024    TRIG 84 08/08/2023     Lab Results   Component Value Date    CHOLHDL 26.7 06/10/2025    CHOLHDL 30.7 04/30/2024    CHOLHDL 26.1 08/08/2023      DM:      Hemoglobin A1C   Date Value Ref Range Status   06/27/2024 5.3 4.0 - 5.6 % Final     Comment:     ADA Screening Guidelines:  5.7-6.4%  Consistent with prediabetes  >or=6.5%  Consistent with diabetes    High levels of fetal hemoglobin interfere with the HbA1C  assay. Heterozygous hemoglobin variants (HbS, HgC, etc)do  not significantly interfere with this assay.   However, presence of multiple variants may affect accuracy.     04/30/2024 5.9 (H) 4.0 - 5.6 % Final      Comment:     ADA Screening Guidelines:  5.7-6.4%  Consistent with prediabetes  >or=6.5%  Consistent with diabetes    High levels of fetal hemoglobin interfere with the HbA1C  assay. Heterozygous hemoglobin variants (HbS, HgC, etc)do  not significantly interfere with this assay.   However, presence of multiple variants may affect accuracy.     08/08/2023 5.8 (H) 4.0 - 5.6 % Final     Comment:     ADA Screening Guidelines:  5.7-6.4%  Consistent with prediabetes  >or=6.5%  Consistent with diabetes    High levels of fetal hemoglobin interfere with the HbA1C  assay. Heterozygous hemoglobin variants (HbS, HgC, etc)do  not significantly interfere with this assay.   However, presence of multiple variants may affect accuracy.       Hemoglobin A1c   Date Value Ref Range Status   06/10/2025 5.6 4.0 - 5.6 % Final     Comment:     ADA Screening Guidelines:  5.7-6.4%  Consistent with prediabetes  >=6.5%  Consistent with diabetes    High levels of fetal hemoglobin interfere with the HbA1C  assay. Heterozygous hemoglobin variants (HbS, HgC, etc)do  not significantly interfere with this assay.   However, presence of multiple variants may affect accuracy.     LDL Cholesterol   Date Value Ref Range Status   06/10/2025 159.6 (H) 63.0 - 159.0 mg/dL Final     Comment:     The National Cholesterol Education Program (NCEP) has set the  following guidelines (reference values) for LDL Cholesterol:  Optimal.......................<130 mg/dL  Borderline High...............130-159 mg/dL  High..........................160-189 mg/dL  Very High.....................>190 mg/dL  LDL calculated using the Friedewald equation.     Creatinine   Date Value Ref Range Status   06/10/2025 0.7 0.5 - 1.4 mg/dL Final     Thyroid:   TSH   Date Value Ref Range Status   06/10/2025 2.869 0.400 - 4.000 uIU/mL Final   06/27/2024 1.041 0.400 - 4.000 uIU/mL Final     Free T4   Date Value Ref Range Status   06/10/2025 1.10 0.71 - 1.51 ng/dL Final     T3, Total   Date  "Value Ref Range Status   04/30/2024 118 60 - 180 ng/dL Final     Anemia:   Ferritin   Date Value Ref Range Status   06/10/2025 36.0 20.0 - 300.0 ng/mL Final     Cardiac: No results found for: "TROPONINI", "CKTOTAL", "CKMB", "BNP"  Urinalysis:   Color, UA   Date Value Ref Range Status   06/10/2025 Yellow Straw, Maura, Yellow, Light-Orange Final   04/30/2024 Yellow Yellow, Straw, Maura Final     Spec Grav UA   Date Value Ref Range Status   06/10/2025 1.010 1.005 - 1.030 Final     Nitrite, UA   Date Value Ref Range Status   04/30/2024 Negative Negative Final     Nitrites, UA   Date Value Ref Range Status   06/10/2025 Negative Negative Final     Ketones, UA   Date Value Ref Range Status   04/30/2024 Negative Negative Final     Urobilinogen, UA   Date Value Ref Range Status   06/10/2025 Negative <2.0 EU/dL Final       Other Results:  EKG (my interpretation):     Radiology:  X-Ray Chest 1 View Pre-OP  Narrative: EXAMINATION:  XR CHEST 1 VIEW PRE-OP    CLINICAL HISTORY:  Other tear of medial meniscus, current injury, left knee, subsequent encounter    TECHNIQUE:  Single frontal view of the chest was performed.    COMPARISON:  None    FINDINGS:  Cardiomediastinal silhouette within normal limits.  No confluent airspace opacity or lobar consolidation.  No pleural effusion or gross pneumothorax.  No acute osseous abnormality.  Impression: No acute cardiopulmonary abnormality.    Electronically signed by: Soy Roberts  Date:    03/19/2025  Time:    07:30          Assessment/Plan     Serena Yang is a 55 y.o. female with:  1. Preventative health care  -     Comprehensive Metabolic Panel; Future; Expected date: 06/10/2025  -     Hemoglobin A1C; Future; Expected date: 06/10/2025  -     Lipid Panel; Future; Expected date: 06/10/2025  -     Vitamin D; Future; Expected date: 06/10/2025    2. Witnessed episode of apnea  Assessment & Plan:  Refer to Sleep clinic again    Orders:  -     Ambulatory referral/consult to Sleep " Disorders; Future; Expected date: 06/17/2025    3. Vitamin D deficiency  Assessment & Plan:  Check Vit D level on Vit D 50,000 IU  weekly - refill today     Orders:  -     ergocalciferol (ERGOCALCIFEROL) 50,000 unit Cap; Take 1 capsule (50,000 Units total) by mouth every 7 days.  Dispense: 12 capsule; Refill: 3    4. H/O partial thyroidectomy  Overview:  S/p right thyroidectomy 7/18/2023 with dx Benign follicular nodule c/w with follicular adenoma by Dr. Mary    Assessment & Plan:  Check  thyroid labs on Levo 88mcg po daily 30 min prior to breakfast.   Start to regularly exercise 30 min 5 times per week  Exercise 30 min 5 times per week, decrease portion sizes by 50%; encourage plant-based diet;  drink 6-8 glasses of water daily, avoid fast foods, creamy, rich foods jasmin ice cream, cheese creamy salad dressings;avoid eating 3 hours prior to bedtime; consider 8-10 hour intermittent diet; avoid simple sugars jasmin white bread, rice, potatoes, noodles/pasta; No sweetened drinks.       5. Right rotator cuff tendinitis  -     Ambulatory referral/consult to Orthopedics; Future; Expected date: 06/17/2025  -     LIDOcaine (LIDODERM) 5 %; Place 1 patch onto the skin once daily. Remove & Discard patch within 12 hours or as directed by MD  Dispense: 30 patch; Refill: 3    6. Hypothyroidism, unspecified type  -     T4, FREE; Future; Expected date: 06/10/2025  -     TSH; Future; Expected date: 06/10/2025  -     levothyroxine (SYNTHROID) 88 MCG tablet; Take 1 tablet (88 mcg total) by mouth before breakfast.  Dispense: 90 tablet; Refill: 3    7. Iron deficiency anemia due to chronic blood loss  Assessment & Plan:  Check CBC, ferritin     Orders:  -     FERRITIN; Future; Expected date: 06/10/2025    8. Prediabetes  -     Urinalysis; Future; Expected date: 06/10/2025  -     Microalbumin/Creatinine Ratio, Urine; Future; Expected date: 06/10/2025  -     metFORMIN (GLUCOPHAGE) 500 MG tablet; Take 1 tablet (500 mg total) by mouth 2  (two) times daily with meals.  Dispense: 180 tablet; Refill: 3    9. Chronic rhinosinusitis  Overview:  Right mucous retnetion cyst     Assessment & Plan:  Cont ocean nasal spray prior to flonase; singulair 10mg qhs; claritin 10mg daily   F/Anthony Horan    Orders:  -     montelukast (SINGULAIR) 10 mg tablet; Take 1 tablet (10 mg total) by mouth every evening.  Dispense: 90 tablet; Refill: 3  -     fluticasone propionate (FLONASE) 50 mcg/actuation nasal spray; 1 spray (50 mcg total) by Each Nostril route 2 (two) times a day.  Dispense: 48 g; Refill: 3  -     loratadine (CLARITIN) 10 mg tablet; Take 1 tablet (10 mg total) by mouth once daily.  Dispense: 90 tablet; Refill: 3    10. Daytime hypersomnolence  Assessment & Plan:  Order Sleep study      11. Gastroesophageal reflux disease without esophagitis  Assessment & Plan:  Stable on Pepcid 40mg daily   Initiate GERD precautions ie lose weight, avoid eating 3 hours prior to bed, minimize caffeine, alcohol, tobacco, spicy, greasy, fatty foods; avoid peppermint chocolates, citrus and other acidic foods. Increase exercise to help with digestion and avoid lying down immediately after eating.  Elevate head of bed if GERD awakens pt from sleep.  Eat 6 small snacks rather than 3 large meals.      Orders:  -     famotidine (PEPCID) 40 MG tablet; Take 1 tablet (40 mg total) by mouth once daily.  Dispense: 90 tablet; Refill: 3    12. Mass of left parotid gland  Assessment & Plan:  F/u Dr. Tyra paige       13. Class 2 obesity due to excess calories with body mass index (BMI) of 37.0 to 37.9 in adult, unspecified whether serious comorbidity present  Assessment & Plan:  Exercise 30 min 5 times per week - include weight-bearing exercises 20 min at least twice per week, decrease portion sizes by 50%; encourage plant-based diet;  drink 6-8 glasses of water daily, avoid fast foods, creamy, rich foods jasmin ice cream, cheese creamy salad dressings;avoid eating 3 hours prior to bedtime;  consider 8-10 hour intermittent diet; avoid simple sugars jasmin white bread, rice, potatoes, noodles/pasta; no sweetened drinks.    Remember: 1 lb = 3500 calories     Reduce alcohol to max 1-2 drinks per day (1 drink = 12 beer;  1 oz hard liquor; 5 oz wine)    Limit eating at restaurants to once per week; avoid fast foods, junk foods, impulsive eating. Drink 1 glass of lukewarm water before eating. Chew 100 times before swallowing food.     Use the Lose It [employer]orio to track calories and aim for eating less than 1200 calories per day.        14. Slow transit constipation  Assessment & Plan:  Fiber gummies 2-3 per day   For Acute Constipation:  -Take Fiber gummies 2 daily   -Start Mag citrate 8oz bottle with Sennakot plus up to twice per day for severe constipation  Miralax 17gm bid in 8oz prune juice    Maintenance:   --hydrate well: drink 8-10 glasses of water/day  Start daily fiber (25 gm per day ideally).  Take fiber gummies 2 per day  -Miralax 17gm in 8oz liquid daily  - Take 1 tsp of fiber powder (psyllium or other sugar-free powder).  Mix in 8 oz of water.  Take x 3-5 days.  Then, increase fiber by 1 tsp every 3-5 days until stool is easy to pass.  Stop and continue at that dose.   Do not exceed 6 tsps/day.  May also use over the counter stool softener 1-2 x/day      AVOID laxatives.  --controlling constipation can keep hemorrhoids from flaring    Foods high in fiber:  Fruit: jasmin raspberries, pear, apple with skin  Veggies: jasmin boiled green peas, broccoli, brussel sprouts, turnip greens, baked potatoes with skin  Grains: whole wheat spaghetti, cooked pearled barley, bran flakes, cooked quinoa, instant oatmeal, oat bran muffin  Nuts/Legumes: boiled split peas, boiled lentils, boiled black beans, backed canned beans, jan seeds    Controlling constipation may help bladder urgency/leakage and fiber may better control cholesterol and blood glucose.        15. Adjustment insomnia  Assessment & Plan:  Mag glycinate  400-600mg qhs   Trial of unisom       16. Neuropathy  Overview:  Post-op complication after right thyroidectomy on 7/18/2023  Right arm/hand     Assessment & Plan:  Cont gabapentin 300mg bid     Orders:  -     gabapentin (NEURONTIN) 300 MG capsule; Take 1 capsule (300 mg total) by mouth 2 (two) times daily.  Dispense: 180 capsule; Refill: 3    17. Fibroids  Assessment & Plan:  Stable at this time               I spent a total of 405 minutes on the day of the visit.This includes face to face time and non-face to face time preparing to see the patient (eg, review of tests), obtaining and/or reviewing separately obtained history, documenting clinical information in the electronic or other health record, independently interpreting results and communicating results to the patient/family/caregiver, or care coordinator.   Code Status:     Janelle Zamora MD

## 2025-06-10 NOTE — PATIENT INSTRUCTIONS
Preventative health care  -     Comprehensive Metabolic Panel; Future; Expected date: 06/10/2025  -     Hemoglobin A1C; Future; Expected date: 06/10/2025  -     Lipid Panel; Future; Expected date: 06/10/2025  -     Vitamin D; Future; Expected date: 06/10/2025    Witnessed episode of apnea  Assessment & Plan:  Refer to Sleep clinic again    Orders:  -     Ambulatory referral/consult to Sleep Disorders; Future; Expected date: 06/17/2025    Vitamin D deficiency  Assessment & Plan:  Check Vit D level on Vit D 50,000 IU 3 times per month weekly - refill today     Orders:  -     ergocalciferol (ERGOCALCIFEROL) 50,000 unit Cap; Take 1 capsule (50,000 Units total) by mouth every 7 days.  Dispense: 12 capsule; Refill: 3    H/O partial thyroidectomy  Assessment & Plan:  Check  thyroid labs on Levo 50mcg po daily 30 min prior to breakfast.   Start to regularly exercise 30 min 5 times per week  Exercise 30 min 5 times per week, decrease portion sizes by 50%; encourage plant-based diet;  drink 6-8 glasses of water daily, avoid fast foods, creamy, rich foods jasmin ice cream, cheese creamy salad dressings;avoid eating 3 hours prior to bedtime; consider 8-10 hour intermittent diet; avoid simple sugars jasmin white bread, rice, potatoes, noodles/pasta; No sweetened drinks.       Right rotator cuff tendinitis  -     Ambulatory referral/consult to Orthopedics; Future; Expected date: 06/17/2025  -     LIDOcaine (LIDODERM) 5 %; Place 1 patch onto the skin once daily. Remove & Discard patch within 12 hours or as directed by MD  Dispense: 30 patch; Refill: 3    Hypothyroidism, unspecified type  -     T4, FREE; Future; Expected date: 06/10/2025  -     TSH; Future; Expected date: 06/10/2025    Iron deficiency anemia due to chronic blood loss  -     FERRITIN; Future; Expected date: 06/10/2025    Prediabetes  -     Urinalysis; Future; Expected date: 06/10/2025  -     Microalbumin/Creatinine Ratio, Urine; Future; Expected date: 06/10/2025  -      metFORMIN (GLUCOPHAGE) 500 MG tablet; Take 1 tablet (500 mg total) by mouth 2 (two) times daily with meals.  Dispense: 180 tablet; Refill: 3    Chronic rhinosinusitis  Assessment & Plan:  Cont ocean nasal spray prior to flonase; singulair 10mg qhs; claritin 10mg daily   F/Anthony Horan    Orders:  -     montelukast (SINGULAIR) 10 mg tablet; Take 1 tablet (10 mg total) by mouth every evening.  Dispense: 90 tablet; Refill: 3  -     fluticasone propionate (FLONASE) 50 mcg/actuation nasal spray; 1 spray (50 mcg total) by Each Nostril route 2 (two) times a day.  Dispense: 48 g; Refill: 3  -     loratadine (CLARITIN) 10 mg tablet; Take 1 tablet (10 mg total) by mouth once daily.  Dispense: 90 tablet; Refill: 3    Daytime hypersomnolence  Assessment & Plan:  Order Sleep study      Gastroesophageal reflux disease without esophagitis  Assessment & Plan:  Start Pepcid 40mg daily   Initiate GERD precautions ie lose weight, avoid eating 3 hours prior to bed, minimize caffeine, alcohol, tobacco, spicy, greasy, fatty foods; avoid peppermint chocolates, citrus and other acidic foods. Increase exercise to help with digestion and avoid lying down immediately after eating.  Elevate head of bed if GERD awakens pt from sleep.  Eat 6 small snacks rather than 3 large meals.      Orders:  -     famotidine (PEPCID) 40 MG tablet; Take 1 tablet (40 mg total) by mouth once daily.  Dispense: 90 tablet; Refill: 3    Mass of left parotid gland  Assessment & Plan:  F/u Dr. Tyra paige       Class 2 obesity due to excess calories with body mass index (BMI) of 37.0 to 37.9 in adult, unspecified whether serious comorbidity present  Assessment & Plan:  Exercise 30 min 5 times per week - include weight-bearing exercises 20 min at least twice per week, decrease portion sizes by 50%; encourage plant-based diet;  drink 6-8 glasses of water daily, avoid fast foods, creamy, rich foods jasmin ice cream, cheese creamy salad dressings;avoid eating 3 hours prior  to bedtime; consider 8-10 hour intermittent diet; avoid simple sugars jasmin white bread, rice, potatoes, noodles/pasta; no sweetened drinks.    Remember: 1 lb = 3500 calories     Reduce alcohol to max 1-2 drinks per day (1 drink = 12 beer;  1 oz hard liquor; 5 oz wine)    Limit eating at restaurants to once per week; avoid fast foods, junk foods, impulsive eating. Drink 1 glass of lukewarm water before eating. Chew 100 times before swallowing food.     Use the Lose It [employer] to track calories and aim for eating less than 1200 calories per day.        Slow transit constipation  Assessment & Plan:  Fiber gummies 2-3 per day   For Acute Constipation:  -Take Fiber gummies 2 daily   -Start Mag citrate 8oz bottle with Sennakot plus up to twice per day for severe constipation  Miralax 17gm bid in 8oz prune juice    Maintenance:   --hydrate well: drink 8-10 glasses of water/day  Start daily fiber (25 gm per day ideally).  Take fiber gummies 2 per day  -Miralax 17gm in 8oz liquid daily  - Take 1 tsp of fiber powder (psyllium or other sugar-free powder).  Mix in 8 oz of water.  Take x 3-5 days.  Then, increase fiber by 1 tsp every 3-5 days until stool is easy to pass.  Stop and continue at that dose.   Do not exceed 6 tsps/day.  May also use over the counter stool softener 1-2 x/day      AVOID laxatives.  --controlling constipation can keep hemorrhoids from flaring    Foods high in fiber:  Fruit: jasmin raspberries, pear, apple with skin  Veggies: jasmin boiled green peas, broccoli, brussel sprouts, turnip greens, baked potatoes with skin  Grains: whole wheat spaghetti, cooked pearled barley, bran flakes, cooked quinoa, instant oatmeal, oat bran muffin  Nuts/Legumes: boiled split peas, boiled lentils, boiled black beans, backed canned beans, jan seeds    Controlling constipation may help bladder urgency/leakage and fiber may better control cholesterol and blood glucose.        Adjustment insomnia  Assessment & Plan:  Mag glycinate  400-600mg qhs   Trial of unisom       Neuropathy  Assessment & Plan:  Cont gabapentin 300mg bid     Orders:  -     gabapentin (NEURONTIN) 300 MG capsule; Take 1 capsule (300 mg total) by mouth 2 (two) times daily.  Dispense: 180 capsule; Refill: 3    Inflammatory neuropathy    Patellofemoral syndrome, bilateral

## 2025-06-10 NOTE — ASSESSMENT & PLAN NOTE
Check  thyroid labs on Levo 88mcg po daily 30 min prior to breakfast.   Start to regularly exercise 30 min 5 times per week  Exercise 30 min 5 times per week, decrease portion sizes by 50%; encourage plant-based diet;  drink 6-8 glasses of water daily, avoid fast foods, creamy, rich foods ajsmin ice cream, cheese creamy salad dressings;avoid eating 3 hours prior to bedtime; consider 8-10 hour intermittent diet; avoid simple sugars jasmin white bread, rice, potatoes, noodles/pasta; No sweetened drinks.

## 2025-06-10 NOTE — ASSESSMENT & PLAN NOTE
Exercise 30 min 5 times per week - include weight-bearing exercises 20 min at least twice per week, decrease portion sizes by 50%; encourage plant-based diet;  drink 6-8 glasses of water daily, avoid fast foods, creamy, rich foods jasmin ice cream, cheese creamy salad dressings;avoid eating 3 hours prior to bedtime; consider 8-10 hour intermittent diet; avoid simple sugars jasmin white bread, rice, potatoes, noodles/pasta; no sweetened drinks.    Remember: 1 lb = 3500 calories     Reduce alcohol to max 1-2 drinks per day (1 drink = 12 beer;  1 oz hard liquor; 5 oz wine)    Limit eating at restaurants to once per week; avoid fast foods, junk foods, impulsive eating. Drink 1 glass of lukewarm water before eating. Chew 100 times before swallowing food.     Use the Lose It [employer] to track calories and aim for eating less than 1200 calories per day.

## 2025-06-17 DIAGNOSIS — M25.511 RIGHT SHOULDER PAIN, UNSPECIFIED CHRONICITY: Primary | ICD-10-CM

## 2025-06-23 ENCOUNTER — PATIENT MESSAGE (OUTPATIENT)
Dept: PRIMARY CARE CLINIC | Facility: CLINIC | Age: 55
End: 2025-06-23
Payer: COMMERCIAL

## 2025-06-23 DIAGNOSIS — N94.6 DYSMENORRHEA: ICD-10-CM

## 2025-06-24 RX ORDER — CYCLOBENZAPRINE HCL 10 MG
5 TABLET ORAL NIGHTLY
Qty: 90 TABLET | Refills: 3 | Status: SHIPPED | OUTPATIENT
Start: 2025-06-24

## 2025-06-24 RX ORDER — NAPROXEN 500 MG/1
500 TABLET ORAL 2 TIMES DAILY WITH MEALS
Qty: 60 TABLET | Refills: 5 | Status: SHIPPED | OUTPATIENT
Start: 2025-06-24

## 2025-06-24 NOTE — TELEPHONE ENCOUNTER
No care due was identified.  [facility] Embedded Care Due Messages. Reference number: [id number].   6/24/2025 10:04:35 AM CDT

## 2025-08-14 ENCOUNTER — HOSPITAL ENCOUNTER (OUTPATIENT)
Dept: RADIOLOGY | Facility: OTHER | Age: 55
Discharge: HOME | End: 2025-08-14
Attending: INTERNAL MEDICINE
Payer: COMMERCIAL

## 2025-08-14 DIAGNOSIS — R92.8 ABNORMAL MAMMOGRAM OF RIGHT BREAST: ICD-10-CM

## 2025-08-14 PROCEDURE — 77061 BREAST TOMOSYNTHESIS UNI: CPT | Mod: 26,RT,, | Performed by: RADIOLOGY

## 2025-08-14 PROCEDURE — 77065 DX MAMMO INCL CAD UNI: CPT | Mod: 26,RT,, | Performed by: RADIOLOGY

## 2025-08-14 PROCEDURE — 77061 BREAST TOMOSYNTHESIS UNI: CPT | Mod: TC,RT

## 2025-09-02 ENCOUNTER — OFFICE VISIT (OUTPATIENT)
Dept: SLEEP MEDICINE | Facility: CLINIC | Age: 55
End: 2025-09-02
Payer: COMMERCIAL

## 2025-09-02 DIAGNOSIS — R06.81 WITNESSED EPISODE OF APNEA: ICD-10-CM

## 2025-09-02 DIAGNOSIS — R06.83 SNORING: Primary | ICD-10-CM

## 2025-09-02 DIAGNOSIS — G47.00 INSOMNIA, UNSPECIFIED TYPE: ICD-10-CM

## 2025-09-02 RX ORDER — DIAZEPAM 5 MG/1
5 TABLET ORAL EVERY 6 HOURS PRN
Qty: 1 TABLET | Refills: 0 | Status: SHIPPED | OUTPATIENT
Start: 2025-09-02

## (undated) DEVICE — SOCKINETTE IMPERVIOUS 12X48IN

## (undated) DEVICE — SYR 30CC LUER LOCK

## (undated) DEVICE — COVER PROXIMA MAYO STAND

## (undated) DEVICE — PAD ABDOMINAL STERILE 5X9IN

## (undated) DEVICE — TUBING 4-LEAD ARTHOSCOPY

## (undated) DEVICE — ADHESIVE DERMABOND ADVANCED

## (undated) DEVICE — DURAPREP SURG SCRUB 26ML

## (undated) DEVICE — Device

## (undated) DEVICE — SPONGE COTTON TRAY 4X4IN

## (undated) DEVICE — PACK EXTREMITY KENNER

## (undated) DEVICE — BNDG COFLEX FOAM LF2 ST 6X5YD

## (undated) DEVICE — NDL HYPO STD REG BVL 18GX1.5IN

## (undated) DEVICE — DRAPE U SPLIT SHEET 54X76IN

## (undated) DEVICE — GLOVE SENSICARE PI SURG 7.5

## (undated) DEVICE — CLOSURE SKIN STERI STRIP 1/2X4

## (undated) DEVICE — BANDAGE MATRIX HK LOOP 6IN 5YD

## (undated) DEVICE — MANIFOLD 4 PORT

## (undated) DEVICE — NDL SPINAL 18GX3.5 SPINOCAN

## (undated) DEVICE — TOWEL OR XRAY BLUE 17X26IN

## (undated) DEVICE — TUBING SUC UNIV W/CONN 12FT

## (undated) DEVICE — ALCOHOL 70% ISOP W/GREEN 16OZ

## (undated) DEVICE — BLADE SURG CARBON STEEL SZ11

## (undated) DEVICE — COVER OVERHEAD SURG LT BLUE

## (undated) DEVICE — GLOVE SENSICARE PI GRN 8